# Patient Record
Sex: MALE | Race: BLACK OR AFRICAN AMERICAN | NOT HISPANIC OR LATINO | URBAN - METROPOLITAN AREA
[De-identification: names, ages, dates, MRNs, and addresses within clinical notes are randomized per-mention and may not be internally consistent; named-entity substitution may affect disease eponyms.]

---

## 2018-07-19 ENCOUNTER — EMERGENCY (EMERGENCY)
Facility: HOSPITAL | Age: 48
LOS: 0 days | Discharge: HOME | End: 2018-07-19
Attending: EMERGENCY MEDICINE
Payer: MEDICAID

## 2018-07-19 VITALS
WEIGHT: 189.6 LBS | HEART RATE: 72 BPM | RESPIRATION RATE: 17 BRPM | TEMPERATURE: 97 F | HEIGHT: 69 IN | OXYGEN SATURATION: 100 % | SYSTOLIC BLOOD PRESSURE: 100 MMHG | DIASTOLIC BLOOD PRESSURE: 63 MMHG

## 2018-07-19 DIAGNOSIS — K59.00 CONSTIPATION, UNSPECIFIED: ICD-10-CM

## 2018-07-19 DIAGNOSIS — K56.41 FECAL IMPACTION: ICD-10-CM

## 2018-07-19 DIAGNOSIS — N18.6 END STAGE RENAL DISEASE: ICD-10-CM

## 2018-07-19 DIAGNOSIS — K62.89 OTHER SPECIFIED DISEASES OF ANUS AND RECTUM: ICD-10-CM

## 2018-07-19 DIAGNOSIS — Z99.2 DEPENDENCE ON RENAL DIALYSIS: ICD-10-CM

## 2018-07-19 PROCEDURE — 99284 EMERGENCY DEPT VISIT MOD MDM: CPT | Mod: 25

## 2018-07-19 RX ORDER — SENNOSIDES/DOCUSATE SODIUM 8.6MG-50MG
2 TABLET ORAL
Qty: 20 | Refills: 0 | OUTPATIENT
Start: 2018-07-19 | End: 2018-07-28

## 2018-07-19 RX ORDER — SENNOSIDES/DOCUSATE SODIUM 8.6MG-50MG
2 TABLET ORAL
Qty: 20 | Refills: 0
Start: 2018-07-19 | End: 2018-07-28

## 2018-07-19 NOTE — ED ADULT NURSE NOTE - OBJECTIVE STATEMENT
Pt presents to the ED awake alert and oriented x 3. C/O rectal pain , pt states he feels like he is constipated. his last BM was yesterday. will continue to monitor

## 2018-07-19 NOTE — ED PROVIDER NOTE - MEDICAL DECISION MAKING DETAILS
Patient with fecal impaction, relieved in the ER.  VSS, felt much improved.  Further BM achieved with enema.  Patient will be discharged with laxative meds.  Based on patient's history and physical exam, there are no apparent indications for further emergent labs, imaging, or additional diagnostics at this time.  Discussed results and outcome of today's visit with the patient.  Patient advised to please follow up with another healthcare provider within the next 24 hours and return to the Emergency Department for worsening symptoms or any other concerns.  Patient advised that their doctor may call  to follow up on the specific results of the tests performed today in the emergency department.   Patient appears well on discharge.

## 2018-07-19 NOTE — ED PROVIDER NOTE - PROGRESS NOTE DETAILS
Digital disimpaction done in ER with immediate relief, still has hard stool far up rectum which is hard to reach, ordered enema, no masses in rectum.

## 2018-07-19 NOTE — ED PROVIDER NOTE - OBJECTIVE STATEMENT
Pertinent PMH/PSH/FHx/SHx and Review of Systems contained within:  Patient presents to the ED for rectal pain.  Patient says that he was having constipation, straining to have a BM but was unable to go.  Now having intense rectal pain.  Denies any abdominal pain or vomiting.  Denies any blood in stool.  LBM was yesterday, was a little hard.  Denies inserting any objects per rectum.     Relevant PMHx/SHx/SOCHx/FAMH:  ESRD on TuThSa  Patient denies EtOH/tobacco/illicit substance use.    ROS: No fever/chills, No headache/photophobia/eye pain/changes in vision, No ear pain/sore throat/dysphagia, No chest pain/palpitations, no SOB/cough/wheeze/stridor, No abdominal pain, No N/V/D/melena, no dysuria/frequency/discharge, No neck/back pain, no rash, no changes in neurological status/function.

## 2018-07-19 NOTE — ED ADULT TRIAGE NOTE - CHIEF COMPLAINT QUOTE
rectal pain feels constipated , last BM yesterday rectal pain feels constipated , last BM yesterday, dialysis  pt

## 2018-07-19 NOTE — ED PROVIDER NOTE - PHYSICAL EXAMINATION
Gen: Alert, moderate distress, well appearing  Head: NC, AT, PERRL, EOMI, normal lids/conjunctiva  ENT: normal hearing, patent oropharynx without erythema/exudate, uvula midline  Neck: +supple, no tenderness/meningismus/JVD, +Trachea midline  Pulm: Bilateral BS, normal resp effort, no wheeze/stridor/retractions  CV: RRR, no M/R/G, +dist pulses  Abd: soft, NT/ND, +BS, no palpable masses  Rectal: No hemorrhoids, rectum is distended due to fecal impaction  Mskel: no edema/erythema/cyanosis  Skin: no rash, warm/dry  Neuro: AAOx3, no apparent sensory/motor deficits, coordination intact

## 2019-03-07 ENCOUNTER — EMERGENCY (EMERGENCY)
Facility: HOSPITAL | Age: 49
LOS: 0 days | Discharge: HOME | End: 2019-03-07
Attending: EMERGENCY MEDICINE
Payer: MEDICAID

## 2019-03-07 VITALS
HEART RATE: 90 BPM | SYSTOLIC BLOOD PRESSURE: 146 MMHG | RESPIRATION RATE: 18 BRPM | OXYGEN SATURATION: 95 % | TEMPERATURE: 98 F | DIASTOLIC BLOOD PRESSURE: 814 MMHG

## 2019-03-07 VITALS
RESPIRATION RATE: 20 BRPM | HEIGHT: 70 IN | SYSTOLIC BLOOD PRESSURE: 164 MMHG | OXYGEN SATURATION: 99 % | WEIGHT: 205.03 LBS | HEART RATE: 90 BPM | TEMPERATURE: 98 F | DIASTOLIC BLOOD PRESSURE: 80 MMHG

## 2019-03-07 DIAGNOSIS — Z99.2 DEPENDENCE ON RENAL DIALYSIS: ICD-10-CM

## 2019-03-07 DIAGNOSIS — M79.89 OTHER SPECIFIED SOFT TISSUE DISORDERS: ICD-10-CM

## 2019-03-07 DIAGNOSIS — N18.6 END STAGE RENAL DISEASE: ICD-10-CM

## 2019-03-07 LAB
ALBUMIN SERPL ELPH-MCNC: 2.9 G/DL — LOW (ref 3.3–5)
ALP SERPL-CCNC: 68 U/L — SIGNIFICANT CHANGE UP (ref 40–120)
ALT FLD-CCNC: 23 U/L — SIGNIFICANT CHANGE UP (ref 12–78)
ANION GAP SERPL CALC-SCNC: 12 MMOL/L — SIGNIFICANT CHANGE UP (ref 5–17)
APTT BLD: 31.7 SEC — SIGNIFICANT CHANGE UP (ref 27.5–36.3)
AST SERPL-CCNC: 23 U/L — SIGNIFICANT CHANGE UP (ref 15–37)
BILIRUB SERPL-MCNC: 0.5 MG/DL — SIGNIFICANT CHANGE UP (ref 0.2–1.2)
BUN SERPL-MCNC: 78 MG/DL — HIGH (ref 7–23)
CALCIUM SERPL-MCNC: 6.5 MG/DL — CRITICAL LOW (ref 8.5–10.1)
CHLORIDE SERPL-SCNC: 107 MMOL/L — SIGNIFICANT CHANGE UP (ref 96–108)
CO2 SERPL-SCNC: 23 MMOL/L — SIGNIFICANT CHANGE UP (ref 22–31)
CREAT SERPL-MCNC: 19.8 MG/DL — HIGH (ref 0.5–1.3)
GLUCOSE SERPL-MCNC: 54 MG/DL — LOW (ref 70–99)
HCT VFR BLD CALC: 23.2 % — LOW (ref 39–50)
HGB BLD-MCNC: 7.7 G/DL — LOW (ref 13–17)
INR BLD: 1.14 RATIO — SIGNIFICANT CHANGE UP (ref 0.88–1.16)
MCHC RBC-ENTMCNC: 29.3 PG — SIGNIFICANT CHANGE UP (ref 27–34)
MCHC RBC-ENTMCNC: 33.2 GM/DL — SIGNIFICANT CHANGE UP (ref 32–36)
MCV RBC AUTO: 88.2 FL — SIGNIFICANT CHANGE UP (ref 80–100)
NRBC # BLD: 0 /100 WBCS — SIGNIFICANT CHANGE UP (ref 0–0)
PLATELET # BLD AUTO: 214 K/UL — SIGNIFICANT CHANGE UP (ref 150–400)
POTASSIUM SERPL-MCNC: 4.8 MMOL/L — SIGNIFICANT CHANGE UP (ref 3.5–5.3)
POTASSIUM SERPL-SCNC: 4.8 MMOL/L — SIGNIFICANT CHANGE UP (ref 3.5–5.3)
PROT SERPL-MCNC: 8 GM/DL — SIGNIFICANT CHANGE UP (ref 6–8.3)
PROTHROM AB SERPL-ACNC: 12.8 SEC — SIGNIFICANT CHANGE UP (ref 10–12.9)
RBC # BLD: 2.63 M/UL — LOW (ref 4.2–5.8)
RBC # FLD: 14.2 % — SIGNIFICANT CHANGE UP (ref 10.3–14.5)
SODIUM SERPL-SCNC: 142 MMOL/L — SIGNIFICANT CHANGE UP (ref 135–145)
WBC # BLD: 7.43 K/UL — SIGNIFICANT CHANGE UP (ref 3.8–10.5)
WBC # FLD AUTO: 7.43 K/UL — SIGNIFICANT CHANGE UP (ref 3.8–10.5)

## 2019-03-07 PROCEDURE — 93970 EXTREMITY STUDY: CPT | Mod: 26

## 2019-03-07 PROCEDURE — 71045 X-RAY EXAM CHEST 1 VIEW: CPT | Mod: 26

## 2019-03-07 PROCEDURE — 99284 EMERGENCY DEPT VISIT MOD MDM: CPT

## 2019-03-07 RX ORDER — CALCIUM GLUCONATE 100 MG/ML
1 VIAL (ML) INTRAVENOUS ONCE
Qty: 0 | Refills: 0 | Status: COMPLETED | OUTPATIENT
Start: 2019-03-07 | End: 2019-03-07

## 2019-03-07 RX ORDER — ACETAMINOPHEN 500 MG
975 TABLET ORAL ONCE
Qty: 0 | Refills: 0 | Status: COMPLETED | OUTPATIENT
Start: 2019-03-07 | End: 2019-03-07

## 2019-03-07 RX ADMIN — Medication 1 GRAM(S): at 18:27

## 2019-03-07 RX ADMIN — Medication 200 GRAM(S): at 17:36

## 2019-03-07 RX ADMIN — Medication 975 MILLIGRAM(S): at 18:57

## 2019-03-07 RX ADMIN — Medication 975 MILLIGRAM(S): at 15:15

## 2019-03-07 NOTE — ED PROVIDER NOTE - PHYSICAL EXAMINATION
Vitals: HTN at 164/80, otherwise WNL  Gen: AAOx3, NAD, sitting comfortably in stretcher  Head: ncat, perrla, eomi b/l  Neck: supple, no lymphadenopathy, no midline deviation  Heart: rrr, no m/r/g  Lungs: CTA b/l, no rales/ronchi/wheezes  Abd: soft, nontender, non-distended, no rebound or guarding  Ext: no clubbing/cyanosis, b/l LE 2+ edema, no signs of cellulitis or infection, tender to palpation over b/l calves  Neuro: sensation and muscle strength intact b/l, steady gait

## 2019-03-07 NOTE — ED PROVIDER NOTE - CLINICAL SUMMARY MEDICAL DECISION MAKING FREE TEXT BOX
49 yo M with LE edema, L foot pain atraumatic  -r/o DVT, US b/l LE, basic labs, mag, coags, cxray chest (r/o edema), tylenol prn, monitor, iv line  -f/u results, reeval

## 2019-03-07 NOTE — ED ADULT NURSE NOTE - NSIMPLEMENTINTERV_GEN_ALL_ED
Implemented All Fall Risk Interventions:  [place] to call system. Call bell, personal items and telephone within reach. Instruct patient to call for assistance. Room bathroom lighting operational. Non-slip footwear when patient is off stretcher. Physically safe environment: no spills, clutter or unnecessary equipment. Stretcher in lowest position, wheels locked, appropriate side rails in place. Provide visual cue, wrist band, yellow gown, etc. Monitor gait and stability. Monitor for mental status changes and reorient to person, place, and time. Review medications for side effects contributing to fall risk. Reinforce activity limits and safety measures with patient and family.

## 2019-03-07 NOTE — ED PROVIDER NOTE - CARE PROVIDER_API CALL
Fabrizio Bender)  Internal Medicine; Nephrology  [address] [facility], [address] [place] [id number]  Phone: [phone]  Fax: [phone]  Follow Up Time: 4-6 Days

## 2019-03-07 NOTE — ED ADULT NURSE NOTE - OBJECTIVE STATEMENT
received er bed 24 biba c/o l lower extremity noted with swelling x 2 days serous fluid leaking at site pain upon weight bearing pt with esrd on dialysis last treatment on tues was due today but states difficulty ambulating due to foot pain so called ambulance to come to er instead lungs clear b/l denies shortness of breath or chest pain l arm av fistula + bruit/thrill

## 2019-03-07 NOTE — ED PROVIDER NOTE - OBJECTIVE STATEMENT
47 yo M with b/l LE swelling, worse over the last few days, worse on L.  Pt. reports acute severe L foot pain also that stopped him from going to dialysis today.  Pt. denies acute inciting event/trauma.  He feels well otherwise.   ROS: negative for fever, cough, headache, chest pain, shortness of breath, abd pain, nausea, vomiting, diarrhea, rash, paresthesia, and weakness--all other systems reviewed are negative.   PMH: ESRD, dialysis dependent (T, [facility], [facility] in [place]); Meds: See EMR; SH: Denies smoking/drinking/drug use

## 2019-03-07 NOTE — ED ADULT TRIAGE NOTE - CHIEF COMPLAINT QUOTE
biba . " pt c/o left leg swelling and difficulty walking , "pt missed dialysis today was last dialyzed on Tuesday

## 2019-03-08 ENCOUNTER — EMERGENCY (EMERGENCY)
Facility: HOSPITAL | Age: 49
LOS: 1 days | Discharge: HOME | End: 2019-03-08
Attending: EMERGENCY MEDICINE | Admitting: EMERGENCY MEDICINE
Payer: COMMERCIAL

## 2019-03-08 VITALS
WEIGHT: 201.94 LBS | TEMPERATURE: 99 F | RESPIRATION RATE: 18 BRPM | DIASTOLIC BLOOD PRESSURE: 89 MMHG | OXYGEN SATURATION: 97 % | HEART RATE: 95 BPM | SYSTOLIC BLOOD PRESSURE: 161 MMHG

## 2019-03-08 DIAGNOSIS — I87.2 VENOUS INSUFFICIENCY (CHRONIC) (PERIPHERAL): ICD-10-CM

## 2019-03-08 DIAGNOSIS — I83.023 VARICOSE VEINS OF LEFT LOWER EXTREMITY WITH ULCER OF ANKLE: ICD-10-CM

## 2019-03-08 DIAGNOSIS — M79.89 OTHER SPECIFIED SOFT TISSUE DISORDERS: ICD-10-CM

## 2019-03-08 DIAGNOSIS — Z79.899 OTHER LONG TERM (CURRENT) DRUG THERAPY: ICD-10-CM

## 2019-03-08 LAB
ALBUMIN SERPL ELPH-MCNC: 3.6 G/DL — SIGNIFICANT CHANGE UP (ref 3.3–5)
ALP SERPL-CCNC: 62 U/L — SIGNIFICANT CHANGE UP (ref 40–120)
ALT FLD-CCNC: 15 U/L — SIGNIFICANT CHANGE UP (ref 10–45)
ANION GAP SERPL CALC-SCNC: 18 MMOL/L — HIGH (ref 5–17)
APPEARANCE UR: CLEAR — SIGNIFICANT CHANGE UP
APTT BLD: 30.5 SEC — SIGNIFICANT CHANGE UP (ref 27.5–36.3)
AST SERPL-CCNC: 17 U/L — SIGNIFICANT CHANGE UP (ref 10–40)
BACTERIA # UR AUTO: PRESENT /HPF
BASOPHILS # BLD AUTO: 0.02 K/UL — SIGNIFICANT CHANGE UP (ref 0–0.2)
BASOPHILS NFR BLD AUTO: 0.3 % — SIGNIFICANT CHANGE UP (ref 0–2)
BILIRUB SERPL-MCNC: 0.3 MG/DL — SIGNIFICANT CHANGE UP (ref 0.2–1.2)
BILIRUB UR-MCNC: NEGATIVE — SIGNIFICANT CHANGE UP
BUN SERPL-MCNC: 75 MG/DL — HIGH (ref 7–23)
CALCIUM SERPL-MCNC: 7.4 MG/DL — LOW (ref 8.4–10.5)
CHLORIDE SERPL-SCNC: 102 MMOL/L — SIGNIFICANT CHANGE UP (ref 96–108)
CO2 SERPL-SCNC: 20 MMOL/L — LOW (ref 22–31)
COLOR SPEC: YELLOW — SIGNIFICANT CHANGE UP
CREAT SERPL-MCNC: 20.09 MG/DL — HIGH (ref 0.5–1.3)
DIFF PNL FLD: ABNORMAL
EOSINOPHIL # BLD AUTO: 1.5 K/UL — HIGH (ref 0–0.5)
EOSINOPHIL NFR BLD AUTO: 20.1 % — HIGH (ref 0–6)
EPI CELLS # UR: SIGNIFICANT CHANGE UP /HPF (ref 0–5)
GLUCOSE SERPL-MCNC: 99 MG/DL — SIGNIFICANT CHANGE UP (ref 70–99)
GLUCOSE UR QL: NEGATIVE — SIGNIFICANT CHANGE UP
HCT VFR BLD CALC: 24 % — LOW (ref 39–50)
HGB BLD-MCNC: 8 G/DL — LOW (ref 13–17)
IMM GRANULOCYTES NFR BLD AUTO: 0.7 % — SIGNIFICANT CHANGE UP (ref 0–1.5)
INR BLD: 1.15 — SIGNIFICANT CHANGE UP (ref 0.88–1.16)
KETONES UR-MCNC: NEGATIVE — SIGNIFICANT CHANGE UP
LEUKOCYTE ESTERASE UR-ACNC: NEGATIVE — SIGNIFICANT CHANGE UP
LYMPHOCYTES # BLD AUTO: 0.53 K/UL — LOW (ref 1–3.3)
LYMPHOCYTES # BLD AUTO: 7.1 % — LOW (ref 13–44)
MCHC RBC-ENTMCNC: 30.4 PG — SIGNIFICANT CHANGE UP (ref 27–34)
MCHC RBC-ENTMCNC: 33.3 GM/DL — SIGNIFICANT CHANGE UP (ref 32–36)
MCV RBC AUTO: 91.3 FL — SIGNIFICANT CHANGE UP (ref 80–100)
MONOCYTES # BLD AUTO: 0.61 K/UL — SIGNIFICANT CHANGE UP (ref 0–0.9)
MONOCYTES NFR BLD AUTO: 8.2 % — SIGNIFICANT CHANGE UP (ref 2–14)
NEUTROPHILS # BLD AUTO: 4.76 K/UL — SIGNIFICANT CHANGE UP (ref 1.8–7.4)
NEUTROPHILS NFR BLD AUTO: 63.6 % — SIGNIFICANT CHANGE UP (ref 43–77)
NITRITE UR-MCNC: NEGATIVE — SIGNIFICANT CHANGE UP
NRBC # BLD: 0 /100 WBCS — SIGNIFICANT CHANGE UP (ref 0–0)
PH UR: 6 — SIGNIFICANT CHANGE UP (ref 5–8)
PLATELET # BLD AUTO: 239 K/UL — SIGNIFICANT CHANGE UP (ref 150–400)
POTASSIUM SERPL-MCNC: 4.5 MMOL/L — SIGNIFICANT CHANGE UP (ref 3.5–5.3)
POTASSIUM SERPL-SCNC: 4.5 MMOL/L — SIGNIFICANT CHANGE UP (ref 3.5–5.3)
PROT SERPL-MCNC: 8.5 G/DL — HIGH (ref 6–8.3)
PROT UR-MCNC: 100 MG/DL
PROTHROM AB SERPL-ACNC: 13.1 SEC — HIGH (ref 10–12.9)
RBC # BLD: 2.63 M/UL — LOW (ref 4.2–5.8)
RBC # FLD: 13.7 % — SIGNIFICANT CHANGE UP (ref 10.3–14.5)
RBC CASTS # UR COMP ASSIST: > 10 /HPF
SODIUM SERPL-SCNC: 140 MMOL/L — SIGNIFICANT CHANGE UP (ref 135–145)
SP GR SPEC: 1.01 — SIGNIFICANT CHANGE UP (ref 1–1.03)
UROBILINOGEN FLD QL: 0.2 E.U./DL — SIGNIFICANT CHANGE UP
WBC # BLD: 7.47 K/UL — SIGNIFICANT CHANGE UP (ref 3.8–10.5)
WBC # FLD AUTO: 7.47 K/UL — SIGNIFICANT CHANGE UP (ref 3.8–10.5)
WBC UR QL: < 5 /HPF — SIGNIFICANT CHANGE UP

## 2019-03-08 PROCEDURE — 36415 COLL VENOUS BLD VENIPUNCTURE: CPT

## 2019-03-08 PROCEDURE — 99284 EMERGENCY DEPT VISIT MOD MDM: CPT

## 2019-03-08 PROCEDURE — 87086 URINE CULTURE/COLONY COUNT: CPT

## 2019-03-08 PROCEDURE — 73630 X-RAY EXAM OF FOOT: CPT | Mod: 26,LT

## 2019-03-08 PROCEDURE — 85610 PROTHROMBIN TIME: CPT

## 2019-03-08 PROCEDURE — 73630 X-RAY EXAM OF FOOT: CPT

## 2019-03-08 PROCEDURE — 81001 URINALYSIS AUTO W/SCOPE: CPT

## 2019-03-08 PROCEDURE — 85730 THROMBOPLASTIN TIME PARTIAL: CPT

## 2019-03-08 PROCEDURE — 80053 COMPREHEN METABOLIC PANEL: CPT

## 2019-03-08 PROCEDURE — 85025 COMPLETE CBC W/AUTO DIFF WBC: CPT

## 2019-03-08 PROCEDURE — 99283 EMERGENCY DEPT VISIT LOW MDM: CPT

## 2019-03-08 RX ORDER — CEPHALEXIN 500 MG
500 CAPSULE ORAL ONCE
Qty: 0 | Refills: 0 | Status: COMPLETED | OUTPATIENT
Start: 2019-03-08 | End: 2019-03-08

## 2019-03-08 RX ORDER — ACETAMINOPHEN 500 MG
650 TABLET ORAL ONCE
Qty: 0 | Refills: 0 | Status: COMPLETED | OUTPATIENT
Start: 2019-03-08 | End: 2019-03-08

## 2019-03-08 RX ADMIN — Medication 500 MILLIGRAM(S): at 23:39

## 2019-03-08 RX ADMIN — Medication 650 MILLIGRAM(S): at 21:59

## 2019-03-08 NOTE — ED ADULT NURSE NOTE - NSIMPLEMENTINTERV_GEN_ALL_ED
Implemented All Universal Safety Interventions:  [place] to call system. Call bell, personal items and telephone within reach. Instruct patient to call for assistance. Room bathroom lighting operational. Non-slip footwear when patient is off stretcher. Physically safe environment: no spills, clutter or unnecessary equipment. Stretcher in lowest position, wheels locked, appropriate side rails in place.

## 2019-03-08 NOTE — ED ADULT TRIAGE NOTE - CHIEF COMPLAINT QUOTE
Patient complaining of left foot swelling, with weeping edema, for three days.  Patient states he was recently discharge from [facility] for the same complaint.  Patient denies any CP, SOB, dizziness, N/V/D, fevers, chills or any other complaints at this time.

## 2019-03-08 NOTE — ED ADULT NURSE NOTE - CHIEF COMPLAINT QUOTE
Patient complaining of left foot swelling, with weeping edema, for three days.  Patient states he was recently discharge from [facility] for the same complaint.  Patient denies any CP, SOB, dizziness, N/V/D, fevers, chills or any other complaints at this time.

## 2019-03-08 NOTE — ED ADULT NURSE NOTE - OBJECTIVE STATEMENT
47 y/o male c/o left lower extremity swelling. AOx3, went to [facility] ED yesterday for same symtoms, discharged. Hx kidney failure, dialysis every tues thurs and sat. Last dialyzed tues, did not go yesterday because went to hospital for eval. +pain when walking, itchy rashes throughout body. Dialysis cath on arrival left arm. Plan of care explained, peripheral IV placed, labs sent.

## 2019-03-09 VITALS
OXYGEN SATURATION: 99 % | HEART RATE: 91 BPM | SYSTOLIC BLOOD PRESSURE: 160 MMHG | RESPIRATION RATE: 16 BRPM | DIASTOLIC BLOOD PRESSURE: 65 MMHG

## 2019-03-09 PROBLEM — N18.6 END STAGE RENAL DISEASE: Chronic | Status: ACTIVE | Noted: 2019-03-07

## 2019-03-09 RX ORDER — CEPHALEXIN 500 MG
1 CAPSULE ORAL
Qty: 28 | Refills: 0
Start: 2019-03-09 | End: 2019-03-15

## 2019-03-09 NOTE — ED PROVIDER NOTE - MUSCULOSKELETAL, MLM
2+ edema both legs.  superficial venous stasis ulcer on left medial maleolus with scant serosanguinous fluid on bandage.  bottom of left foot with apparent puncture wound with scab. mild tender to touch

## 2019-03-09 NOTE — ED PROVIDER NOTE - NSFOLLOWUPINSTRUCTIONS_ED_ALL_ED_FT
Please see your primary care provider in 24-48 hours.  Also see referral to podiatrist (foot doctor) for the wound on your foot.  Take antibiotic as prescribed and try to elevate your legs as much as possible when laying down to help decrease swelling.  Return to the ER if symptoms worsen or other concerns.    Leg Edema    WHAT YOU NEED TO KNOW:    Leg edema is swelling caused by fluid buildup. Your legs may swell if you sit or stand for long periods of time, are pregnant, or are injured. Swelling may also occur if you have heart failure or circulation problems. This means that your heart does not pump blood through your body as it should.    DISCHARGE INSTRUCTIONS:    Self-care:     Elevate your legs: Raise your legs above the level of your heart as often as you can. This will help decrease swelling and pain. Prop your legs on pillows or blankets to keep them elevated comfortably.      Wear pressure stockings: These tight stockings put pressure on your legs to promote blood flow and prevent blood clots. Wear the stockings during the day. Do not wear them while you sleep.      Apply heat: Heat helps decrease pain and swelling. Apply heat on the area for 20 to 30 minutes every 2 hours for as many days as directed.       Stay active: Do not stand or sit for long periods of time. Ask your healthcare provider about the best exercise plan for you.      Eat healthy foods: Healthy foods include fruits, vegetables, whole-grain breads, low-fat dairy products, beans, lean meats, and fish. Ask if you need to be on a special diet. Limit salt. Salt will make your body hold even more fluid.    Follow up with your healthcare provider as directed: Write down your questions so you remember to ask them during your visits.     Contact your healthcare provider if:     You have a fever or feel more tired than usual.      The veins in your legs look larger than usual. They may look full or bulging.      Your legs itch or feel heavy.      You have red or white areas or sores on your legs. The skin may also appear dimpled or have indentations.      You are gaining weight.      You have trouble moving your ankles.      The swelling does not go away, or other parts of your body swell.      You have questions or concerns about your condition or care.    Return to the emergency department if:     You cannot walk.      You feel faint or confused.       Your skin turns blue or gray.      Your leg feels warm, tender, and painful. It may be swollen and red.      You have chest pain or trouble breathing that is worse when you lie down.      You suddenly feel lightheaded and have trouble breathing.      You have new and sudden chest pain. You may have more pain when you take deep breaths or cough. You may also cough up blood.

## 2019-03-09 NOTE — ED PROVIDER NOTE - NSFOLLOWUPCLINICS_GEN_ALL_ED_FT
[facility] - Podiatry Clinic  Podiatry  [address] [place] [address]  Phone: [phone]  Fax:   Follow Up Time:

## 2019-03-09 NOTE — ED PROVIDER NOTE - OBJECTIVE STATEMENT
here with swelling of both legs gradually worsening over past several days/ weeks.  Notes a wound on medial aspect of left ankle that has been draining some fluid.  Denies fever/chills.  Has some pain with walking.  Seen at another ER two days ago and had duplex done neg for dvt and labs that were normal.

## 2019-03-09 NOTE — ED PROVIDER NOTE - CLINICAL SUMMARY MEDICAL DECISION MAKING FREE TEXT BOX
recent visit to osh reviewed with neg duplex and cxr.  labs repeated here today and unchanged with chronic renal insufficiency.  xray of foot done to r/o foreign body/ bony involvement and no acute pathology identified.  suspect venous stasis with early ulcer on leg.  will treat with keflex, leg elevation, refer to podiatry for plantar foot wound

## 2019-03-10 LAB
CULTURE RESULTS: SIGNIFICANT CHANGE UP
SPECIMEN SOURCE: SIGNIFICANT CHANGE UP

## 2019-03-20 ENCOUNTER — EMERGENCY (EMERGENCY)
Facility: HOSPITAL | Age: 49
LOS: 1 days | Discharge: HOME | End: 2019-03-20
Admitting: EMERGENCY MEDICINE
Payer: COMMERCIAL

## 2019-03-20 DIAGNOSIS — I83.029 VARICOSE VEINS OF LEFT LOWER EXTREMITY WITH ULCER OF UNSPECIFIED SITE: ICD-10-CM

## 2019-03-20 DIAGNOSIS — Z79.899 OTHER LONG TERM (CURRENT) DRUG THERAPY: ICD-10-CM

## 2019-03-20 DIAGNOSIS — N18.6 END STAGE RENAL DISEASE: ICD-10-CM

## 2019-03-20 DIAGNOSIS — Z79.2 LONG TERM (CURRENT) USE OF ANTIBIOTICS: ICD-10-CM

## 2019-03-20 PROCEDURE — 99282 EMERGENCY DEPT VISIT SF MDM: CPT | Mod: 25

## 2019-03-20 PROCEDURE — 99282 EMERGENCY DEPT VISIT SF MDM: CPT

## 2019-03-21 VITALS
DIASTOLIC BLOOD PRESSURE: 88 MMHG | TEMPERATURE: 98 F | HEART RATE: 88 BPM | SYSTOLIC BLOOD PRESSURE: 184 MMHG | HEIGHT: 69 IN | OXYGEN SATURATION: 98 % | WEIGHT: 201.94 LBS | RESPIRATION RATE: 18 BRPM

## 2019-03-21 NOTE — ED PROVIDER NOTE - CARE PLAN
Principal Discharge DX:	Venous stasis ulcer  Secondary Diagnosis:	ESRD (end stage renal disease) on dialysis  Secondary Diagnosis:	Peripheral edema

## 2019-03-21 NOTE — ED PROVIDER NOTE - CLINICAL SUMMARY MEDICAL DECISION MAKING FREE TEXT BOX
counseled on vascular care and follow up along with elevation and discussion care and planning with nephrologist / PMD ( no overt infective pathology noted to chronic appearing ulcerations

## 2019-03-21 NOTE — ED ADULT NURSE NOTE - OBJECTIVE STATEMENT
47 y/o male with hx of esrd arrived to [facility] ER requesting wound check. pt verbalized that he has a draining ulcer to posterior part of left leg. Upon assessment, ulcer noted to left posterior leg, abdomen soft, lung fields WNL, breathing is equal and unlabored, pulses palpable. Pt denies fever, chills, LOC, SOB, weakness, fatigue, recent travel, recent injury, and palpitations, chest pain, headache, dizziness, blurred vision, slurred speech, nausea, vomiting, diarrhea. Pt verbalized that he was recently on keflex for similar compliant. Care in progress.

## 2019-03-21 NOTE — ED PROVIDER NOTE - OBJECTIVE STATEMENT
Patient returns to ED with concerns about vascular venous stasis ulcers with intermittent weeping -> history limited as to length of duration of these ulcerations though no overt changes espoused. Reports CKD on HD as well as seen several facilities and received neg dopplers to LE

## 2019-03-21 NOTE — ED PROVIDER NOTE - PHYSICAL EXAMINATION
non infected appearing chronic LLE venous statis ulcerations in setting of bilateral edema of chronic appearance with bilateral trophic skin changes

## 2019-03-22 ENCOUNTER — EMERGENCY (EMERGENCY)
Facility: HOSPITAL | Age: 49
LOS: 1 days | Discharge: HOME | End: 2019-03-22
Admitting: EMERGENCY MEDICINE
Payer: COMMERCIAL

## 2019-03-22 VITALS
OXYGEN SATURATION: 99 % | DIASTOLIC BLOOD PRESSURE: 82 MMHG | HEIGHT: 69 IN | HEART RATE: 76 BPM | RESPIRATION RATE: 18 BRPM | SYSTOLIC BLOOD PRESSURE: 147 MMHG | WEIGHT: 199.96 LBS | TEMPERATURE: 98 F

## 2019-03-22 DIAGNOSIS — Z79.899 OTHER LONG TERM (CURRENT) DRUG THERAPY: ICD-10-CM

## 2019-03-22 DIAGNOSIS — M79.672 PAIN IN LEFT FOOT: ICD-10-CM

## 2019-03-22 PROCEDURE — 99282 EMERGENCY DEPT VISIT SF MDM: CPT

## 2019-03-22 PROCEDURE — 99282 EMERGENCY DEPT VISIT SF MDM: CPT | Mod: 25

## 2019-03-22 RX ORDER — ACETAMINOPHEN 500 MG
650 TABLET ORAL ONCE
Qty: 0 | Refills: 0 | Status: COMPLETED | OUTPATIENT
Start: 2019-03-22 | End: 2019-03-22

## 2019-03-22 RX ADMIN — Medication 650 MILLIGRAM(S): at 22:49

## 2019-03-22 RX ADMIN — Medication 650 MILLIGRAM(S): at 22:50

## 2019-03-22 NOTE — ED ADULT NURSE NOTE - NSIMPLEMENTINTERV_GEN_ALL_ED
Implemented All Universal Safety Interventions:  [place] to call system. Call bell, personal items and telephone within reach. Instruct patient to call for assistance. Room bathroom lighting operational. Non-slip footwear when patient is off stretcher. Physically safe environment: no spills, clutter or unnecessary equipment. Stretcher in lowest position, wheels locked, appropriate side rails in place.

## 2019-03-22 NOTE — ED PROVIDER NOTE - OBJECTIVE STATEMENT
47 y/o m hx ESRD on HD presents for pain in left foot.  Pt with frequent ED visits for pain, ulcers to feet and ankles, was seen 1 day ago for similar complaint, reports hasn't followed up with podiatry yet.  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  Pt reports taking care of his ulcers which have been dry.  Denies fever, chills, all other ROS negative.

## 2019-03-22 NOTE — ED ADULT NURSE REASSESSMENT NOTE - NS ED NURSE REASSESS COMMENT FT1
Left lower leg /foot wound /ulcers re-dressed, no new symptom complaint.  Discharged to home in stable condition.

## 2019-03-22 NOTE — ED PROVIDER NOTE - NSFOLLOWUPINSTRUCTIONS_ED_ALL_ED_FT
Venous Ulcer  ImageA venous ulcer is a shallow sore on your lower leg. It is caused by poor circulation in your veins. Venous ulcer is the most common type of lower leg ulcer. You may have venous ulcers on one leg or on both legs. This condition most often develops around your ankles. This type of ulcer may last for a long time (chronic ulcer) or it may return often (recurrent ulcer).    Follow these instructions at home:  Wound care     Follow instructions from your doctor about:    How to take care of your wound.  When and how you should change your bandage (dressing).  When you should remove your bandage. If your bandage is dry and gets stuck to your leg when you try to remove it, moisten or wet the bandage with saline solution or water. This helps you to remove it without harming your skin or wound.    Check your wound every day for signs of infection. Have a caregiver do this for you if you are not able to do it yourself. Watch for:    More redness, swelling, or pain.  More fluid or blood.  Pus, warmth, or a bad smell.    Medicines     Take over-the-counter and prescription medicines only as told by your doctor.  If you were prescribed an antibiotic medicine, take it or apply it as told by your doctor. Do not stop taking or using the antibiotic even if your condition improves.  Activity     Do not stand or sit in one position for a long period of time. Rest with your legs raised during the day. If possible, keep your legs above your heart for 30 minutes, 3–4 times a day, or as told by your doctor.  Do not sit with your legs crossed.  Walk often to increase the blood flow in your legs. Ask your doctor what level of activity is safe for you.  If you are taking a long ride in a car or plane, take a break to walk around at least once every two hours, or as told by your doctor. Ask your doctor if you should take aspirin before long trips.  General instructions     Wear elastic stockings, compression stockings, or support hose as told by your doctor. This is very important.  Raise the foot of your bed as told by your doctor.  Do not smoke.  ImageKeep all follow-up visits as told by your doctor. This is important.  Contact a doctor if:  You have a fever.  Your ulcer is getting larger or is not healing.  Your pain gets worse.  You have more redness or swelling around your ulcer.  You have more fluid, blood, or pus coming from your ulcer after it has been cleaned by you or your doctor.  You have warmth or a bad smell coming from your ulcer.

## 2019-03-22 NOTE — ED PROVIDER NOTE - NSFOLLOWUPCLINICS_GEN_ALL_ED_FT
[facility] - Podiatry Clinic  Podiatry  [address] [place] [address]  Phone: [phone]  Fax:   Follow Up Time:

## 2019-03-22 NOTE — ED PROVIDER NOTE - CLINICAL SUMMARY MEDICAL DECISION MAKING FREE TEXT BOX
47 y/o m hx ESRD on HD presents c/o pain to left foot; pt with chronic healing ulcers, chronic edema, exam unremarkable, not concerned for acute process, no concern for infection, pt ambulatory.  Dressings changed to foot, tylenol given, will refer to podiatry.

## 2019-03-22 NOTE — ED PROVIDER NOTE - MUSCULOSKELETAL, MLM
left foot; chronic healing ulcers to medial malleolus, mild swelling to foot, no erythema, minimal tenderness

## 2019-03-22 NOTE — ED ADULT NURSE NOTE - OBJECTIVE STATEMENT
Patient states has 3 ulcers in left lower leg and foot, states symptoms X 2 weeks, was seen for same yesterday and ACE bandage applied and discharged with medications.  Complains of continued pain in area .

## 2019-03-24 ENCOUNTER — EMERGENCY (EMERGENCY)
Facility: HOSPITAL | Age: 49
LOS: 1 days | Discharge: HOME | End: 2019-03-24
Admitting: EMERGENCY MEDICINE
Payer: COMMERCIAL

## 2019-03-24 VITALS
HEIGHT: 70 IN | RESPIRATION RATE: 16 BRPM | TEMPERATURE: 98 F | DIASTOLIC BLOOD PRESSURE: 79 MMHG | SYSTOLIC BLOOD PRESSURE: 150 MMHG | HEART RATE: 85 BPM | WEIGHT: 171.96 LBS | OXYGEN SATURATION: 97 %

## 2019-03-24 DIAGNOSIS — Z48.00 ENCOUNTER FOR CHANGE OR REMOVAL OF NONSURGICAL WOUND DRESSING: ICD-10-CM

## 2019-03-24 DIAGNOSIS — M79.672 PAIN IN LEFT FOOT: ICD-10-CM

## 2019-03-24 DIAGNOSIS — Z79.899 OTHER LONG TERM (CURRENT) DRUG THERAPY: ICD-10-CM

## 2019-03-24 DIAGNOSIS — I83.023 VARICOSE VEINS OF LEFT LOWER EXTREMITY WITH ULCER OF ANKLE: ICD-10-CM

## 2019-03-24 DIAGNOSIS — I87.8 OTHER SPECIFIED DISORDERS OF VEINS: ICD-10-CM

## 2019-03-24 DIAGNOSIS — Z79.2 LONG TERM (CURRENT) USE OF ANTIBIOTICS: ICD-10-CM

## 2019-03-24 DIAGNOSIS — Z99.2 DEPENDENCE ON RENAL DIALYSIS: ICD-10-CM

## 2019-03-24 DIAGNOSIS — L97.329 NON-PRESSURE CHRONIC ULCER OF LEFT ANKLE WITH UNSPECIFIED SEVERITY: ICD-10-CM

## 2019-03-24 PROCEDURE — 99282 EMERGENCY DEPT VISIT SF MDM: CPT | Mod: 25

## 2019-03-25 PROCEDURE — 99282 EMERGENCY DEPT VISIT SF MDM: CPT

## 2019-03-25 RX ORDER — ACETAMINOPHEN 500 MG
650 TABLET ORAL ONCE
Qty: 0 | Refills: 0 | Status: COMPLETED | OUTPATIENT
Start: 2019-03-25 | End: 2019-03-25

## 2019-03-25 RX ADMIN — Medication 650 MILLIGRAM(S): at 03:44

## 2019-03-25 NOTE — ED PROVIDER NOTE - CARE PROVIDER_API CALL
Duy Almendarze)  Vascular Surgery  [address], [address], [place] [address]  Phone: [phone]  Fax: [phone]  Follow Up Time:     Matilde Alvarado)  Surgery; Vascular Surgery  [address], [address] [place] [id number]  Phone: [phone]  Fax: [phone]  Follow Up Time:

## 2019-03-25 NOTE — ED PROVIDER NOTE - CLINICAL SUMMARY MEDICAL DECISION MAKING FREE TEXT BOX
undomicile m presents requesting dressing change for ulcers - well healing w/o signs of inf, cleaned and dressings changed, vascular f/u provided, pt given food as requested, agrees w/dc plan

## 2019-03-25 NOTE — ED PROVIDER NOTE - CARE PROVIDERS DIRECT ADDRESSES
[email or web address]

## 2019-03-25 NOTE — ED ADULT NURSE NOTE - CHPI ED NUR SYMPTOMS NEG
no stiffness/no fever/no tingling/no bruising/no difficulty bearing weight/no abrasion/no deformity/no back pain/no weakness/no numbness

## 2019-03-25 NOTE — ED PROVIDER NOTE - OBJECTIVE STATEMENT
The pt is a 49 y/o undomicile M, who presents to ED requesting dressing change to L leg - hx of ulcers, has not f/u w/vascular clinic. Pt also wanting tyl for leg soreness. Denies fevers, chills, injury, falls, pus, bleeding

## 2019-03-25 NOTE — ED PROVIDER NOTE - MUSCULOSKELETAL, MLM
L LE: + old/dirty dressing in place to ankle and mid shin -- removed, found to have a healing ulcer below lateral malleolus, and a small abrasion to posterior calf, no erythema, no pus, no bleeding, no warmth, no tend, pedal pulse 2+, no calf tend, + venous stasis changes b/l

## 2019-03-25 NOTE — ED ADULT NURSE NOTE - OBJECTIVE STATEMENT
Presents to ED for left foot pain which has been aching him all day. Denies any CP, SOB, fevers, chills, n/v/d, numbness, tingling.

## 2019-03-26 ENCOUNTER — APPOINTMENT (OUTPATIENT)
Dept: VASCULAR SURGERY | Facility: CLINIC | Age: 49
End: 2019-03-26
Payer: COMMERCIAL

## 2019-03-26 VITALS — DIASTOLIC BLOOD PRESSURE: 72 MMHG | OXYGEN SATURATION: 93 % | HEART RATE: 97 BPM | SYSTOLIC BLOOD PRESSURE: 148 MMHG

## 2019-03-26 DIAGNOSIS — L98.8 OTHER SPECIFIED DISORDERS OF THE SKIN AND SUBCUTANEOUS TISSUE: ICD-10-CM

## 2019-03-26 PROBLEM — Z00.00 ENCOUNTER FOR PREVENTIVE HEALTH EXAMINATION: Status: ACTIVE | Noted: 2019-03-26

## 2019-03-26 PROCEDURE — 29580 STRAPPING UNNA BOOT: CPT | Mod: LT

## 2019-03-26 PROCEDURE — 99203 OFFICE O/P NEW LOW 30 MIN: CPT | Mod: 25

## 2019-03-28 NOTE — PHYSICAL EXAM
[Respiratory Effort] : normal respiratory effort [Normal Heart Sounds] : normal heart sounds [2+] : left 2+ [Ankle Swelling (On Exam)] : present [Ankle Swelling Bilaterally] : severe [de-identified] : overweight, appears older than stated age [de-identified] : bilateral legs are very dry, he has left lateral foot ulcer, clean, superficial, about 3x3cm.

## 2019-03-28 NOTE — ASSESSMENT
[Arterial/Venous Disease] : arterial/venous disease [FreeTextEntry1] : 49 yo male with new left foot ulcer for the past week and bilateral LE edema. Recommend UNNA boot on the left leg and compression on the right leg with ACE bandage or compression stockings. Will see him back in one week for UNNA boot change.

## 2019-03-28 NOTE — HISTORY OF PRESENT ILLNESS
[FreeTextEntry1] : 48 year old male with hx of ESRD presenting to the office for bilateral lower extremity edema and left foot ulcer. He has noticed it for the past week, he went to the ED yesterday and they cleaned it and wrapped with ACE bandage and advised him to FU with vascular surgery. It is very painful, worse when he is walking all day, he works for [employer].

## 2019-04-02 ENCOUNTER — APPOINTMENT (OUTPATIENT)
Dept: VASCULAR SURGERY | Facility: CLINIC | Age: 49
End: 2019-04-02
Payer: COMMERCIAL

## 2019-04-02 VITALS — SYSTOLIC BLOOD PRESSURE: 158 MMHG | DIASTOLIC BLOOD PRESSURE: 85 MMHG | OXYGEN SATURATION: 96 % | HEART RATE: 92 BPM

## 2019-04-02 PROCEDURE — 99213 OFFICE O/P EST LOW 20 MIN: CPT | Mod: 25

## 2019-04-02 PROCEDURE — 29580 STRAPPING UNNA BOOT: CPT

## 2019-04-04 NOTE — PHYSICAL EXAM
[Respiratory Effort] : normal respiratory effort [Normal Heart Sounds] : normal heart sounds [2+] : left 2+ [Ankle Swelling (On Exam)] : present [Ankle Swelling Bilaterally] : severe [Calm] : calm [de-identified] : overweight, appears older than stated age [de-identified] : bilateral legs are very dry, he has left lateral foot ulcer, clean, superficial, about 1x1cm.

## 2019-04-04 NOTE — HISTORY OF PRESENT ILLNESS
[FreeTextEntry1] : 48 year old male with hx of ESRD presenting to the office for bilateral lower extremity edema and left foot ulcer. He has been wearing UNNA boot for the past week, tolerating well but very itchy. Ulcer still painful, but better after he stops walking on it.

## 2019-04-05 ENCOUNTER — EMERGENCY (EMERGENCY)
Facility: HOSPITAL | Age: 49
LOS: 1 days | Discharge: HOME | End: 2019-04-05
Attending: EMERGENCY MEDICINE | Admitting: EMERGENCY MEDICINE
Payer: COMMERCIAL

## 2019-04-05 VITALS
HEIGHT: 69 IN | RESPIRATION RATE: 18 BRPM | OXYGEN SATURATION: 100 % | TEMPERATURE: 98 F | SYSTOLIC BLOOD PRESSURE: 180 MMHG | HEART RATE: 86 BPM | DIASTOLIC BLOOD PRESSURE: 90 MMHG | WEIGHT: 198.42 LBS

## 2019-04-05 VITALS
HEART RATE: 84 BPM | TEMPERATURE: 98 F | RESPIRATION RATE: 18 BRPM | DIASTOLIC BLOOD PRESSURE: 80 MMHG | OXYGEN SATURATION: 98 % | SYSTOLIC BLOOD PRESSURE: 145 MMHG

## 2019-04-05 DIAGNOSIS — M79.672 PAIN IN LEFT FOOT: ICD-10-CM

## 2019-04-05 DIAGNOSIS — Z79.2 LONG TERM (CURRENT) USE OF ANTIBIOTICS: ICD-10-CM

## 2019-04-05 DIAGNOSIS — L97.329 NON-PRESSURE CHRONIC ULCER OF LEFT ANKLE WITH UNSPECIFIED SEVERITY: ICD-10-CM

## 2019-04-05 DIAGNOSIS — Z79.899 OTHER LONG TERM (CURRENT) DRUG THERAPY: ICD-10-CM

## 2019-04-05 PROCEDURE — 99282 EMERGENCY DEPT VISIT SF MDM: CPT | Mod: 25

## 2019-04-05 PROCEDURE — 99282 EMERGENCY DEPT VISIT SF MDM: CPT

## 2019-04-05 RX ORDER — ACETAMINOPHEN 500 MG
650 TABLET ORAL ONCE
Qty: 0 | Refills: 0 | Status: COMPLETED | OUTPATIENT
Start: 2019-04-05 | End: 2019-04-05

## 2019-04-05 RX ADMIN — Medication 650 MILLIGRAM(S): at 23:41

## 2019-04-05 NOTE — ED ADULT NURSE NOTE - OBJECTIVE STATEMENT
left foot pain for over 3 weeks been here Tuesday and seen vascular doctor with same problem left foot pain for over 3 weeks been here Tuesday and seen vascular doctor with same problem, a dialysis pt Tuesday, Thursday and Saturday.

## 2019-04-05 NOTE — ED PROVIDER NOTE - SKIN, MLM
chronic vascular changes noted to b/l LEs, dressing noted to left leg- mildly dirty, dressing taken down and  (+) chronic ulcer noted to left lateral malleolus with no discharge or cellulitis or signs of infection, dressings reapplied.

## 2019-04-05 NOTE — ED PROVIDER NOTE - OBJECTIVE STATEMENT
49 y/o M with PMH of ESRD on HD (last dialysis ?Thursday), ?HTN, ?compliance with medications presents for pain to left foot. Pt with frequent ED visits for pain, ulcers to feet and ankles. Pt reports taking care of his ulcers which have been dry and seeing vascular surgery last Tuesday (3 days ago) at which time he had his dressings changed. Denies fevers, chills, injury, falls, purulent dc or bleeding. Has not taken anything for pain. No other complaints. 47 y/o M with PMH of ESRD on HD (last dialysis ?Thursday), ?HTN, ?compliance with medications presents for pain to left foot. Pt with frequent ED visits for pain, ulcers to feet and ankles. Pt reports taking care of his ulcers which have been dry and seeing vascular surgery last Tuesday (3 days ago) at which time he states that had his dressings changed and is seeing vascular again in 4 days. Denies fevers, chills, injury, falls, purulent dc or bleeding. Has not taken anything for pain. No other complaints. 49 y/o M with PMH of ESRD on HD (last dialysis ?Thursday), ?HTN, ?compliance with medications presents for pain to left foot. Pt with frequent ED visits for pain, ulcers to feet and ankles. Pt reports taking care of his ulcers which have been dry and seeing vascular surgery last Tuesday (3 days ago) at which time he states that had his dressings changed and is seeing vascular again in 4 days. Denies fevers, chills, injury, falls, purulent dc or bleeding. Has not taken anything for pain. Noted to be hypertensive but pt denies CP, SOB, HA, focal neuro sxs. ?compliance with meds. No other complaints.

## 2019-04-05 NOTE — ED PROVIDER NOTE - NSFOLLOWUPINSTRUCTIONS_ED_ALL_ED_FT
Please follow up with vascular surgery as scheduled in 4 days.     Venous Ulcer    A venous ulcer is a shallow sore on your lower leg that is caused by poor circulation in your veins. This condition used to be called stasis ulcer.    Veins have valves that help return blood to the heart. If these valves do not work properly, it can cause blood to flow backward and to back up into the veins near the skin. When that happens, blood can pool in your lower legs. The blood can then leak out of your veins, which can irritate your skin. This may cause a break in your skin that becomes a venous ulcer.    ImageVenous ulcer is the most common type of lower leg ulcer. You may have venous ulcers on one leg or on both legs. The area where this condition most commonly develops is around the ankles. A venous ulcer may last for a long time (chronic ulcer) or it may return repeatedly (recurrent ulcer).    What are the causes?  Any condition that causes poor circulation to your legs can lead to a venous ulcer.    What increases the risk?  This condition is more likely to develop in:    People who are 65 years of age or older.  People who are overweight.  People who are not active.  People who have had a leg ulcer in the past.  People who have clots in their lower leg veins (deep vein thrombosis).  People who have inflammation of their leg veins (phlebitis).  Women who have given birth.  People who smoke.    What are the signs or symptoms?  The most common symptom of this condition is an open sore near your ankle. Other symptoms may include:    Swelling.  Thickening of the skin.  Fluid leaking from the ulcer.  Bleeding.  Itching.  Pain and swelling that gets worse when you stand up and feels better when you raise your leg.  Blotchy skin.  Darkening of the skin.    How is this diagnosed?  Your health care provider may suspect a venous ulcer based on your medical history and your risk factors. Your health care provider will check the skin on your legs. Other tests may be done to learn more about the ulcer and to determine the best way to treat it. Tests that may be done include:    Measuring the blood pressure in your arms and legs.  Using sound waves (ultrasound) to measure the blood flow in your leg veins.    How is this treated?  You may need to try several different types of treatment to get your venous ulcer to heal. Healing may take a long time. Treatment may include:    Keeping your leg raised (elevated).  Wearing a type of bandage or stocking to compress the veins of your leg (compression therapy). Venous wounds are not likely to heal or to stay healed without compression.  Taking medicines to improve blood flow.  Taking antibiotic medicines to treat infection.  Cleaning your ulcer and removing any dead tissue from the wound (debridement).  Placing various types of medicated bandage (dressings) or medicated wraps on your ulcer. This helps the ulcer to heal and helps to prevent infection.    Surgery is sometimes needed to close the wound using a piece of skin taken from another area of your body (graft). You may need surgery if other treatments are not working or if your ulcer is very deep.    Follow these instructions at home:  Wound care     Follow instructions from your health care provider about:    How to take care of your wound.  When and how you should change your bandage (dressing).  When you should remove your dressing. If your dressing is dry and sticks to your leg when you try to remove it, moisten or wet the dressing with saline solution or water so that the dressing can be removed without harming your skin or wound tissue.    Check your wound every day for signs of infection. Have a caregiver do this for you if you are not able to do it yourself. Check for:    More redness, swelling, or pain.  More fluid or blood.  Pus, warmth, or a bad smell.    Medicines     Take over-the-counter and prescription medicines only as told by your health care provider.  If you were prescribed an antibiotic medicine, take it or apply it as told by your health care provider. Do not stop taking or using the antibiotic even if your condition improves.  Activity     Do not stand or sit in one position for a long period of time. Rest with your legs raised during the day. If possible, keep your legs above your heart for 30 minutes, 3–4 times a day, or as told by your health care provider.  Do not sit with your legs crossed.  Walk often to increase the blood flow in your legs. Ask your health care provider what level of activity is safe for you.  If you are taking a long ride in a car or plane, take a break to walk around at least once every two hours, or as often as your health care provider recommends. Ask your health care provider if you should take aspirin before long trips.  General instructions     Wear elastic stockings, compression stockings, or support hose as told by your health care provider. This is very important.  Raise the foot of your bed as told by your health care provider.  Do not smoke.  Keep all follow-up visits as told by your health care provider. This is important.  Contact a health care provider if:  You have a fever.  Your ulcer is getting larger or is not healing.  Your pain gets worse.  You have more redness or swelling around your ulcer.  You have more fluid, blood, or pus coming from your ulcer after it has been cleaned by you or your health care provider.  You have warmth or a bad smell coming from your ulcer.  This information is not intended to replace advice given to you by your health care provider. Make sure you discuss any questions you have with your health care provider.

## 2019-04-05 NOTE — ED PROVIDER NOTE - CLINICAL SUMMARY MEDICAL DECISION MAKING FREE TEXT BOX
47 y/o M with PMH of ESRD on HD (last dialysis ?Thursday), ?HTN, ?compliance with medications presents for pain to left foot. Pt with frequent ED visits for pain, ulcers to feet and ankles. Pt reports taking care of his ulcers which have been dry and seeing vascular surgery last Tuesday (3 days ago) at which time he states that had his dressings changed and is seeing vascular again in 4 days. Denies fevers, chills, injury, falls, purulent dc or bleeding. Has not taken anything for pain. No other complaints. Pt with chronic vascular changes noted to b/l LEs, dressing noted to left leg- mildly dirty, dressing taken down and  (+) chronic ulcer noted to left lateral malleolus with no discharge or cellulitis or signs of infection. Dressings changed. Pt given Tylenol for pain. To f/up as scheduled with Vascular next week. Also has a bottle of Keflex on him ?prescribed by Vascular- advised to complete that. Advised OTC Tylenol prn pain.

## 2019-04-05 NOTE — ED PROVIDER NOTE - MUSCULOSKELETAL, MLM
FROM of all joints. chronic vascular changes noted to b/l LEs, dressing noted to left leg- mildly dirty, dressing taken down and  (+) chronic ulcer noted to left lateral malleolus with no discharge or cellulitis or signs of infection, dressings reapplied.

## 2019-04-06 RX ADMIN — Medication 650 MILLIGRAM(S): at 00:41

## 2019-04-09 ENCOUNTER — APPOINTMENT (OUTPATIENT)
Dept: VASCULAR SURGERY | Facility: CLINIC | Age: 49
End: 2019-04-09
Payer: COMMERCIAL

## 2019-04-09 PROCEDURE — 99213 OFFICE O/P EST LOW 20 MIN: CPT

## 2019-04-10 ENCOUNTER — EMERGENCY (EMERGENCY)
Facility: HOSPITAL | Age: 49
LOS: 1 days | Discharge: HOME | End: 2019-04-10
Attending: EMERGENCY MEDICINE | Admitting: EMERGENCY MEDICINE
Payer: COMMERCIAL

## 2019-04-10 VITALS
RESPIRATION RATE: 18 BRPM | DIASTOLIC BLOOD PRESSURE: 89 MMHG | TEMPERATURE: 98 F | HEART RATE: 84 BPM | WEIGHT: 195.11 LBS | SYSTOLIC BLOOD PRESSURE: 141 MMHG | OXYGEN SATURATION: 97 %

## 2019-04-10 DIAGNOSIS — Z79.2 LONG TERM (CURRENT) USE OF ANTIBIOTICS: ICD-10-CM

## 2019-04-10 DIAGNOSIS — Z79.899 OTHER LONG TERM (CURRENT) DRUG THERAPY: ICD-10-CM

## 2019-04-10 DIAGNOSIS — L97.521 NON-PRESSURE CHRONIC ULCER OF OTHER PART OF LEFT FOOT LIMITED TO BREAKDOWN OF SKIN: ICD-10-CM

## 2019-04-10 PROCEDURE — 99282 EMERGENCY DEPT VISIT SF MDM: CPT | Mod: 25

## 2019-04-10 NOTE — ASSESSMENT
[Arterial/Venous Disease] : arterial/venous disease [FreeTextEntry1] : 47 yo male with new left foot ulcer for the past 3 weeks. UNNA boots x 2 weeks and wound is healed. Recommend he wear compression therapy with either ACE bandage or compression stockings. ACE bandage applied today. FU as needed.

## 2019-04-10 NOTE — ED ADULT NURSE NOTE - NSIMPLEMENTINTERV_GEN_ALL_ED
Implemented All Universal Safety Interventions:  [place] to call system. Call bell, personal items and telephone within reach. Instruct patient to call for assistance. Room bathroom lighting operational. Non-slip footwear when patient is off stretcher. Physically safe environment: no spills, clutter or unnecessary equipment. Stretcher in lowest position, wheels locked, appropriate side rails in place.

## 2019-04-10 NOTE — ED ADULT NURSE NOTE - OBJECTIVE STATEMENT
Received a 48 year old male with a chief complaint of left foot pain. Patient was seen in ED - reportedly on Tuesday for the same complaint. Patient ambulatory Arrival. PMH reported ESRD on HD (TTHS) with reported compliancy with therapy.

## 2019-04-10 NOTE — ED ADULT NURSE NOTE - NS ED NOTE ABUSE SUSPICION NEGLECT YN
I have reviewed discharge instructions with the patient. The patient verbalized understanding. Patient left ED via Discharge Method: ambulatory to Home    Opportunity for questions and clarification provided. Patient given 1 scripts. To continue your aftercare when you leave the hospital, you may receive an automated call from our care team to check in on how you are doing. This is a free service and part of our promise to provide the best care and service to meet your aftercare needs.  If you have questions, or wish to unsubscribe from this service please call [phone]. Thank you for Choosing our [facility] Emergency Department.
No

## 2019-04-10 NOTE — HISTORY OF PRESENT ILLNESS
[FreeTextEntry1] : 48 year old male with hx of ESRD presenting to the office for bilateral lower extremity edema and left foot ulcer. He has been wearing UNNA boot for the past 2 weeks, tolerating well but very itchy. Ulcer still painful, but better after he stops walking on it.

## 2019-04-10 NOTE — PHYSICAL EXAM
[Respiratory Effort] : normal respiratory effort [Normal Heart Sounds] : normal heart sounds [2+] : left 2+ [Ankle Swelling (On Exam)] : present [Ankle Swelling Bilaterally] : severe [Calm] : calm [de-identified] : bilateral legs are very dry, he has left lateral foot ulcer now completely healed [de-identified] : overweight, appears older than stated age

## 2019-04-11 PROCEDURE — 99282 EMERGENCY DEPT VISIT SF MDM: CPT

## 2019-04-11 RX ORDER — BACITRACIN ZINC 500 UNIT/G
1 OINTMENT IN PACKET (EA) TOPICAL ONCE
Qty: 0 | Refills: 0 | Status: COMPLETED | OUTPATIENT
Start: 2019-04-11 | End: 2019-04-11

## 2019-04-11 RX ORDER — ACETAMINOPHEN 500 MG
650 TABLET ORAL ONCE
Qty: 0 | Refills: 0 | Status: COMPLETED | OUTPATIENT
Start: 2019-04-11 | End: 2019-04-11

## 2019-04-11 RX ADMIN — Medication 650 MILLIGRAM(S): at 00:38

## 2019-04-11 RX ADMIN — Medication 1 APPLICATION(S): at 00:38

## 2019-04-11 NOTE — ED PROVIDER NOTE - OBJECTIVE STATEMENT
48 m co foot ulcer- px w hx of esrd on hd- co chronic L foot ulcer pain  px has worn 2 cristhian boots- ulcer has healed significantly, now w thick dry flaking skin  + persistent pain at the site  no f/c   walking without difficulty  took a tylenol w partial relief

## 2019-04-11 NOTE — ED PROVIDER NOTE - CLINICAL SUMMARY MEDICAL DECISION MAKING FREE TEXT BOX
chronic ulcer- markedly improved- local wound care only- no si/sx requiring emergent HD, no sob, lungs cta, vss  d/c to fu with his normal HD center

## 2019-04-12 ENCOUNTER — EMERGENCY (EMERGENCY)
Facility: HOSPITAL | Age: 49
LOS: 1 days | Discharge: HOME | End: 2019-04-12
Attending: EMERGENCY MEDICINE | Admitting: EMERGENCY MEDICINE
Payer: COMMERCIAL

## 2019-04-12 VITALS
DIASTOLIC BLOOD PRESSURE: 91 MMHG | SYSTOLIC BLOOD PRESSURE: 150 MMHG | RESPIRATION RATE: 18 BRPM | OXYGEN SATURATION: 97 % | WEIGHT: 192.9 LBS | HEIGHT: 69 IN | TEMPERATURE: 98 F | HEART RATE: 84 BPM

## 2019-04-12 VITALS
SYSTOLIC BLOOD PRESSURE: 146 MMHG | RESPIRATION RATE: 16 BRPM | HEART RATE: 86 BPM | DIASTOLIC BLOOD PRESSURE: 68 MMHG | TEMPERATURE: 99 F | OXYGEN SATURATION: 94 %

## 2019-04-12 DIAGNOSIS — H53.149 VISUAL DISCOMFORT, UNSPECIFIED: ICD-10-CM

## 2019-04-12 DIAGNOSIS — Z79.2 LONG TERM (CURRENT) USE OF ANTIBIOTICS: ICD-10-CM

## 2019-04-12 DIAGNOSIS — R06.02 SHORTNESS OF BREATH: ICD-10-CM

## 2019-04-12 DIAGNOSIS — Z79.899 OTHER LONG TERM (CURRENT) DRUG THERAPY: ICD-10-CM

## 2019-04-12 DIAGNOSIS — R51 HEADACHE: ICD-10-CM

## 2019-04-12 DIAGNOSIS — R42 DIZZINESS AND GIDDINESS: ICD-10-CM

## 2019-04-12 DIAGNOSIS — I77.0 ARTERIOVENOUS FISTULA, ACQUIRED: Chronic | ICD-10-CM

## 2019-04-12 LAB
ALBUMIN SERPL ELPH-MCNC: 4.2 G/DL — SIGNIFICANT CHANGE UP (ref 3.3–5)
ALP SERPL-CCNC: 63 U/L — SIGNIFICANT CHANGE UP (ref 40–120)
ALT FLD-CCNC: 23 U/L — SIGNIFICANT CHANGE UP (ref 10–45)
ANION GAP SERPL CALC-SCNC: 17 MMOL/L — SIGNIFICANT CHANGE UP (ref 5–17)
APTT BLD: 27.4 SEC — LOW (ref 27.5–36.3)
AST SERPL-CCNC: 34 U/L — SIGNIFICANT CHANGE UP (ref 10–40)
BASOPHILS # BLD AUTO: 0.13 K/UL — SIGNIFICANT CHANGE UP (ref 0–0.2)
BASOPHILS NFR BLD AUTO: 1.8 % — SIGNIFICANT CHANGE UP (ref 0–2)
BILIRUB SERPL-MCNC: 0.4 MG/DL — SIGNIFICANT CHANGE UP (ref 0.2–1.2)
BUN SERPL-MCNC: 15 MG/DL — SIGNIFICANT CHANGE UP (ref 7–23)
CALCIUM SERPL-MCNC: 9.4 MG/DL — SIGNIFICANT CHANGE UP (ref 8.4–10.5)
CHLORIDE SERPL-SCNC: 96 MMOL/L — SIGNIFICANT CHANGE UP (ref 96–108)
CO2 SERPL-SCNC: 28 MMOL/L — SIGNIFICANT CHANGE UP (ref 22–31)
CREAT SERPL-MCNC: 8.19 MG/DL — HIGH (ref 0.5–1.3)
EOSINOPHIL # BLD AUTO: 2.23 K/UL — HIGH (ref 0–0.5)
EOSINOPHIL NFR BLD AUTO: 29.8 % — HIGH (ref 0–6)
GLUCOSE SERPL-MCNC: 92 MG/DL — SIGNIFICANT CHANGE UP (ref 70–99)
HCT VFR BLD CALC: 27.8 % — LOW (ref 39–50)
HGB BLD-MCNC: 9.2 G/DL — LOW (ref 13–17)
INR BLD: 1.17 — HIGH (ref 0.88–1.16)
LYMPHOCYTES # BLD AUTO: 0.26 K/UL — LOW (ref 1–3.3)
LYMPHOCYTES # BLD AUTO: 3.5 % — LOW (ref 13–44)
MAGNESIUM SERPL-MCNC: 1.9 MG/DL — SIGNIFICANT CHANGE UP (ref 1.6–2.6)
MCHC RBC-ENTMCNC: 31.1 PG — SIGNIFICANT CHANGE UP (ref 27–34)
MCHC RBC-ENTMCNC: 33.1 GM/DL — SIGNIFICANT CHANGE UP (ref 32–36)
MCV RBC AUTO: 93.9 FL — SIGNIFICANT CHANGE UP (ref 80–100)
MONOCYTES # BLD AUTO: 0.26 K/UL — SIGNIFICANT CHANGE UP (ref 0–0.9)
MONOCYTES NFR BLD AUTO: 3.5 % — SIGNIFICANT CHANGE UP (ref 2–14)
NEUTROPHILS # BLD AUTO: 4.53 K/UL — SIGNIFICANT CHANGE UP (ref 1.8–7.4)
NEUTROPHILS NFR BLD AUTO: 60.5 % — SIGNIFICANT CHANGE UP (ref 43–77)
NT-PROBNP SERPL-SCNC: 8411 PG/ML — HIGH (ref 0–300)
PHOSPHATE SERPL-MCNC: 3.8 MG/DL — SIGNIFICANT CHANGE UP (ref 2.5–4.5)
PLATELET # BLD AUTO: 223 K/UL — SIGNIFICANT CHANGE UP (ref 150–400)
POTASSIUM SERPL-MCNC: 3.4 MMOL/L — LOW (ref 3.5–5.3)
POTASSIUM SERPL-SCNC: 3.4 MMOL/L — LOW (ref 3.5–5.3)
PROT SERPL-MCNC: 8.9 G/DL — HIGH (ref 6–8.3)
PROTHROM AB SERPL-ACNC: 13.3 SEC — HIGH (ref 10–12.9)
RBC # BLD: 2.96 M/UL — LOW (ref 4.2–5.8)
RBC # FLD: 14.6 % — HIGH (ref 10.3–14.5)
SODIUM SERPL-SCNC: 141 MMOL/L — SIGNIFICANT CHANGE UP (ref 135–145)
TROPONIN T SERPL-MCNC: 0.1 NG/ML — CRITICAL HIGH (ref 0–0.01)
TROPONIN T SERPL-MCNC: 0.11 NG/ML — CRITICAL HIGH (ref 0–0.01)
WBC # BLD: 7.49 K/UL — SIGNIFICANT CHANGE UP (ref 3.8–10.5)
WBC # FLD AUTO: 7.49 K/UL — SIGNIFICANT CHANGE UP (ref 3.8–10.5)

## 2019-04-12 PROCEDURE — 36415 COLL VENOUS BLD VENIPUNCTURE: CPT

## 2019-04-12 PROCEDURE — 71045 X-RAY EXAM CHEST 1 VIEW: CPT | Mod: 26

## 2019-04-12 PROCEDURE — 70450 CT HEAD/BRAIN W/O DYE: CPT | Mod: 26

## 2019-04-12 PROCEDURE — 93010 ELECTROCARDIOGRAM REPORT: CPT

## 2019-04-12 PROCEDURE — 71045 X-RAY EXAM CHEST 1 VIEW: CPT

## 2019-04-12 PROCEDURE — 85730 THROMBOPLASTIN TIME PARTIAL: CPT

## 2019-04-12 PROCEDURE — 96360 HYDRATION IV INFUSION INIT: CPT

## 2019-04-12 PROCEDURE — 93005 ELECTROCARDIOGRAM TRACING: CPT

## 2019-04-12 PROCEDURE — 84484 ASSAY OF TROPONIN QUANT: CPT

## 2019-04-12 PROCEDURE — 83880 ASSAY OF NATRIURETIC PEPTIDE: CPT

## 2019-04-12 PROCEDURE — 85610 PROTHROMBIN TIME: CPT

## 2019-04-12 PROCEDURE — 99285 EMERGENCY DEPT VISIT HI MDM: CPT | Mod: 25

## 2019-04-12 PROCEDURE — 96361 HYDRATE IV INFUSION ADD-ON: CPT

## 2019-04-12 PROCEDURE — 99284 EMERGENCY DEPT VISIT MOD MDM: CPT | Mod: 25

## 2019-04-12 PROCEDURE — 83735 ASSAY OF MAGNESIUM: CPT

## 2019-04-12 PROCEDURE — 80053 COMPREHEN METABOLIC PANEL: CPT

## 2019-04-12 PROCEDURE — 70450 CT HEAD/BRAIN W/O DYE: CPT

## 2019-04-12 PROCEDURE — 84100 ASSAY OF PHOSPHORUS: CPT

## 2019-04-12 PROCEDURE — 85025 COMPLETE CBC W/AUTO DIFF WBC: CPT

## 2019-04-12 RX ORDER — SODIUM CHLORIDE 9 MG/ML
250 INJECTION INTRAMUSCULAR; INTRAVENOUS; SUBCUTANEOUS ONCE
Qty: 0 | Refills: 0 | Status: COMPLETED | OUTPATIENT
Start: 2019-04-12 | End: 2019-04-12

## 2019-04-12 RX ORDER — ACETAMINOPHEN 500 MG
650 TABLET ORAL ONCE
Qty: 0 | Refills: 0 | Status: COMPLETED | OUTPATIENT
Start: 2019-04-12 | End: 2019-04-12

## 2019-04-12 RX ADMIN — SODIUM CHLORIDE 250 MILLILITER(S): 9 INJECTION INTRAMUSCULAR; INTRAVENOUS; SUBCUTANEOUS at 08:26

## 2019-04-12 RX ADMIN — SODIUM CHLORIDE 250 MILLILITER(S): 9 INJECTION INTRAMUSCULAR; INTRAVENOUS; SUBCUTANEOUS at 06:52

## 2019-04-12 RX ADMIN — Medication 650 MILLIGRAM(S): at 06:52

## 2019-04-12 RX ADMIN — Medication 650 MILLIGRAM(S): at 08:27

## 2019-04-12 NOTE — ED PROVIDER NOTE - NSFOLLOWUPINSTRUCTIONS_ED_ALL_ED_FT
Follow up with your physician in 2-3 days.          Shortness of Breath, Adult  ImageShortness of breath means you have trouble breathing. Your lungs are organs for breathing.    Follow these instructions at home:  Pay attention to any changes in your symptoms. Take these actions to help with your condition:    Do not smoke. Smoking can cause shortness of breath. If you need help to quit smoking, ask your doctor.  Avoid things that can make it harder to breathe, such as:    Mold.  Dust.  Air pollution.  Chemical smells.  Things that can cause allergy symptoms (allergens), if you have allergies.    Keep your living space clean and free of mold and dust.  Rest as needed. Slowly return to your usual activities.  Take over-the-counter and prescription medicines, including oxygen and inhaled medicines, only as told by your doctor.  Keep all follow-up visits as told by your doctor. This is important.    Contact a doctor if:  Your condition does not get better as soon as expected.  You have a hard time doing your normal activities, even after you rest.  You have new symptoms.  Get help right away if:  You have trouble breathing when you are resting.  You feel light-headed or you faint.  You have a cough that is not helped by medicines.  You cough up blood.  You have pain with breathing.  You have pain in your chest, arms, shoulders, or belly (abdomen).  You have a fever.  You cannot walk up stairs.  You cannot exercise the way you normally do.  This information is not intended to replace advice given to you by your health care provider. Make sure you discuss any questions you have with your health care provider.    Document Released: 06/05/2009 Document Revised: 01/04/2018 Document Reviewed: 01/04/2018  [employer] Interactive Patient Education © 2019 [employer]

## 2019-04-12 NOTE — ED ADULT NURSE REASSESSMENT NOTE - NS ED NURSE REASSESS COMMENT FT1
Pt sleeping at this time, rouses to sound of his name. Pt reports no chest pain, no shortness of breath at this time. Will continue to monitor. S/p right terri-colectomy for colon CA 8/2.  Tolerating clears. NKFA, no food preferences.

## 2019-04-12 NOTE — ED PROVIDER NOTE - CLINICAL SUMMARY MEDICAL DECISION MAKING FREE TEXT BOX
feeling unwell after dialysis, SOB, however no resp distress, no airway compromise, no hypoxia, no tachypnea, no tachycardia. no chest pain. doubt ACS, doubt PE.  HA, lightheadedness, no focal neuro deficits, doubt CVA  -check labs, ekg  -will give small bolus, pt may be overdialyzed  -CT head, cxr

## 2019-04-12 NOTE — ED PROVIDER NOTE - OBJECTIVE STATEMENT
48M hx ESRD (TRS), c/o feeling unwell since dialysis yesterday. pt states feeling SOB, no cough, no chest pain. no fevers. no n/v/d. states also having headache and feeling like he might pass out.  some photophobia. no numbness/weakness. no slurred speech.

## 2019-04-12 NOTE — ED PROVIDER NOTE - PROGRESS NOTE DETAILS
Pt given fluid and slept in ED. upon awakening pt reports sob improved and feeling better. will repeat troponin repeat torponin 0.10 and sx's improved. suspect over dialyzed. f/u with pmd D/w pt ct findings including encephalomalacia and ischemic disease. copy given to pt to bring to pmd

## 2019-04-26 ENCOUNTER — EMERGENCY (EMERGENCY)
Facility: HOSPITAL | Age: 49
LOS: 1 days | Discharge: HOME | End: 2019-04-26
Attending: EMERGENCY MEDICINE | Admitting: EMERGENCY MEDICINE
Payer: COMMERCIAL

## 2019-04-26 VITALS
SYSTOLIC BLOOD PRESSURE: 138 MMHG | HEART RATE: 98 BPM | TEMPERATURE: 99 F | HEIGHT: 69 IN | WEIGHT: 178.57 LBS | OXYGEN SATURATION: 98 % | DIASTOLIC BLOOD PRESSURE: 70 MMHG | RESPIRATION RATE: 17 BRPM

## 2019-04-26 VITALS
OXYGEN SATURATION: 96 % | HEART RATE: 98 BPM | TEMPERATURE: 99 F | DIASTOLIC BLOOD PRESSURE: 72 MMHG | SYSTOLIC BLOOD PRESSURE: 104 MMHG | RESPIRATION RATE: 16 BRPM

## 2019-04-26 DIAGNOSIS — I77.0 ARTERIOVENOUS FISTULA, ACQUIRED: Chronic | ICD-10-CM

## 2019-04-26 PROCEDURE — 99283 EMERGENCY DEPT VISIT LOW MDM: CPT

## 2019-04-26 RX ORDER — METOCLOPRAMIDE HCL 10 MG
10 TABLET ORAL ONCE
Qty: 0 | Refills: 0 | Status: COMPLETED | OUTPATIENT
Start: 2019-04-26 | End: 2019-04-26

## 2019-04-26 RX ORDER — ACETAMINOPHEN 500 MG
1000 TABLET ORAL ONCE
Qty: 0 | Refills: 0 | Status: COMPLETED | OUTPATIENT
Start: 2019-04-26 | End: 2019-04-26

## 2019-04-26 RX ORDER — SUMATRIPTAN SUCCINATE 4 MG/.5ML
50 INJECTION, SOLUTION SUBCUTANEOUS ONCE
Qty: 0 | Refills: 0 | Status: COMPLETED | OUTPATIENT
Start: 2019-04-26 | End: 2019-04-26

## 2019-04-26 RX ADMIN — Medication 10 MILLIGRAM(S): at 18:11

## 2019-04-26 RX ADMIN — SUMATRIPTAN SUCCINATE 50 MILLIGRAM(S): 4 INJECTION, SOLUTION SUBCUTANEOUS at 19:32

## 2019-04-26 RX ADMIN — Medication 1000 MILLIGRAM(S): at 18:11

## 2019-04-26 RX ADMIN — SUMATRIPTAN SUCCINATE 50 MILLIGRAM(S): 4 INJECTION, SOLUTION SUBCUTANEOUS at 19:30

## 2019-04-26 RX ADMIN — Medication 1000 MILLIGRAM(S): at 19:11

## 2019-04-26 NOTE — ED PROVIDER NOTE - OBJECTIVE STATEMENT
49 y/o undomiciled M w/hx ESRD on dialysis T/Th/Sat, received full round of dialysis yesterday, has been having R sided retroborbital HA on/off for past several days, more prominent after dialysis but also started again this afternoon. Sx are similar to compliants of prior ED visits. Last visit with c/o HA 2wks ago w/negative workup (CT, labs). States that today's HA is no different, but was unimproved with OTC meds. + mild R sided photophobai. No phonophobia or tinnitus. No neck pain/stiffness. No fever/chills. Denies any other constitutional sx. No CP, SOB or palpitations. Received full round of dialysis yesterday w/out problem. Never been evaluated by neuro for this history of HA.

## 2019-04-26 NOTE — ED PROVIDER NOTE - NSFOLLOWUPINSTRUCTIONS_ED_ALL_ED_FT
Log Out.    [employer] Private® CareNotes®     :  [facility]             MIGRAINE HEADACHE - AfterCare(R) Instructions(ER/ED)     Migraine Headache    WHAT YOU NEED TO KNOW:    A migraine is a severe headache. The pain can be so severe that it interferes with your daily activities. A migraine can last a few hours up to several days. The exact cause of migraines is not known.     DISCHARGE INSTRUCTIONS:    Return to the emergency department if:     You have a headache that seems different or much worse than your usual migraine headache.      You have a severe headache with a fever or a stiff neck.       You have new problems with speech, vision, balance, or movement.      You feel like you are going to faint, you become confused, or you have a seizure.     Contact your healthcare provider or neurologist if:     Your migraines interfere with your daily activities.       Your medicines or treatments stop working.      You have questions or concerns about your condition or care.    Medicines: You may need any of the following. Take medicine as soon as you feel a migraine begin.    Prescription pain medicine may be given. Do not wait until the pain is severe before you take your medicine.       Migraine medicines are used to help prevent a migraine or stop it once it starts.       Antinausea medicine may be given to calm your stomach and to help prevent vomiting. This medicine can also help relieve pain.      Take your medicine as directed. Contact your healthcare provider if you think your medicine is not helping or if you have side effects. Tell him or her if you are allergic to any medicine. Keep a list of the medicines, vitamins, and herbs you take. Include the amounts, and when and why you take them. Bring the list or the pill bottles to follow-up visits. Carry your medicine list with you in case of an emergency.    Manage your symptoms:     Rest in a dark, quiet room. This will help decrease your pain. Sleep may also help relieve the pain.      Apply ice to decrease pain. Use an ice pack, or put crushed ice in a plastic bag. Cover the ice pack with a towel and place it on your head. Apply ice for 15 to 20 minutes every hour.      Apply heat to decrease pain and muscle spasms. Use a small towel dampened with warm water or a heating pad, or sit in a warm bath. Apply heat on the area for 20 to 30 minutes every 2 hours. You may alternate heat and ice.      Keep a migraine record. Write down when your migraines start and stop. Include your symptoms and what you were doing when a migraine began. Record what you ate or drank for 24 hours before the migraine started. Keep track of what you did to treat your migraine and if it worked. Bring the migraine record with you to visits with your healthcare provider.    Follow up with your healthcare provider or neurologist as directed: Bring your migraine record with you. Write down your questions so you remember to ask them during your visits.    Prevent another migraine:     Do not smoke. Nicotine and other chemicals in cigarettes and cigars can trigger a migraine or make it worse. Ask your healthcare provider for information if you currently smoke and need help to quit. E-cigarettes or smokeless tobacco still contain nicotine. Talk to your healthcare provider before you use these products.       Do not drink alcohol. Alcohol can trigger a migraine. It can also keep medicines used to treat your migraines from working.      Get regular exercise. Exercise may help prevent migraines. Talk to your healthcare provider about the best exercise plan for you. Try to get at least 30 minutes of exercise on most days.      Manage stress. Stress may trigger a migraine. Learn new ways to relax, such as deep breathing.      Create a sleep schedule. Go to bed and get up at the same times each day. Do not watch television before bed.      Eat regular meals. Include healthy foods such as include fruit, vegetables, whole-grain breads, low-fat dairy products, beans, lean meat, and fish. Do not have food or drinks that trigger your migraines.         © Copyright [employer] 2019 All illustrations and images included in CareNotes are the copyrighted property of [employer] or [employer].      back to top                      © Copyright [employer] 2019

## 2019-04-26 NOTE — ED ADULT NURSE NOTE - OBJECTIVE STATEMENT
c/o chronic headache and dizziness that he experienced after hemodialysis. However, pt states that usually the symptoms goes away after sleeping. This time, the pain persisted after he woke up this morning. Denies n/v, vision changes, unilateral weakness, fever, chills or any other complaints.

## 2019-04-26 NOTE — ED ADULT NURSE NOTE - NSIMPLEMENTINTERV_GEN_ALL_ED
Implemented All Universal Safety Interventions:  [place] to call system. Call bell, personal items and telephone within reach. Instruct patient to call for assistance. Room bathroom lighting operational. Non-slip footwear when patient is off stretcher. Physically safe environment: no spills, clutter or unnecessary equipment. Stretcher in lowest position, wheels locked, appropriate side rails in place.

## 2019-04-26 NOTE — ED PROVIDER NOTE - CLINICAL SUMMARY MEDICAL DECISION MAKING FREE TEXT BOX
HA consistent with prior HA's, HDS and comfortable, found sleeping, HA aborted in ED, had recent workup in ED for same sx which was negative, would benefit from neurologic f/u. Given return precautions.

## 2019-04-26 NOTE — ED PROVIDER NOTE - PHYSICAL EXAMINATION
VITAL SIGNS: I have reviewed nursing notes and confirm.  CONSTITUTIONAL: Well-developed; well-nourished male found sleeping in chair, easily arousable, A&Ox3; in no acute distress.  SKIN: Agree with RN documentation regarding decubitus evaluation. Remainder of skin exam is warm and dry, no acute rash.  HEAD: Normocephalic; atraumatic.  EYES: PERRL, EOM intact; conjunctiva and sclera clear.  ENT: No nasal discharge; airway clear.  NECK: Supple; non tender.  CARD: S1, S2 normal; no murmurs, gallops, or rubs. Regular rate and rhythm.  RESP: No wheezes, rales or rhonchi.  ABD: Normal bowel sounds; soft; non-distended; non-tender  EXT: Normal ROM. No clubbing, cyanosis or edema.  LYMPH: No acute cervical adenopathy.  NEURO: Alert, oriented. Grossly unremarkable. CN 2-12 intact b/l 5/5 strength all ext, no sensory deficits, speech intact  PSYCH: Cooperative, appropriate.

## 2019-04-26 NOTE — ED ADULT TRIAGE NOTE - OTHER COMPLAINTS
pt c.o ha since yesterday after completing dialysis. admits to photosensitivity. pt sleeping in triage.

## 2019-04-30 DIAGNOSIS — G43.909 MIGRAINE, UNSPECIFIED, NOT INTRACTABLE, WITHOUT STATUS MIGRAINOSUS: ICD-10-CM

## 2019-04-30 DIAGNOSIS — Z79.2 LONG TERM (CURRENT) USE OF ANTIBIOTICS: ICD-10-CM

## 2019-04-30 DIAGNOSIS — R51 HEADACHE: ICD-10-CM

## 2019-04-30 DIAGNOSIS — Z99.2 DEPENDENCE ON RENAL DIALYSIS: ICD-10-CM

## 2019-04-30 DIAGNOSIS — Z79.899 OTHER LONG TERM (CURRENT) DRUG THERAPY: ICD-10-CM

## 2019-05-05 ENCOUNTER — EMERGENCY (EMERGENCY)
Facility: HOSPITAL | Age: 49
LOS: 1 days | Discharge: HOME | End: 2019-05-05
Attending: EMERGENCY MEDICINE | Admitting: EMERGENCY MEDICINE
Payer: COMMERCIAL

## 2019-05-05 VITALS
OXYGEN SATURATION: 100 % | HEART RATE: 92 BPM | DIASTOLIC BLOOD PRESSURE: 71 MMHG | SYSTOLIC BLOOD PRESSURE: 165 MMHG | TEMPERATURE: 98 F | RESPIRATION RATE: 18 BRPM

## 2019-05-05 DIAGNOSIS — I77.0 ARTERIOVENOUS FISTULA, ACQUIRED: Chronic | ICD-10-CM

## 2019-05-05 DIAGNOSIS — R21 RASH AND OTHER NONSPECIFIC SKIN ERUPTION: ICD-10-CM

## 2019-05-05 DIAGNOSIS — L29.9 PRURITUS, UNSPECIFIED: ICD-10-CM

## 2019-05-05 DIAGNOSIS — Z79.2 LONG TERM (CURRENT) USE OF ANTIBIOTICS: ICD-10-CM

## 2019-05-05 DIAGNOSIS — Z79.899 OTHER LONG TERM (CURRENT) DRUG THERAPY: ICD-10-CM

## 2019-05-05 PROCEDURE — 99283 EMERGENCY DEPT VISIT LOW MDM: CPT

## 2019-05-05 RX ORDER — DIPHENHYDRAMINE HCL 50 MG
50 CAPSULE ORAL ONCE
Qty: 0 | Refills: 0 | Status: DISCONTINUED | OUTPATIENT
Start: 2019-05-05 | End: 2019-05-09

## 2019-05-05 RX ORDER — PERMETHRIN CREAM 5% W/W 50 MG/G
1 CREAM TOPICAL
Qty: 60 | Refills: 0
Start: 2019-05-05 | End: 2019-05-05

## 2019-05-05 NOTE — ED PROVIDER NOTE - CLINICAL SUMMARY MEDICAL DECISION MAKING FREE TEXT BOX
suspect scabies, less likely uremia as had dialysis yesterday, consider bedbugs,   empiric permethrin, discussed home inspection, protective ointment given  discussed emergent return instructions

## 2019-05-05 NOTE — ED ADULT TRIAGE NOTE - CHIEF COMPLAINT QUOTE
patient complains of itching. he states "I think its from dialysis" patient had dialysis yesterday. bugs found on patient clothes. patient lives at shelter

## 2019-05-05 NOTE — ED ADULT NURSE NOTE - NSIMPLEMENTINTERV_GEN_ALL_ED
Implemented All Universal Safety Interventions:  [place] to call system. Call bell, personal items and telephone within reach. Instruct patient to call for assistance. Room bathroom lighting operational. Non-slip footwear when patient is off stretcher. Physically safe environment: no spills, clutter or unnecessary equipment. Stretcher in lowest position, wheels locked, appropriate side rails in place.

## 2019-05-05 NOTE — ED PROVIDER NOTE - OBJECTIVE STATEMENT
itching  47 yo with diffuse itching for weeks, pt with "bugs" on arrival and patient [facility]'ed. patient or myself did not see these, unclear what type.  patient reports last itching  49 yo with diffuse itching for weeks, pt with "bugs" on arrival and patient [facility]'ed. patient or myself did not see these, unclear what type.  patient reports last dialysis yesterday.

## 2019-05-05 NOTE — ED PROVIDER NOTE - NSFOLLOWUPINSTRUCTIONS_ED_ALL_ED_FT
You were given a Permethrin prescription for suspected scabies, apply head to toe not including face one time overnight.     Scabies, Adult  Scabies is a skin condition that happens when very small insects get under the skin (infestation). This causes a rash and severe itchiness. Scabies can spread from person to person (is contagious). If you get scabies, it is common for others in your household to get scabies too.    With proper treatment, symptoms usually go away in 2–4 weeks. Scabies usually does not cause lasting problems.    What are the causes?  This condition is caused by mites (Sarcoptes scabiei, or human itch mites) that can only be seen with a microscope. The mites get into the top layer of skin and lay eggs. Scabies can spread from person to person through:  Close contact with a person who has scabies.  Contact with infested items, such as towels, bedding, or clothing.  What increases the risk?  This condition is more likely to develop in:  People who live in nursing homes and other extended-care facilities.  People who have sexual contact with a partner who has scabies.  Young children who attend  facilities.  People who care for others who are at increased risk for scabies.  What are the signs or symptoms?  Symptoms of this condition may include:  Severe itchiness. This is often worse at night.  A rash that includes tiny red bumps or blisters. The rash commonly occurs on the wrist, elbow, armpit, fingers, waist, groin, or buttocks. Bumps may form a line (burrow) in some areas.  Skin irritation. This can include scaly patches or sores.  How is this diagnosed?  This condition is diagnosed with a physical exam. Your health care provider will look closely at your skin. In some cases, your health care provider may take a sample of your affected skin (skin scraping) and have it examined under a microscope.    How is this treated?  This condition may be treated with:  Medicated cream or lotion that kills the mites. This is spread on the entire body and left on for several hours. Usually, one treatment with medicated cream or lotion is enough to kill all of the mites. In severe cases, the treatment may be repeated.  Medicated cream that relieves itching.  Medicines that help to relieve itching.  Medicines that kill the mites. This treatment is rarely used.  Follow these instructions at home:  Image   Medicines     Take or apply over-the-counter and prescription medicines as told by your health care provider.  Apply medicated cream or lotion as told by your health care provider.  Do not wash off the medicated cream or lotion until the necessary amount of time has passed.  Skin Care     Avoid scratching your affected skin.  Keep your fingernails closely trimmed to reduce injury from scratching.  Take cool baths or apply cool washcloths to help reduce itching.  General instructions     Clean all items that you recently had contact with, including bedding, clothing, and furniture. Do this on the same day that your treatment starts.  Use hot water when you wash items.  Place unwashable items into closed, airtight plastic bags for at least 3 days. The mites cannot live for more than 3 days away from human skin.  Vacuum furniture and mattresses that you use.  Make sure that other people who may have been infested are examined by a health care provider. These include members of your household and anyone who may have had contact with infested items.  Keep all follow-up visits as told by your health care provider. This is important.  Contact a health care provider if:  You have itching that does not go away after 4 weeks of treatment.  You continue to develop new bumps or burrows.  You have redness, swelling, or pain in your rash area after treatment.  You have fluid, blood, or pus coming from your rash.  This information is not intended to replace advice given to you by your health care provider. Make sure you discuss any questions you have with your health care provider.

## 2019-05-05 NOTE — ED ADULT NURSE NOTE - OBJECTIVE STATEMENT
Pt to ER w/ report or itching.  Insects noted on clothes during triage.  Pt domiciled in shelter.  Hx ESRD w/ successful hemodialysis yesterday.  Pt denies cp/sob/f/c/n/v.  Breathing unlabored, skin warm and dry. Pt to decon room for decontamination.  Will continue to monitor.

## 2019-05-08 ENCOUNTER — EMERGENCY (EMERGENCY)
Facility: HOSPITAL | Age: 49
LOS: 1 days | Discharge: HOME | End: 2019-05-08
Attending: EMERGENCY MEDICINE | Admitting: EMERGENCY MEDICINE
Payer: COMMERCIAL

## 2019-05-08 VITALS
TEMPERATURE: 98 F | RESPIRATION RATE: 20 BRPM | OXYGEN SATURATION: 97 % | HEART RATE: 78 BPM | WEIGHT: 179.9 LBS | SYSTOLIC BLOOD PRESSURE: 151 MMHG | DIASTOLIC BLOOD PRESSURE: 78 MMHG | HEIGHT: 64 IN

## 2019-05-08 VITALS
OXYGEN SATURATION: 100 % | DIASTOLIC BLOOD PRESSURE: 83 MMHG | RESPIRATION RATE: 20 BRPM | TEMPERATURE: 98 F | SYSTOLIC BLOOD PRESSURE: 147 MMHG | HEART RATE: 79 BPM

## 2019-05-08 DIAGNOSIS — I77.0 ARTERIOVENOUS FISTULA, ACQUIRED: Chronic | ICD-10-CM

## 2019-05-08 PROCEDURE — 82962 GLUCOSE BLOOD TEST: CPT

## 2019-05-08 PROCEDURE — 99283 EMERGENCY DEPT VISIT LOW MDM: CPT

## 2019-05-08 PROCEDURE — 99284 EMERGENCY DEPT VISIT MOD MDM: CPT | Mod: 25

## 2019-05-08 PROCEDURE — 93005 ELECTROCARDIOGRAM TRACING: CPT

## 2019-05-08 PROCEDURE — 93010 ELECTROCARDIOGRAM REPORT: CPT

## 2019-05-08 NOTE — ED ADULT NURSE NOTE - CHPI ED NUR SYMPTOMS NEG
no change in level of consciousness/no confusion/no vomiting/no loss of consciousness/no blurred vision/no fever/no weakness/no dizziness/no nausea/no numbness

## 2019-05-08 NOTE — ED PROVIDER NOTE - OBJECTIVE STATEMENT
47 yo undomiciled male h/o ESRD on dialysis T/Th/Sat s/p recent ed visits for bugs s/p decon 2 d ago, ha (neg ct head), feeling unwell felt to be 2/2 over-dialyzation and improved w ivf c/o 47 yo undomiciled male h/o ESRD on dialysis T/Th/Sat s/p recent ed visits for bugs s/p decon 2 d ago, ha (neg ct head), feeling unwell felt to be 2/2 over-dialyzation and improved w ivf c/o gen malaise, lh, chills, "not feeling well" since finishing dialysis on Tues.  Pt seen at [facility] earlier tonight and discharged - dc papers reviewed and showed wbc 11.3, hgb low but c/w prev, chem c/w pt's known esrd, pH 7.28 - papers and labs scanned in to pt's chart.  Pt also given elimite for scabies and reports he used the cream prescribed by [facility] ed 2 d ago for his scabies - reports itching is slightly better.  No ha, change in speech/gait, numbness or weakness in ext, cp, palpitations, sob, abd pain, n/v/d, uri sx, cough, dysuria (reports he still makes urine).  Pt notes blurred vision earlier, none now.  Pt also notes resolved muscle cramping after hd.

## 2019-05-08 NOTE — ED ADULT NURSE REASSESSMENT NOTE - NS ED NURSE REASSESS COMMENT FT1
Patient sleeping in chair. Patient easy to awake. Patient given food to consume. Patient able to tolerate oral consumption.

## 2019-05-08 NOTE — ED PROVIDER NOTE - CLINICAL SUMMARY MEDICAL DECISION MAKING FREE TEXT BOX
Pt c/o dizziness/lh sensation, gen malaise.  Afebrile, no uri sx to suggest viral uri.  No cp or other cardiac complaints.  EKG w/o stemi.  FS 67.  Labs from earlier ed visit at [facility] reviewed - results as noted, no sig concern w current results.  I suspect pt w poss mild overdiuresis and dehydration and hypoglycemia.  Pt given po hydration and food/juice.  Will reassess.

## 2019-05-08 NOTE — ED ADULT NURSE NOTE - OBJECTIVE STATEMENT
Received a 48 year old male with a chief complaint of dizziness. Patient with a PMHx of ESRD and HTN. Patient reports that he was seen in the ED of another facility prior to arrival to  ED. Patient awake, alert, and oriented. Verbally responsive and coherent. Patient denies chest pain or shortness of breath. Patient brought lab reports from prior facility. Patient given caloric juice water - and encouraged to consume.

## 2019-05-08 NOTE — ED PROVIDER NOTE - NSFOLLOWUPINSTRUCTIONS_ED_ALL_ED_FT
Weakness    Be sure to eat and drink regularly.  Follow up with your pmd and kidney doctor.  Return for increased weakness, fever, chest or abdominal pain, difficulty breathing, any other concerns.

## 2019-05-08 NOTE — ED PROVIDER NOTE - NSFOLLOWUPCLINICS_GEN_ALL_ED_FT
[facility]  Family Medicine  [facility]. [address]  [place], [place] [facility]  Phone: [phone]  Fax:   Follow Up Time:

## 2019-05-12 DIAGNOSIS — R53.81 OTHER MALAISE: ICD-10-CM

## 2019-05-12 DIAGNOSIS — R53.1 WEAKNESS: ICD-10-CM

## 2019-05-12 DIAGNOSIS — R42 DIZZINESS AND GIDDINESS: ICD-10-CM

## 2019-05-12 DIAGNOSIS — N18.6 END STAGE RENAL DISEASE: ICD-10-CM

## 2019-05-12 DIAGNOSIS — R68.83 CHILLS (WITHOUT FEVER): ICD-10-CM

## 2019-05-12 DIAGNOSIS — Z99.2 DEPENDENCE ON RENAL DIALYSIS: ICD-10-CM

## 2019-05-12 DIAGNOSIS — Z79.899 OTHER LONG TERM (CURRENT) DRUG THERAPY: ICD-10-CM

## 2019-05-12 DIAGNOSIS — I12.0 HYPERTENSIVE CHRONIC KIDNEY DISEASE WITH STAGE 5 CHRONIC KIDNEY DISEASE OR END STAGE RENAL DISEASE: ICD-10-CM

## 2019-05-13 ENCOUNTER — EMERGENCY (EMERGENCY)
Facility: HOSPITAL | Age: 49
LOS: 1 days | Discharge: HOME | End: 2019-05-13
Attending: EMERGENCY MEDICINE | Admitting: EMERGENCY MEDICINE
Payer: COMMERCIAL

## 2019-05-13 VITALS
OXYGEN SATURATION: 99 % | SYSTOLIC BLOOD PRESSURE: 141 MMHG | HEART RATE: 86 BPM | DIASTOLIC BLOOD PRESSURE: 85 MMHG | RESPIRATION RATE: 18 BRPM | TEMPERATURE: 98 F | WEIGHT: 162.04 LBS | HEIGHT: 67 IN

## 2019-05-13 DIAGNOSIS — I77.0 ARTERIOVENOUS FISTULA, ACQUIRED: Chronic | ICD-10-CM

## 2019-05-13 DIAGNOSIS — J06.9 ACUTE UPPER RESPIRATORY INFECTION, UNSPECIFIED: ICD-10-CM

## 2019-05-13 DIAGNOSIS — R05 COUGH: ICD-10-CM

## 2019-05-13 DIAGNOSIS — Z79.899 OTHER LONG TERM (CURRENT) DRUG THERAPY: ICD-10-CM

## 2019-05-13 PROCEDURE — 71046 X-RAY EXAM CHEST 2 VIEWS: CPT

## 2019-05-13 PROCEDURE — 71046 X-RAY EXAM CHEST 2 VIEWS: CPT | Mod: 26

## 2019-05-13 PROCEDURE — 99283 EMERGENCY DEPT VISIT LOW MDM: CPT

## 2019-05-14 VITALS
SYSTOLIC BLOOD PRESSURE: 135 MMHG | OXYGEN SATURATION: 97 % | HEART RATE: 85 BPM | DIASTOLIC BLOOD PRESSURE: 77 MMHG | RESPIRATION RATE: 16 BRPM | TEMPERATURE: 98 F

## 2019-05-14 NOTE — ED PROVIDER NOTE - OBJECTIVE STATEMENT
47 yo ESRD on dialysis T/Th/Sat s/p w complaints of chest congestion, cough, rhinorrhea for three days, no f/c, shortness of breath, chest pain, leg swelling, abd pain, states compliance w dialysis. Pt has not tried anything for symptoms, no other aggravating or relieving factors.

## 2019-05-14 NOTE — ED PROVIDER NOTE - NSFOLLOWUPINSTRUCTIONS_ED_ALL_ED_FT
Cold Symptoms    WHAT YOU NEED TO KNOW:    A cold is an infection caused by a virus. The infection causes your upper respiratory system to become inflamed. Common symptoms of a cold include sneezing, dry throat, a stuffy nose, headache, watery eyes, and a cough. Your cough may be dry, or you may cough up mucus. You may also have muscle aches, joint pain, and tiredness. Rarely, you may have a fever. Most colds go away without treatment.    DISCHARGE INSTRUCTIONS:    Return to the emergency department if:     You have increased tiredness and weakness.      You are unable to eat.       Your heart is beating much faster than usual for you.       You see white spots in the back of your throat and your neck is swollen and sore to the touch.       You see pinpoint or larger reddish-purple dots on your skin.     Contact your healthcare provider if:     You have a fever higher than 102°F (38.9°C).      You have new or worsening shortness of breath.       You have thick nasal drainage for more than 2 days.       Your symptoms do not improve or get worse within 5 days.       You have questions or concerns about your condition or care.    Medicines: The following medicines may be suggested by your healthcare provider to decrease your cold symptoms. These medicines are available without a doctor's order. Ask which medicines to take and when to take them. Follow directions.    NSAIDs or acetaminophen help to bring down a fever or decrease pain.      Decongestants help decrease nasal stuffiness.       Antihistamines help decrease sneezing and a runny nose.       Cough suppressants help decrease how much you cough.      Expectorants help loosen mucus so you can cough it up.      Take your medicine as directed. Contact your healthcare provider if you think your medicine is not helping or if you have side effects. Tell him of her if you are allergic to any medicine. Keep a list of the medicines, vitamins, and herbs you take. Include the amounts, and when and why you take them. Bring the list or the pill bottles to follow-up visits. Carry your medicine list with you in case of an emergency.    Symptom relief: The following may help relieve cold symptoms, such as a dry throat and congestion:     Gargle with mouthwash or warm salt water as directed.       Suck on throat lozenges or hard candy.       Use a cold or warm vaporizer or humidifier to ease your breathing.       Rest for at least 2 days and then as needed to decrease tiredness and weakness.       Use petroleum based jelly around your nostrils to decrease irritation from blowing your nose.     Drink liquids: Liquids will help thin and loosen thick mucus so you can cough it up. Liquids will also keep you hydrated. Ask your healthcare provider which liquids are best for you and how much to drink each day.    Prevent the spread of germs: You can spread your cold germs to others for at least 3 days after your symptoms start. Wash your hands often. Do not share items, such as eating utensils. Cover your nose and mouth when you cough or sneeze using the crook of your elbow instead of your hands. Throw used tissues in the garbage.    Do not smoke: Smoking may worsen your symptoms and increase the length of time you feel sick. Talk with your healthcare provider if you need help to stop smoking.    Follow up with your healthcare provider as directed: Write down your questions so you remember to ask them during your visits.        © Copyright [employer] 2019 All illustrations and images included in CareNotes are the copyrighted property of [employer] Inc[employer] or [employer].      back to top                      © Copyright [employer] 2019

## 2019-05-14 NOTE — ED PROVIDER NOTE - NSFOLLOWUPCLINICS_GEN_ALL_ED_FT
[facility]  Family Medicine  [facility] [place]. [address]  [place], [place] [facility]  Phone: [phone]  Fax:   Follow Up Time:

## 2019-05-14 NOTE — ED PROVIDER NOTE - CLINICAL SUMMARY MEDICAL DECISION MAKING FREE TEXT BOX
ESRD on dialysis T/Th/Sat s/p w complaints of chest congestion, cough, rhinorrhea for three days, no f/c, Well appearing, nad, nc/at, lung cta, heart reg, abd soft, nt, ext no gross deformity, no gross neuro deficits, CXR normal, continue supportive care, mucinex, tessalon perles on dc.

## 2019-05-27 ENCOUNTER — EMERGENCY (EMERGENCY)
Facility: HOSPITAL | Age: 49
LOS: 1 days | Discharge: HOME | End: 2019-05-27
Attending: EMERGENCY MEDICINE | Admitting: EMERGENCY MEDICINE
Payer: COMMERCIAL

## 2019-05-27 VITALS
DIASTOLIC BLOOD PRESSURE: 89 MMHG | OXYGEN SATURATION: 97 % | RESPIRATION RATE: 18 BRPM | HEART RATE: 60 BPM | TEMPERATURE: 98 F | SYSTOLIC BLOOD PRESSURE: 155 MMHG

## 2019-05-27 VITALS
SYSTOLIC BLOOD PRESSURE: 150 MMHG | OXYGEN SATURATION: 99 % | HEIGHT: 69 IN | WEIGHT: 214.29 LBS | HEART RATE: 72 BPM | TEMPERATURE: 98 F | DIASTOLIC BLOOD PRESSURE: 90 MMHG | RESPIRATION RATE: 18 BRPM

## 2019-05-27 DIAGNOSIS — I77.0 ARTERIOVENOUS FISTULA, ACQUIRED: Chronic | ICD-10-CM

## 2019-05-27 DIAGNOSIS — Z79.899 OTHER LONG TERM (CURRENT) DRUG THERAPY: ICD-10-CM

## 2019-05-27 DIAGNOSIS — M79.89 OTHER SPECIFIED SOFT TISSUE DISORDERS: ICD-10-CM

## 2019-05-27 DIAGNOSIS — R60.0 LOCALIZED EDEMA: ICD-10-CM

## 2019-05-27 PROCEDURE — 99284 EMERGENCY DEPT VISIT MOD MDM: CPT

## 2019-05-27 PROCEDURE — 93971 EXTREMITY STUDY: CPT

## 2019-05-27 PROCEDURE — 99284 EMERGENCY DEPT VISIT MOD MDM: CPT | Mod: 25

## 2019-05-27 PROCEDURE — 93971 EXTREMITY STUDY: CPT | Mod: 26,RT

## 2019-05-27 NOTE — ED PROVIDER NOTE - OBJECTIVE STATEMENT
pt c/o right lower leg swelling.  says the right leg swelling increased, he only noticed after a  told him his leg looked swollen.  he had no pain in the leg, no SOB/BLACK/orthopnea, hemoptysis, chest pain, fever or chills.  he says he is homeless and has nowhere to rest and elevate his legs.  hx ESRD on HD Tu/Th/Sat - last HD yesterday hasn't missed any doses.

## 2019-05-27 NOTE — ED PROVIDER NOTE - NSFOLLOWUPINSTRUCTIONS_ED_ALL_ED_FT
Rest and elevate your legs.  Follow up with your doctor in 1-2 days.   Continue your dialysis as scheduled.  _________________________________________________  LEG EDEMA - AfterCare(R) Instructions(ER/ED)     Leg Edema    WHAT YOU NEED TO KNOW:    Leg edema is swelling caused by fluid buildup. Your legs may swell if you sit or stand for long periods of time, are pregnant, or are injured. Swelling may also occur if you have heart failure or circulation problems. This means that your heart does not pump blood through your body as it should.    DISCHARGE INSTRUCTIONS:    Self-care:     Elevate your legs: Raise your legs above the level of your heart as often as you can. This will help decrease swelling and pain. Prop your legs on pillows or blankets to keep them elevated comfortably.      Wear pressure stockings: These tight stockings put pressure on your legs to promote blood flow and prevent blood clots. Wear the stockings during the day. Do not wear them while you sleep.      Apply heat: Heat helps decrease pain and swelling. Apply heat on the area for 20 to 30 minutes every 2 hours for as many days as directed.       Stay active: Do not stand or sit for long periods of time. Ask your healthcare provider about the best exercise plan for you.      Eat healthy foods: Healthy foods include fruits, vegetables, whole-grain breads, low-fat dairy products, beans, lean meats, and fish. Ask if you need to be on a special diet. Limit salt. Salt will make your body hold even more fluid.    Follow up with your healthcare provider as directed: Write down your questions so you remember to ask them during your visits.     Contact your healthcare provider if:     You have a fever or feel more tired than usual.      The veins in your legs look larger than usual. They may look full or bulging.      Your legs itch or feel heavy.      You have red or white areas or sores on your legs. The skin may also appear dimpled or have indentations.      You are gaining weight.      You have trouble moving your ankles.      The swelling does not go away, or other parts of your body swell.      You have questions or concerns about your condition or care.    Return to the emergency department if:     You cannot walk.      You feel faint or confused.       Your skin turns blue or gray.      Your leg feels warm, tender, and painful. It may be swollen and red.      You have chest pain or trouble breathing that is worse when you lie down.      You suddenly feel lightheaded and have trouble breathing.      You have new and sudden chest pain. You may have more pain when you take deep breaths or cough. You may also cough up blood.

## 2019-05-27 NOTE — ED ADULT NURSE NOTE - CHPI ED NUR SYMPTOMS NEG
no tingling/no weakness/no fever/no nausea/no decreased eating/drinking/no vomiting/no dizziness/no chills

## 2019-05-27 NOTE — ED ADULT NURSE NOTE - OBJECTIVE STATEMENT
Presents to ED for RLE edema.  Patient reports he is a dialysis patient and has been experiencing B/L LE edema.  Patient presents today stating his right leg appears "more swollen than before."  Denies any recent travel, SOB, palpitations, CP, abd pain, fever or chills.

## 2019-05-27 NOTE — ED ADULT NURSE NOTE - NSIMPLEMENTINTERV_GEN_ALL_ED
Implemented All Universal Safety Interventions:  [place] to call system. Call bell, personal items and telephone within reach. Instruct patient to call for assistance. Room bathroom lighting operational. Non-slip footwear when patient is off stretcher. Physically safe environment: no spills, clutter or unnecessary equipment. Stretcher in lowest position, wheels locked, appropriate side rails in place.

## 2019-05-27 NOTE — ED PROVIDER NOTE - CLINICAL SUMMARY MEDICAL DECISION MAKING FREE TEXT BOX
patient with hx of chronic leg edema, reporting increased right leg edema only after a [employer] worker noticed it - has no leg pain, no chest discomfort or any respiratory symptoms.  no cellulitis of legs.  feet well perfused.  US neg for DVT. homelessness likely contributing because has nowhere to rest and elevate legs.  possible motive for secondary gain.

## 2019-05-27 NOTE — ED PROVIDER NOTE - ATTENDING CONTRIBUTION TO CARE
Attending Statement: I have personally performed a face to face diagnostic evaluation on this patient. I have reviewed the ACP note and agree with the history, exam and plan of care, except as noted.     Attending Contribution to Care:  48M with a hx of chronic leg edema, reporting increased right leg edema only after a subway worker noticed it - has no leg pain, no chest discomfort or any respiratory symptoms.  no cellulitis of legs.  feet well perfused.  US neg for DVT. homelessness likely contributing because has nowhere to rest and elevate legs. stable for dc.

## 2019-05-27 NOTE — ED PROVIDER NOTE - PHYSICAL EXAMINATION
CONSTITUTIONAL: WD,WN. NAD.    SKIN: Normal color and turgor. No rash.    HEAD: NC/AT.  EYES: Conjunctiva clear. EOMI. PERRL.    ENT: Airway patent, OP without erythema, tonsillar swelling or exudate; uvula midline without swelling. Nasal mucosa clear, no rhinorrhea.   RESPIRATORY:  Breathing non-labored. No retractions or accessory muscle use.  Lungs CTA bilat.  CARDIOVASCULAR:  RRR, S1S2. No M/R/G.      GI:  Abdomen soft, nontender.    MSK: Neck supple with painless ROM.  Bilateral LE edema up to knees, right > left.  No calf tenderness. No joint swelling or ROM limitation.  NEURO: Alert and oriented; CN II-XII grossly intact. Speech clear. 5/5 strength in all extremities.  Normal balance and gait.

## 2019-07-09 NOTE — ED PROVIDER NOTE - PRO INTERPRETER NEED 2
Spoke with pt still with some left hip pain but improving.  No fevers, no chills, no suprapubic tenderness.  Informed of radiology read and to follow up as planned return to ER for any new or worsening symptoms English

## 2019-08-05 ENCOUNTER — INPATIENT (INPATIENT)
Facility: HOSPITAL | Age: 49
LOS: 1 days | Discharge: HOME | DRG: 252 | End: 2019-08-07
Attending: SURGERY | Admitting: SURGERY
Payer: COMMERCIAL

## 2019-08-05 VITALS
RESPIRATION RATE: 16 BRPM | OXYGEN SATURATION: 100 % | SYSTOLIC BLOOD PRESSURE: 172 MMHG | DIASTOLIC BLOOD PRESSURE: 90 MMHG | TEMPERATURE: 98 F | HEIGHT: 69 IN | HEART RATE: 70 BPM

## 2019-08-05 DIAGNOSIS — I77.0 ARTERIOVENOUS FISTULA, ACQUIRED: Chronic | ICD-10-CM

## 2019-08-05 LAB
ALBUMIN SERPL ELPH-MCNC: 4 G/DL — SIGNIFICANT CHANGE UP (ref 3.3–5)
ALP SERPL-CCNC: 84 U/L — SIGNIFICANT CHANGE UP (ref 40–120)
ALT FLD-CCNC: 16 U/L — SIGNIFICANT CHANGE UP (ref 10–45)
ANION GAP SERPL CALC-SCNC: 16 MMOL/L — SIGNIFICANT CHANGE UP (ref 5–17)
APTT BLD: 32.4 SEC — SIGNIFICANT CHANGE UP (ref 27.5–36.3)
AST SERPL-CCNC: 15 U/L — SIGNIFICANT CHANGE UP (ref 10–40)
BASOPHILS # BLD AUTO: 0.01 K/UL — SIGNIFICANT CHANGE UP (ref 0–0.2)
BASOPHILS NFR BLD AUTO: 0.2 % — SIGNIFICANT CHANGE UP (ref 0–2)
BILIRUB SERPL-MCNC: 0.5 MG/DL — SIGNIFICANT CHANGE UP (ref 0.2–1.2)
BLD GP AB SCN SERPL QL: NEGATIVE — SIGNIFICANT CHANGE UP
BUN SERPL-MCNC: 99 MG/DL — HIGH (ref 7–23)
CALCIUM SERPL-MCNC: 9.1 MG/DL — SIGNIFICANT CHANGE UP (ref 8.4–10.5)
CHLORIDE SERPL-SCNC: 105 MMOL/L — SIGNIFICANT CHANGE UP (ref 96–108)
CO2 SERPL-SCNC: 17 MMOL/L — LOW (ref 22–31)
CREAT SERPL-MCNC: 11.47 MG/DL — HIGH (ref 0.5–1.3)
EOSINOPHIL # BLD AUTO: 0.05 K/UL — SIGNIFICANT CHANGE UP (ref 0–0.5)
EOSINOPHIL NFR BLD AUTO: 0.9 % — SIGNIFICANT CHANGE UP (ref 0–6)
GLUCOSE BLDC GLUCOMTR-MCNC: 100 MG/DL — HIGH (ref 70–99)
GLUCOSE SERPL-MCNC: 58 MG/DL — LOW (ref 70–99)
HBV SURFACE AG SER-ACNC: SIGNIFICANT CHANGE UP
HCT VFR BLD CALC: 36.3 % — LOW (ref 39–50)
HCV AB S/CO SERPL IA: 0.1 S/CO — SIGNIFICANT CHANGE UP
HCV AB SERPL-IMP: SIGNIFICANT CHANGE UP
HGB BLD-MCNC: 11.6 G/DL — LOW (ref 13–17)
IMM GRANULOCYTES NFR BLD AUTO: 0.3 % — SIGNIFICANT CHANGE UP (ref 0–1.5)
INR BLD: 1.15 — SIGNIFICANT CHANGE UP (ref 0.88–1.16)
LYMPHOCYTES # BLD AUTO: 0.62 K/UL — LOW (ref 1–3.3)
LYMPHOCYTES # BLD AUTO: 10.6 % — LOW (ref 13–44)
MCHC RBC-ENTMCNC: 28.2 PG — SIGNIFICANT CHANGE UP (ref 27–34)
MCHC RBC-ENTMCNC: 32 GM/DL — SIGNIFICANT CHANGE UP (ref 32–36)
MCV RBC AUTO: 88.3 FL — SIGNIFICANT CHANGE UP (ref 80–100)
MONOCYTES # BLD AUTO: 0.67 K/UL — SIGNIFICANT CHANGE UP (ref 0–0.9)
MONOCYTES NFR BLD AUTO: 11.4 % — SIGNIFICANT CHANGE UP (ref 2–14)
NEUTROPHILS # BLD AUTO: 4.49 K/UL — SIGNIFICANT CHANGE UP (ref 1.8–7.4)
NEUTROPHILS NFR BLD AUTO: 76.6 % — SIGNIFICANT CHANGE UP (ref 43–77)
NRBC # BLD: 0 /100 WBCS — SIGNIFICANT CHANGE UP (ref 0–0)
PLATELET # BLD AUTO: 144 K/UL — LOW (ref 150–400)
POTASSIUM SERPL-MCNC: 6.4 MMOL/L — CRITICAL HIGH (ref 3.5–5.3)
POTASSIUM SERPL-SCNC: 6.4 MMOL/L — CRITICAL HIGH (ref 3.5–5.3)
PROT SERPL-MCNC: 8.7 G/DL — HIGH (ref 6–8.3)
PROTHROM AB SERPL-ACNC: 13 SEC — HIGH (ref 10–12.9)
RBC # BLD: 4.11 M/UL — LOW (ref 4.2–5.8)
RBC # FLD: 14.6 % — HIGH (ref 10.3–14.5)
RH IG SCN BLD-IMP: POSITIVE — SIGNIFICANT CHANGE UP
SODIUM SERPL-SCNC: 138 MMOL/L — SIGNIFICANT CHANGE UP (ref 135–145)
WBC # BLD: 5.86 K/UL — SIGNIFICANT CHANGE UP (ref 3.8–10.5)
WBC # FLD AUTO: 5.86 K/UL — SIGNIFICANT CHANGE UP (ref 3.8–10.5)

## 2019-08-05 PROCEDURE — 93010 ELECTROCARDIOGRAM REPORT: CPT

## 2019-08-05 PROCEDURE — 71045 X-RAY EXAM CHEST 1 VIEW: CPT | Mod: 26

## 2019-08-05 PROCEDURE — 99291 CRITICAL CARE FIRST HOUR: CPT

## 2019-08-05 PROCEDURE — 76937 US GUIDE VASCULAR ACCESS: CPT | Mod: 26

## 2019-08-05 PROCEDURE — 36556 INSERT NON-TUNNEL CV CATH: CPT | Mod: RT

## 2019-08-05 RX ORDER — CALCIUM GLUCONATE 100 MG/ML
1 VIAL (ML) INTRAVENOUS ONCE
Refills: 0 | Status: COMPLETED | OUTPATIENT
Start: 2019-08-05 | End: 2019-08-05

## 2019-08-05 RX ORDER — INSULIN HUMAN 100 [IU]/ML
5 INJECTION, SOLUTION SUBCUTANEOUS ONCE
Refills: 0 | Status: COMPLETED | OUTPATIENT
Start: 2019-08-05 | End: 2019-08-05

## 2019-08-05 RX ORDER — HEPARIN SODIUM 5000 [USP'U]/ML
5000 INJECTION INTRAVENOUS; SUBCUTANEOUS EVERY 8 HOURS
Refills: 0 | Status: DISCONTINUED | OUTPATIENT
Start: 2019-08-05 | End: 2019-08-06

## 2019-08-05 RX ORDER — DOCUSATE SODIUM 100 MG
100 CAPSULE ORAL THREE TIMES A DAY
Refills: 0 | Status: DISCONTINUED | OUTPATIENT
Start: 2019-08-05 | End: 2019-08-06

## 2019-08-05 RX ORDER — DEXTROSE 50 % IN WATER 50 %
50 SYRINGE (ML) INTRAVENOUS ONCE
Refills: 0 | Status: COMPLETED | OUTPATIENT
Start: 2019-08-05 | End: 2019-08-05

## 2019-08-05 RX ORDER — INSULIN HUMAN 100 [IU]/ML
5 INJECTION, SOLUTION SUBCUTANEOUS ONCE
Refills: 0 | Status: DISCONTINUED | OUTPATIENT
Start: 2019-08-05 | End: 2019-08-05

## 2019-08-05 RX ORDER — SODIUM POLYSTYRENE SULFONATE 4.1 MEQ/G
30 POWDER, FOR SUSPENSION ORAL ONCE
Refills: 0 | Status: COMPLETED | OUTPATIENT
Start: 2019-08-05 | End: 2019-08-05

## 2019-08-05 RX ORDER — DOXERCALCIFEROL 2.5 UG/1
4 CAPSULE ORAL ONCE
Refills: 0 | Status: COMPLETED | OUTPATIENT
Start: 2019-08-05 | End: 2019-08-05

## 2019-08-05 RX ORDER — ACETAMINOPHEN 500 MG
650 TABLET ORAL EVERY 6 HOURS
Refills: 0 | Status: DISCONTINUED | OUTPATIENT
Start: 2019-08-05 | End: 2019-08-06

## 2019-08-05 RX ORDER — ALBUTEROL 90 UG/1
2.5 AEROSOL, METERED ORAL ONCE
Refills: 0 | Status: COMPLETED | OUTPATIENT
Start: 2019-08-05 | End: 2019-08-05

## 2019-08-05 RX ORDER — ACETAMINOPHEN 500 MG
650 TABLET ORAL ONCE
Refills: 0 | Status: COMPLETED | OUTPATIENT
Start: 2019-08-05 | End: 2019-08-05

## 2019-08-05 RX ADMIN — Medication 50 MILLILITER(S): at 18:18

## 2019-08-05 RX ADMIN — ALBUTEROL 2.5 MILLIGRAM(S): 90 AEROSOL, METERED ORAL at 18:07

## 2019-08-05 RX ADMIN — Medication 650 MILLIGRAM(S): at 17:30

## 2019-08-05 RX ADMIN — SODIUM POLYSTYRENE SULFONATE 30 GRAM(S): 4.1 POWDER, FOR SUSPENSION ORAL at 18:07

## 2019-08-05 RX ADMIN — Medication 50 MILLILITER(S): at 18:11

## 2019-08-05 RX ADMIN — DOXERCALCIFEROL 4 MICROGRAM(S): 2.5 CAPSULE ORAL at 22:06

## 2019-08-05 RX ADMIN — Medication 200 GRAM(S): at 18:15

## 2019-08-05 NOTE — ED ADULT NURSE NOTE - OBJECTIVE STATEMENT
c/c went to hemodialysis on Saturday, HD was not performed due to inability to access the fistula, seen by MD mojica with clot in the fistula. and sent to ER. L arm is gradually swollen x 3 days, pt reports some throbbing pain at the fistula site, otherwise deneis any complains. HD x 3 years, produces urine
Pt. c/o on/off "tight" midsternal CP w/ SOB and weakness since 6 AM. Pain worsens when walking. Pt. has Hx of asthma and states SOB was improved after using inhaler. Denies fever, chills. EKG performed in ED, pt. placed on tele monitor. R FA 18g IV placed and 1L NS given by EMS.

## 2019-08-05 NOTE — ED PROVIDER NOTE - OBJECTIVE STATEMENT
49 yo M with pmh of ESRD on HD (T,Th,S, last dialyzed Thurs because was unable to access fistula on Sat) here for clot in fistula. Pt saw Dr. Cardona today who did an ultrasound and visualized a clot in his fistula. Pt reports swelling to L arm that started today. Associated with some throbbing pain. Denies fever, chills, cp, sob.

## 2019-08-05 NOTE — ED ADULT NURSE NOTE - CHPI ED NUR SYMPTOMS NEG
no decreased eating/drinking/no fever/no chills/no dizziness/no vomiting/no weakness/no tingling/no nausea

## 2019-08-05 NOTE — ED PROVIDER NOTE - CRITICAL CARE INDICATION, MLM
patient was critically ill... Patient was critically ill with a high probability of imminent or life threatening deterioration.  hyperkalemia requiring iv medications for treatment

## 2019-08-05 NOTE — H&P ADULT - HISTORY OF PRESENT ILLNESS
Mr. Soraes is a 49 yo M with LUE AVF creation (3 years ago) 2/2 history of ESRD (since 3 years ago) on HD Tu/Thurs/Sa -- last successful HD session was Thursday, August 1st. Patient reports attempted hemodialysis on Saturday, which was aborted due to lack of blood flow after cannulation. Patient was seen in clinic by Dr. Adrian today; in office ultrasound was reportedly positive for clot in the LUE AVF and patient was told to come to ED.     In the ED, patient is afebrile and hemodynamically stable. WBC is 5.8, hgb 11.6, , K 6.4, Cr 11.4.    PMH: ESRD (since 3 years ago) on HD Tu/Thurs/Sa  PSx: LUE AVF creation (3 years ago)   Allergies: NKDA  Meds: calcium acetate, magnesium supplement, multivitamin  FH: patient denies FH of cancers or IBD  SH: denies tobacco, ETOH, or drug use

## 2019-08-05 NOTE — H&P ADULT - ATTENDING COMMENTS
Agree with above    Has thrombosed AVF in LUE. Discussed options, including endo approach, or possible open thrombectomy.    Patient agreeable to proceed.    Will perform once been dialyzed, and K is in normal range.

## 2019-08-05 NOTE — PROCEDURE NOTE - NSPROCDETAILS_GEN_ALL_CORE
blood seen on insertion/dressing applied/flushes easily/secured in place
guidewire recovered/lumen(s) aspirated and flushed/sterile dressing applied/sterile technique, catheter placed/ultrasound guidance

## 2019-08-05 NOTE — CONSULT NOTE ADULT - ASSESSMENT
Patient is a 48 y o Black male with pmh of ESRD on HD TTS via  LUE AVF x 3 years, HTN, who came to ED secondary to non functioning AVF.   Nephrology consult is placed in regards to ESRD on HD.   Patient does not know why he ended being on HD.   HE does not know dry weight.   Upon checking labs, pt was found to have K @ 6.4 without EKG changes.     ESRD on HD  TTS via LUE AVF- which now non functioning  last HD completed fully on 8/1  plan to complete hD today over 3 hrs with 2 K bath via temporary HD access  access to be revised by vascular  renal diet  next HD on 8/6      hyperkalemia  likely 2nd to poor dialysis   s/p insulin, glucose, kayexalate, calcium gluconate  plan for HD     acidosis  likely 2nd to missed HD sessions  plan to HD pt today      HTN  on HD with UF  will inquire about his meds     renal bone disease  on hectorol with HD  will check with outpt HD for pth, vit D level  please check calcium and phos

## 2019-08-05 NOTE — H&P ADULT - NSHPPHYSICALEXAM_GEN_ALL_CORE
Gen: NAD, resting comfortably in bed  C/V: NSR  Pulm: Nonlabored breathing, no respiratory distress  Abd: soft, NT/ND.   Extrem: LUE AVF without palpable thrill.

## 2019-08-05 NOTE — ED PROVIDER NOTE - PHYSICAL EXAMINATION
CONSTITUTIONAL: Well-appearing; well-nourished; in no apparent distress.   HEAD: Normocephalic; atraumatic.   EYES: PERRL; EOM intact; conjunctiva and sclera clear  ENT: normal nose; no rhinorrhea; normal pharynx with no erythema or lesions.   NECK: Supple; non-tender; no LAD  CARDIOVASCULAR: Normal S1, S2; no murmurs, rubs, or gallops. Regular rate and rhythm.   RESPIRATORY: Breathing easily; breath sounds clear and equal bilaterally; no wheezes, rhonchi, or rales.  GI: Soft; non-distended; non-tender; no palpable organomegaly.   MSK: FROM at all extremities, normal tone   EXT: No cyanosis or edema; N/V intact  SKIN: Normal for age and race; warm; dry; good turgor; no apparent lesions or rash.   NEURO: A & O x 3; face symmetric; grossly unremarkable.   PSYCHOLOGICAL: The patient’s mood and manner are appropriate. CONSTITUTIONAL: Well-appearing; well-nourished; in no apparent distress.   HEAD: Normocephalic; atraumatic.   EYES: PERRL; EOM intact; conjunctiva and sclera clear  ENT: normal nose; no rhinorrhea; normal pharynx with no erythema or lesions.   NECK: Supple; non-tender; no LAD  CARDIOVASCULAR: Normal S1, S2; no murmurs, rubs, or gallops. Regular rate and rhythm.   RESPIRATORY: Breathing easily; breath sounds clear and equal bilaterally; no wheezes, rhonchi, or rales.  GI: Soft; non-distended; non-tender; no palpable organomegaly.   MSK: FROM at all extremities, normal tone   EXT: LUE +swelling to upper arm with warmth, no erythema, no thrill felt  SKIN: Normal for age and race; warm; dry; good turgor; no apparent lesions or rash.   NEURO: A & O x 3; face symmetric; grossly unremarkable.   PSYCHOLOGICAL: The patient’s mood and manner are appropriate.

## 2019-08-05 NOTE — ED ADULT NURSE NOTE - OTHER COMPLAINTS
pt reports swelling and clot to L fistula. unable to receive dialysis on sat.
pt reports swelling and clot to L fistula. unable to receive dialysis on sat.

## 2019-08-05 NOTE — PROGRESS NOTE ADULT - SUBJECTIVE AND OBJECTIVE BOX
Pre-op Diagnosis: Occluded AV fistula  Procedure: Fistulogram  Surgeon: Dr. Aldridge    Consent in chart                          11.6   5.86  )-----------( 144      ( 05 Aug 2019 17:05 )             36.3     08-05    140  |  106  |  100<H>  ----------------------------<  118<H>  5.4<H>   |  18<L>  |  11.44<H>    Ca    9.1      05 Aug 2019 18:48    TPro  8.7<H>  /  Alb  4.0  /  TBili  0.5  /  DBili  x   /  AST  15  /  ALT  16  /  AlkPhos  84  08-05    PT/INR - ( 05 Aug 2019 17:05 )   PT: 13.0 sec;   INR: 1.15     PTT - ( 05 Aug 2019 17:05 )  PTT:32.4 sec      Type & Screen: A+ AB neg  CXR: Clear lungs  EKG: NSR      A/P: 48yMale planned for above procedure  1. NPO past midnight, except medications  2. Home medication  3. [ x] Blood on hold, Units: 2u PRBC  4. F/u 2am labs

## 2019-08-05 NOTE — ED ADULT NURSE NOTE - NSIMPLEMENTINTERV_GEN_ALL_ED
Implemented All Universal Safety Interventions:  [place] to call system. Call bell, personal items and telephone within reach. Instruct patient to call for assistance. Room bathroom lighting operational. Non-slip footwear when patient is off stretcher. Physically safe environment: no spills, clutter or unnecessary equipment. Stretcher in lowest position, wheels locked, appropriate side rails in place.
Implemented All Universal Safety Interventions:  [place] to call system. Call bell, personal items and telephone within reach. Instruct patient to call for assistance. Room bathroom lighting operational. Non-slip footwear when patient is off stretcher. Physically safe environment: no spills, clutter or unnecessary equipment. Stretcher in lowest position, wheels locked, appropriate side rails in place.

## 2019-08-05 NOTE — ED PROVIDER NOTE - ATTENDING CONTRIBUTION TO CARE
here with non functioning fistula.  unable to obtain dialysis.  no symptoms, not volume overloaded but labs with hyperkalemia requiring treatment.  iv insulin/ glucose/ calcium given.  vascular consulted.  arrangements made for alternate access and dialysis.  admit

## 2019-08-05 NOTE — ED ADULT NURSE NOTE - CHIEF COMPLAINT
The patient is a 48y Male complaining of
The patient is a 48y Male complaining of medical evaluation.

## 2019-08-05 NOTE — CONSULT NOTE ADULT - SUBJECTIVE AND OBJECTIVE BOX
Renal consult  Patient is a 48 y o Black male with pmh of ESRD on HD TTS via  LUE AVF x 3 years, HTN, who came to ED secondary to non functioning AVF.   Patient does not know why he ended being on HD.   HE does not know dry weight.   Upon checking labs, pt was found to have K @ 6.4 without EKG changes.   HE has received insulin with glucose, calcium gluconate, kayexalate.   Patient states last session was fully completed on 8/1.   HE denies any chest Pain, SOB.   HE reports LUE swelling.   Per dialysis center, pt was instructed to go to [facility] to have access revised, however, he didn't.         PMH: ESRD (since 3 years ago) on HD Tu/Thurs/Sa  PSx: LUE AVF creation (3 years ago)   Allergies: NKDA  Meds: calcium acetate, magnesium supplement, multivitamin  FH: patient denies FH of cancers or IBD  SH: denies tobacco, ETOH, or drug use (05 Aug 2019 17:57)      PAST MEDICAL & SURGICAL HISTORY:  ESRD (end stage renal disease) on dialysis  AV fistula      Allergies    No Known Allergies    Intolerances        FAMILY HISTORY: denies any FH of ESRD       SOCIAL HISTORY:    MEDICATIONS  (STANDING):  insulin regular  human recombinant. 5 Unit(s) IV Push once    MEDICATIONS  (PRN):      REVIEW OF SYSTEMS:    CONSTITUTIONAL: No fever or chills, No fatigue or tiredness.  EYES: No blurred or double vision.  ENT: No recent URI or sore throat  RESPIRATORY: No shortness of breath, cough, hemoptysis  CARDIOVASCULAR: No Chest pain or shortness of breath  GASTROINTESTINAL: NO abdominal or flank pain, No nausea or vomiting, No diarrhea  GENITOURINARY: No dysuria or urinary burning, No difficulty passing urine, No hematuria  NEUROLOGICAL: No headaches or blurred vision  SKIN: No skin rashes   MUSCULOSKELETAL: LUE swelling         PHYSICAL EXAM: /90, HR 70, RR16, osat 100 % ra  GENERAL: NAD,   HEAD:  Atraumatic, Normocephalic,   EYES: Bilateral conjunctiva and sclera normal,  Oral cavity: Oral mucosa dry and pink  NECK: Neck supple, No JVD  CHEST/LUNG: Clear to auscultation bilaterally; No wheeze, no rales, no crepitations  HEART: Regular rate and rhythm. KADEEM II/VI at LPSB, No gallop, no rub   ABDOMEN: Soft, Nontender, BS+nt, No flank tenderness.   EXTREMITIES: LUE arm swelling, AVF no bruit, no thrill   Neurology: AAOx3, no focal neurological deficit  SKIN: chronic skin changes       CAPILLARY BLOOD GLUCOSE      POCT Blood Glucose.: 53 mg/dL (05 Aug 2019 18:10)      I&O's Summary        LABS:                                                   08-05-19 @ 17:05    138  |  105  |  99<H>  ----------------------------<  58<L>  6.4<HH>   |  17<L>  |  11.47<H>    Ca    9.1      05 Aug 2019 17:05    TPro  8.7<H>  /  Alb  4.0  /  TBili  0.5  /  DBili  x   /  AST  15  /  ALT  16  /  AlkPhos  84  08-05                          11.6   5.86  )-----------( 144      ( 05 Aug 2019 17:05 )             36.3     CBC Full  -  ( 05 Aug 2019 17:05 )  WBC Count : 5.86 K/uL  Hemoglobin : 11.6 g/dL  Hematocrit : 36.3 %  Platelet Count - Automated : 144 K/uL  Mean Cell Volume : 88.3 fl        PT/INR - ( 05 Aug 2019 17:05 )   PT: 13.0 sec;   INR: 1.15          PTT - ( 05 Aug 2019 17:05 )  PTT:32.4 sec          RADIOLOGY & ADDITIONAL TESTS:    EKG/Telemetry: Reviewed Renal consult  Patient is a 48 y o Black male with pmh of ESRD on HD TTS via  LUE AVF x 3 years, HTN, who came to ED secondary to non functioning AVF.   Patient does not know why he ended being on HD.   HE does not know dry weight.   Upon checking labs, pt was found to have K @ 6.4 without EKG changes.   HE has received insulin with glucose, calcium gluconate, kayexalate.   Patient states last session was fully completed on 8/1.   HE denies any chest Pain, SOB.   HE reports LUE swelling.   Per dialysis center, pt was instructed to go to [facility] to have access revised, however, he didn't.         PMH: ESRD (since 3 years ago) on HD Tu/Thurs/Sa  PSx: LUE AVF creation (3 years ago)   Allergies: NKDA  Meds: calcium acetate, magnesium supplement, multivitamin  FH: patient denies FH of cancers or IBD  SH: denies tobacco, ETOH, or drug use (05 Aug 2019 17:57)      PAST MEDICAL & SURGICAL HISTORY:  ESRD (end stage renal disease) on dialysis  AV fistula      Allergies    No Known Allergies    Intolerances        FAMILY HISTORY: denies any FH of ESRD       SOCIAL HISTORY:    MEDICATIONS  (STANDING):  insulin regular  human recombinant. 5 Unit(s) IV Push once    MEDICATIONS  (PRN):      REVIEW OF SYSTEMS:    CONSTITUTIONAL: No fever or chills, No fatigue or tiredness.  EYES: No blurred or double vision.  ENT: No recent URI or sore throat  RESPIRATORY: No shortness of breath, cough, hemoptysis  CARDIOVASCULAR: No Chest pain or shortness of breath  GASTROINTESTINAL: NO abdominal or flank pain, No nausea or vomiting, No diarrhea  GENITOURINARY: No dysuria or urinary burning, No difficulty passing urine, No hematuria  NEUROLOGICAL: No headaches or blurred vision  SKIN: No skin rashes   MUSCULOSKELETAL: LUE swelling         PHYSICAL EXAM: /90, HR 70, RR16, osat 100 % ra  GENERAL: NAD,   HEAD:  Atraumatic, Normocephalic,   EYES: Bilateral conjunctiva and sclera normal,  Oral cavity: Oral mucosa dry and pink  NECK: Neck supple, No JVD  CHEST/LUNG: Clear to auscultation bilaterally; No wheeze, no rales, no crepitations  HEART: Regular rate and rhythm. KADEEM II/VI at LPSB, No gallop, no rub   ABDOMEN: Soft, Nontender, BS+nt, No flank tenderness.   EXTREMITIES: LUE arm swelling, AVF no bruit, no thrill, BLT LE +1 pitting edema   Neurology: AAOx3, no focal neurological deficit  SKIN: chronic skin changes, associated with excoriation and hyperpigmentation        CAPILLARY BLOOD GLUCOSE      POCT Blood Glucose.: 53 mg/dL (05 Aug 2019 18:10)      I&O's Summary        LABS:                                                   08-05-19 @ 17:05    138  |  105  |  99<H>  ----------------------------<  58<L>  6.4<HH>   |  17<L>  |  11.47<H>    Ca    9.1      05 Aug 2019 17:05    TPro  8.7<H>  /  Alb  4.0  /  TBili  0.5  /  DBili  x   /  AST  15  /  ALT  16  /  AlkPhos  84  08-05                          11.6   5.86  )-----------( 144      ( 05 Aug 2019 17:05 )             36.3     CBC Full  -  ( 05 Aug 2019 17:05 )  WBC Count : 5.86 K/uL  Hemoglobin : 11.6 g/dL  Hematocrit : 36.3 %  Platelet Count - Automated : 144 K/uL  Mean Cell Volume : 88.3 fl        PT/INR - ( 05 Aug 2019 17:05 )   PT: 13.0 sec;   INR: 1.15          PTT - ( 05 Aug 2019 17:05 )  PTT:32.4 sec          RADIOLOGY & ADDITIONAL TESTS:    EKG/Telemetry: Reviewed

## 2019-08-05 NOTE — H&P ADULT - ASSESSMENT
Mr. Soares is a 47 yo M presenting with unsuccessful hemodialysis secondary to LUE AVF clot.     - Admit to Vascular surgery - regional level of care  - Preoperative workup  - Added on to OR for fistulogram, possible thrombectomy  - Plan discussed with chief resident. Mr. Soares is a 47 yo M presenting with unsuccessful hemodialysis secondary to LUE AVF clot.     - Admit to Vascular surgery - regional level of care  - Preoperative workup  - Added on to OR for fistulogram, possible thrombectomy  - RIJ temporary HD catheter placed. HD per nephrology fellow today.  - Plan discussed with chief resident.

## 2019-08-05 NOTE — ED PROVIDER NOTE - CLINICAL SUMMARY MEDICAL DECISION MAKING FREE TEXT BOX
47 yo M with pmh of ESRD on HD (T,Th,S, last dialyzed Thurs because was unable to access fistula on Sat) here for clot in fistula. Pt saw Dr. Cardona today who did an ultrasound and visualized a clot in his fistula. Pt reports swelling to L arm that started today. Associated with some throbbing pain. Denies fever, chills, cp, sob. 47 yo M with pmh of ESRD on HD (T,Th,S, last dialyzed Thurs because was unable to access fistula on Sat) here for clot in fistula. Pt saw Dr. Cardona today who did an ultrasound and visualized a clot in his fistula. Pt reports swelling to L arm that started today. Associated with some throbbing pain. Denies fever, chills, cp, sob. LUE +swelling to upper arm with warmth, no erythema, no thrill felt. Labs, vascular consult

## 2019-08-06 ENCOUNTER — RESULT REVIEW (OUTPATIENT)
Age: 49
End: 2019-08-06

## 2019-08-06 LAB
ANION GAP SERPL CALC-SCNC: 16 MMOL/L — SIGNIFICANT CHANGE UP (ref 5–17)
ANION GAP SERPL CALC-SCNC: 16 MMOL/L — SIGNIFICANT CHANGE UP (ref 5–17)
BASE EXCESS BLDA CALC-SCNC: -4 MMOL/L — LOW (ref -2–3)
BUN SERPL-MCNC: 62 MG/DL — HIGH (ref 7–23)
BUN SERPL-MCNC: 68 MG/DL — HIGH (ref 7–23)
CA-I BLDA-SCNC: 1.11 MMOL/L — LOW (ref 1.12–1.3)
CALCIUM SERPL-MCNC: 8.3 MG/DL — LOW (ref 8.4–10.5)
CALCIUM SERPL-MCNC: 8.9 MG/DL — SIGNIFICANT CHANGE UP (ref 8.4–10.5)
CHLORIDE SERPL-SCNC: 101 MMOL/L — SIGNIFICANT CHANGE UP (ref 96–108)
CHLORIDE SERPL-SCNC: 104 MMOL/L — SIGNIFICANT CHANGE UP (ref 96–108)
CO2 SERPL-SCNC: 19 MMOL/L — LOW (ref 22–31)
CO2 SERPL-SCNC: 22 MMOL/L — SIGNIFICANT CHANGE UP (ref 22–31)
COHGB MFR BLDA: 0.9 % — SIGNIFICANT CHANGE UP
CREAT SERPL-MCNC: 7.84 MG/DL — HIGH (ref 0.5–1.3)
CREAT SERPL-MCNC: 9.31 MG/DL — HIGH (ref 0.5–1.3)
GLUCOSE BLDC GLUCOMTR-MCNC: 93 MG/DL — SIGNIFICANT CHANGE UP (ref 70–99)
GLUCOSE SERPL-MCNC: 113 MG/DL — HIGH (ref 70–99)
GLUCOSE SERPL-MCNC: 90 MG/DL — SIGNIFICANT CHANGE UP (ref 70–99)
HAV IGM SER-ACNC: SIGNIFICANT CHANGE UP
HBV CORE IGM SER-ACNC: SIGNIFICANT CHANGE UP
HBV SURFACE AB SER-ACNC: SIGNIFICANT CHANGE UP
HBV SURFACE AG SER-ACNC: SIGNIFICANT CHANGE UP
HCO3 BLDA-SCNC: 23 MMOL/L — SIGNIFICANT CHANGE UP (ref 21–28)
HCT VFR BLD CALC: 33.9 % — LOW (ref 39–50)
HCT VFR BLD CALC: 35.5 % — LOW (ref 39–50)
HCT VFR BLD CALC: 35.8 % — LOW (ref 39–50)
HCV AB S/CO SERPL IA: 0.11 S/CO — SIGNIFICANT CHANGE UP
HCV AB SERPL-IMP: SIGNIFICANT CHANGE UP
HGB BLD-MCNC: 11.4 G/DL — LOW (ref 13–17)
HGB BLD-MCNC: 11.5 G/DL — LOW (ref 13–17)
HGB BLD-MCNC: 11.7 G/DL — LOW (ref 13–17)
HGB BLDA-MCNC: 11.9 G/DL — LOW (ref 13–17)
HIV 1+2 AB+HIV1 P24 AG SERPL QL IA: SIGNIFICANT CHANGE UP
MAGNESIUM SERPL-MCNC: 1.9 MG/DL — SIGNIFICANT CHANGE UP (ref 1.6–2.6)
MAGNESIUM SERPL-MCNC: 1.9 MG/DL — SIGNIFICANT CHANGE UP (ref 1.6–2.6)
MCHC RBC-ENTMCNC: 28.1 PG — SIGNIFICANT CHANGE UP (ref 27–34)
MCHC RBC-ENTMCNC: 28.4 PG — SIGNIFICANT CHANGE UP (ref 27–34)
MCHC RBC-ENTMCNC: 29 PG — SIGNIFICANT CHANGE UP (ref 27–34)
MCHC RBC-ENTMCNC: 32.1 GM/DL — SIGNIFICANT CHANGE UP (ref 32–36)
MCHC RBC-ENTMCNC: 32.7 GM/DL — SIGNIFICANT CHANGE UP (ref 32–36)
MCHC RBC-ENTMCNC: 33.9 GM/DL — SIGNIFICANT CHANGE UP (ref 32–36)
MCV RBC AUTO: 85.4 FL — SIGNIFICANT CHANGE UP (ref 80–100)
MCV RBC AUTO: 86.9 FL — SIGNIFICANT CHANGE UP (ref 80–100)
MCV RBC AUTO: 87.4 FL — SIGNIFICANT CHANGE UP (ref 80–100)
METHGB MFR BLDA: 0.3 % — SIGNIFICANT CHANGE UP
NRBC # BLD: 0 /100 WBCS — SIGNIFICANT CHANGE UP (ref 0–0)
O2 CT VFR BLDA CALC: SIGNIFICANT CHANGE UP (ref 15–23)
OXYHGB MFR BLDA: 98 % — SIGNIFICANT CHANGE UP (ref 94–100)
PCO2 BLDA: 51 MMHG — HIGH (ref 35–48)
PH BLDA: 7.27 — LOW (ref 7.35–7.45)
PHOSPHATE SERPL-MCNC: 3.1 MG/DL — SIGNIFICANT CHANGE UP (ref 2.5–4.5)
PHOSPHATE SERPL-MCNC: 4.6 MG/DL — HIGH (ref 2.5–4.5)
PLATELET # BLD AUTO: 139 K/UL — LOW (ref 150–400)
PLATELET # BLD AUTO: 163 K/UL — SIGNIFICANT CHANGE UP (ref 150–400)
PLATELET # BLD AUTO: 175 K/UL — SIGNIFICANT CHANGE UP (ref 150–400)
PO2 BLDA: 182 MMHG — HIGH (ref 83–108)
POTASSIUM BLDA-SCNC: 3.7 MMOL/L — SIGNIFICANT CHANGE UP (ref 3.5–4.9)
POTASSIUM SERPL-MCNC: 3.8 MMOL/L — SIGNIFICANT CHANGE UP (ref 3.5–5.3)
POTASSIUM SERPL-MCNC: 3.9 MMOL/L — SIGNIFICANT CHANGE UP (ref 3.5–5.3)
POTASSIUM SERPL-SCNC: 3.8 MMOL/L — SIGNIFICANT CHANGE UP (ref 3.5–5.3)
POTASSIUM SERPL-SCNC: 3.9 MMOL/L — SIGNIFICANT CHANGE UP (ref 3.5–5.3)
RBC # BLD: 3.97 M/UL — LOW (ref 4.2–5.8)
RBC # BLD: 4.06 M/UL — LOW (ref 4.2–5.8)
RBC # BLD: 4.12 M/UL — LOW (ref 4.2–5.8)
RBC # FLD: 14.5 % — SIGNIFICANT CHANGE UP (ref 10.3–14.5)
SAO2 % BLDA: 99 % — SIGNIFICANT CHANGE UP (ref 95–100)
SODIUM BLDA-SCNC: 138 MMOL/L — SIGNIFICANT CHANGE UP (ref 138–146)
SODIUM SERPL-SCNC: 139 MMOL/L — SIGNIFICANT CHANGE UP (ref 135–145)
SODIUM SERPL-SCNC: 139 MMOL/L — SIGNIFICANT CHANGE UP (ref 135–145)
WBC # BLD: 10.02 K/UL — SIGNIFICANT CHANGE UP (ref 3.8–10.5)
WBC # BLD: 7.21 K/UL — SIGNIFICANT CHANGE UP (ref 3.8–10.5)
WBC # BLD: 8.7 K/UL — SIGNIFICANT CHANGE UP (ref 3.8–10.5)
WBC # FLD AUTO: 10.02 K/UL — SIGNIFICANT CHANGE UP (ref 3.8–10.5)
WBC # FLD AUTO: 7.21 K/UL — SIGNIFICANT CHANGE UP (ref 3.8–10.5)
WBC # FLD AUTO: 8.7 K/UL — SIGNIFICANT CHANGE UP (ref 3.8–10.5)

## 2019-08-06 PROCEDURE — 36833 AV FISTULA REVISION: CPT | Mod: GC

## 2019-08-06 PROCEDURE — 99232 SBSQ HOSP IP/OBS MODERATE 35: CPT | Mod: GC

## 2019-08-06 PROCEDURE — 99222 1ST HOSP IP/OBS MODERATE 55: CPT

## 2019-08-06 RX ORDER — HYDROMORPHONE HYDROCHLORIDE 2 MG/ML
0.5 INJECTION INTRAMUSCULAR; INTRAVENOUS; SUBCUTANEOUS EVERY 4 HOURS
Refills: 0 | Status: DISCONTINUED | OUTPATIENT
Start: 2019-08-06 | End: 2019-08-07

## 2019-08-06 RX ORDER — HYDROMORPHONE HYDROCHLORIDE 2 MG/ML
0.5 INJECTION INTRAMUSCULAR; INTRAVENOUS; SUBCUTANEOUS ONCE
Refills: 0 | Status: DISCONTINUED | OUTPATIENT
Start: 2019-08-06 | End: 2019-08-06

## 2019-08-06 RX ORDER — DOXERCALCIFEROL 2.5 UG/1
2 CAPSULE ORAL ONCE
Refills: 0 | Status: COMPLETED | OUTPATIENT
Start: 2019-08-06 | End: 2019-08-06

## 2019-08-06 RX ORDER — HEPARIN SODIUM 5000 [USP'U]/ML
5000 INJECTION INTRAVENOUS; SUBCUTANEOUS EVERY 8 HOURS
Refills: 0 | Status: DISCONTINUED | OUTPATIENT
Start: 2019-08-06 | End: 2019-08-07

## 2019-08-06 RX ORDER — ONDANSETRON 8 MG/1
4 TABLET, FILM COATED ORAL EVERY 6 HOURS
Refills: 0 | Status: DISCONTINUED | OUTPATIENT
Start: 2019-08-06 | End: 2019-08-07

## 2019-08-06 RX ORDER — HYDROMORPHONE HYDROCHLORIDE 2 MG/ML
0.25 INJECTION INTRAMUSCULAR; INTRAVENOUS; SUBCUTANEOUS EVERY 4 HOURS
Refills: 0 | Status: DISCONTINUED | OUTPATIENT
Start: 2019-08-06 | End: 2019-08-07

## 2019-08-06 RX ORDER — DOXERCALCIFEROL 2.5 UG/1
2 CAPSULE ORAL ONCE
Refills: 0 | Status: DISCONTINUED | OUTPATIENT
Start: 2019-08-06 | End: 2019-08-06

## 2019-08-06 RX ADMIN — DOXERCALCIFEROL 2 MICROGRAM(S): 2.5 CAPSULE ORAL at 19:53

## 2019-08-06 RX ADMIN — Medication 650 MILLIGRAM(S): at 01:13

## 2019-08-06 RX ADMIN — HEPARIN SODIUM 5000 UNIT(S): 5000 INJECTION INTRAVENOUS; SUBCUTANEOUS at 05:49

## 2019-08-06 RX ADMIN — Medication 650 MILLIGRAM(S): at 00:13

## 2019-08-06 RX ADMIN — HEPARIN SODIUM 5000 UNIT(S): 5000 INJECTION INTRAVENOUS; SUBCUTANEOUS at 21:40

## 2019-08-06 RX ADMIN — HYDROMORPHONE HYDROCHLORIDE 0.5 MILLIGRAM(S): 2 INJECTION INTRAMUSCULAR; INTRAVENOUS; SUBCUTANEOUS at 18:30

## 2019-08-06 RX ADMIN — HYDROMORPHONE HYDROCHLORIDE 0.5 MILLIGRAM(S): 2 INJECTION INTRAMUSCULAR; INTRAVENOUS; SUBCUTANEOUS at 18:10

## 2019-08-06 RX ADMIN — HYDROMORPHONE HYDROCHLORIDE 0.5 MILLIGRAM(S): 2 INJECTION INTRAMUSCULAR; INTRAVENOUS; SUBCUTANEOUS at 16:30

## 2019-08-06 NOTE — PROGRESS NOTE ADULT - ASSESSMENT
47 yo AA male with pmh of ESRD on HD TTS via LUE AVF x 3 years, HTN, who came to ED secondary to non functioning AVF.   Nephrology consult is placed in regards to ESRD on HD.   Patient does not know why he ended being on HD.   He does not know dry weight.       # ESRD on HD TTS via LUE AVF (which now non functioning)  - last HD completed 8/5 for hyperkalemia of 6.4  - plan for HD today as per schedule but pt is refusing  - volume status and electrolytes noted, acceptable  - standing weight is 91 kg today  - anticipate HD on 8/7  - access to be revised by vascular  - renal diet    # HTN  - UF with HD   - please reconcile home meds    # Renal bone disease  - on hectorol with HD  - calcium/ phos noted 47 yo AA male with pmh of ESRD on HD TTS via LUE AVF x 3 years, HTN, who came to ED secondary to non functioning AVF.   Nephrology consult is placed in regards to ESRD on HD.   Patient does not know why he ended being on HD.   He does not know dry weight.       # ESRD on HD TTS via LUE AVF (which now non functioning)  - last HD completed 8/5 for hyperkalemia of 6.4  - plan for HD today as per schedule but pt is refusing  - volume status and electrolytes noted, acceptable  - standing weight is 91 kg today  - anticipate HD on 8/7  - access to be revised by vascular  - renal diet    # HTN  - UF with HD   - please reconcile home meds    # Anemia  - Hb 11.4 g/dl  - send iron studies    # Renal bone disease  - on hectorol with HD  - calcium/ phos noted

## 2019-08-06 NOTE — PROGRESS NOTE ADULT - SUBJECTIVE AND OBJECTIVE BOX
Patient was seen and evaluated on dialysis.   HR: 88 (08-06-19 @ 19:00)  BP: 133/82 (08-06-19 @ 19:00)    08-06    139  |  104  |  68<H>  ----------------------------<  113<H>  3.9   |  19<L>  |  9.31<H>    Ca    8.3<L>      06 Aug 2019 15:00  Phos  4.6     08-06  Mg     1.9     08-06    TPro  8.7<H>  /  Alb  4.0  /  TBili  0.5  /  DBili  x   /  AST  15  /  ALT  16  /  AlkPhos  84  08-05    Continue dialysis:   Dialyzer:   180    QB: 400  K bath: 4  Goal UF:    2   L   over         180      min  Patient is tolerating the procedure well.

## 2019-08-06 NOTE — BRIEF OPERATIVE NOTE - OPERATION/FINDINGS
AV fistula cut-down. Mechanical thrombectomy performed. Fistulogram showed residual clot remaining in inflow tract. Further thrombect    AVF cut-down, embolectomy, revision of AVF anastomosis, fistulogram. Cutdown performed over AV fistula and copious clot evacuated distally and proximally. Mechanical thrombectomy performed. Fistulogram showed residual clot remaining in inflow. Several attempts made with balloon angioplasty and further thrombectomy performed. Fistulogram repeated, showing persistent area of inflow stenosis -- stent placed across area of stenosis. AV fistula anastomosis revised. Skin closed with 2-0 nylon sutures. Cutdown performed over AV fistula and copious clot evacuated distally and proximally. Mechanical thrombectomy performed. Fistulogram showed residual clot remaining in inflow. Several attempts made with balloon angioplasty and further thrombectomy performed. Fistulogram repeated, showing persistent area of inflow stenosis -- stent placed across area of stenosis. AV fistula anastomosis revised. Skin closed with 2-0 nylon sutures.    During the procedure we administered 4mg of tPA to thrombolyse clot seen within the fistula.

## 2019-08-06 NOTE — PROGRESS NOTE ADULT - SUBJECTIVE AND OBJECTIVE BOX
Vascular Surgery Post-Op Note, PCN:     Pre-Op Dx: AV FISTULA OCCLUSION;HYPERKALEMIA  AV fistula occlusion, initial encounter    Procedure: LUE AV fistula thrombectomy, repair  Surgeon: Ziyad    Subjective: Pt is doing well post-op, somnolent but responsive. He denies SOB, chest pain. Pain is controlled. No complaints at this time.    Vital Signs Last 24 Hrs  T(C): 36.4 (06 Aug 2019 16:43), Max: 37.7 (06 Aug 2019 00:30)  T(F): 97.6 (06 Aug 2019 16:43), Max: 99.9 (06 Aug 2019 00:30)  HR: 78 (06 Aug 2019 16:58) (63 - 99)  BP: 140/76 (06 Aug 2019 16:58) (129/79 - 178/102)  BP(mean): 98 (06 Aug 2019 15:39) (96 - 105)  RR: 16 (06 Aug 2019 16:58) (0 - 30)  SpO2: 96% (06 Aug 2019 16:58) (89% - 99%)    Physical Exam:  General: NAD, resting comfortably in bed. Somnolent but responds appropriately to questions.  Pulmonary: Nonlabored breathing, no respiratory distress  Extremities: WWP. Palpable thrill over site of fistula LUE. Radial pulse palpable.  Neuro: Oriented x3. Moves all extremities.        LABS:                        11.7   10.02 )-----------( 139      ( 06 Aug 2019 15:00 )             35.8     08-06    139  |  104  |  68<H>  ----------------------------<  113<H>  3.9   |  19<L>  |  9.31<H>    Ca    8.3<L>      06 Aug 2019 15:00  Phos  4.6     08-06  Mg     1.9     08-06    TPro  8.7<H>  /  Alb  4.0  /  TBili  0.5  /  DBili  x   /  AST  15  /  ALT  16  /  AlkPhos  84  08-05    PT/INR - ( 05 Aug 2019 17:05 )   PT: 13.0 sec;   INR: 1.15          PTT - ( 05 Aug 2019 17:05 )  PTT:32.4 sec  CAPILLARY BLOOD GLUCOSE      POCT Blood Glucose.: 93 mg/dL (06 Aug 2019 12:59)  POCT Blood Glucose.: 100 mg/dL (05 Aug 2019 19:56)  POCT Blood Glucose.: 53 mg/dL (05 Aug 2019 18:10)    LIVER FUNCTIONS - ( 05 Aug 2019 17:05 )  Alb: 4.0 g/dL / Pro: 8.7 g/dL / ALK PHOS: 84 U/L / ALT: 16 U/L / AST: 15 U/L / GGT: x               Radiology and Additional Studies:    Assessment:48y Male s/p above procedure    Plan: 48M with PMH ESRD on dialysis POD 0 s/p AV fistula thrombectomy and repair following occlusion of fistula.    Pain/nausea control PRN  Home meds  Incentive spirometer/OOB/Ambulate  Vitals per protocol  IVF, Diet: Renal precautions  ToV? [place]?  AM labs

## 2019-08-06 NOTE — CONSULT NOTE ADULT - SUBJECTIVE AND OBJECTIVE BOX
INTERNAL MEDICINE SERVICE INITIAL CONSULT NOTE    HPI:  Mr. Soares is a 49 yo M with LUE AVF creation (3 years ago) 2/2 history of ESRD (since 3 years ago) on HD Tu/Thurs/Sa -- last successful HD session was Thursday, August 1st. Patient reports attempted hemodialysis on Saturday, which was aborted due to lack of blood flow after cannulation. Patient was seen in clinic by Dr. Adrian today; in office ultrasound was reportedly positive for clot in the LUE AVF and patient was told to come to ED.     In the ED, patient is afebrile and hemodynamically stable. WBC is 5.8, hgb 11.6, , K 6.4, Cr 11.4.    PMH: ESRD (since 3 years ago) on HD Tu/Thurs/Sa  PSx: LUE AVF creation (3 years ago)   Allergies: NKDA  Meds: calcium acetate, magnesium supplement, multivitamin  FH: patient denies FH of cancers or IBD  SH: denies tobacco, ETOH, or drug use (05 Aug 2019 17:57)      ADDITIONAL MEDICINE HPI:    REVIEW OF SYSTEMS:   Otherwise negative except as specified in HPI    PAST MEDICAL HISTORY:     PAST SURGICAL HISTORY:    FAMILY HISTORY:    SOCIAL HISTORY:  Tobacco use:  EtOH use:  Illicit drug use:    MEDICATIONS:  MEDICATIONS  (STANDING):    MEDICATIONS  (PRN):      ALLERGIES:  Allergies    No Known Allergies    Intolerances        VITAL SIGNS:  Vital Signs Last 24 Hrs  T(C): 37.7 (06 Aug 2019 09:35), Max: 37.7 (06 Aug 2019 00:30)  T(F): 99.8 (06 Aug 2019 09:35), Max: 99.9 (06 Aug 2019 00:30)  HR: 92 (06 Aug 2019 09:00) (63 - 99)  BP: 167/99 (06 Aug 2019 09:00) (151/83 - 178/102)  BP(mean): --  RR: 17 (06 Aug 2019 09:00) (16 - 20)  SpO2: 95% (06 Aug 2019 09:00) (95% - 100%)    08-05-19 @ 07:01  -  08-06-19 @ 07:00  --------------------------------------------------------  IN:  Total IN: 0 mL    OUT:    Other: 2000 mL    Voided: 300 mL  Total OUT: 2300 mL    Total NET: -2300 mL      08-06-19 @ 07:01  -  08-06-19 @ 13:16  --------------------------------------------------------  IN:  Total IN: 0 mL    OUT:  Total OUT: 0 mL    Total NET: 0 mL          PHYSICAL EXAM:  Constitutional: WDWN resting comfortably in bed; NAD  Head: NC/AT  Eyes: PERRL, EOMI, anicteric sclera  ENT: no nasal discharge; uvula midline, no oropharyngeal erythema or exudates; MMM  Neck: supple; no JVD or thyromegaly  Respiratory: CTA B/L; no W/R/R, no retractions  Cardiac: +S1/S2; RRR; no M/R/G; PMI non-displaced  Gastrointestinal: abdomen soft, NT/ND; no rebound or guarding; +BSx4  Genitourinary: normal external genitalia  Back: spine midline, no bony tenderness or step-offs; no CVAT B/L  Extremities: WWP, no clubbing or cyanosis; no peripheral edema  Musculoskeletal: NROM x4; no joint swelling, tenderness or erythema  Vascular: 2+ radial, femoral, DP/PT pulses B/L  Dermatologic: skin warm, dry and intact; no rashes, wounds, or scars  Lymphatic: no submandibular or cervical LAD  Neurologic: AAOx3; CNII-XII grossly intact; no focal deficits  Psychiatric: affect and characteristics of appearance, verbalizations, behaviors are appropriate    LABS:                        11.5   7.21  )-----------( 163      ( 06 Aug 2019 02:47 )             33.9     08-06    139  |  101  |  62<H>  ----------------------------<  90  3.8   |  22  |  7.84<H>    Ca    8.9      06 Aug 2019 02:47  Phos  3.1     08-06  Mg     1.9     08-06    TPro  8.7<H>  /  Alb  4.0  /  TBili  0.5  /  DBili  x   /  AST  15  /  ALT  16  /  AlkPhos  84  08-05    PT/INR - ( 05 Aug 2019 17:05 )   PT: 13.0 sec;   INR: 1.15          PTT - ( 05 Aug 2019 17:05 )  PTT:32.4 sec        CAPILLARY BLOOD GLUCOSE      POCT Blood Glucose.: 93 mg/dL (06 Aug 2019 12:59)  POCT Blood Glucose.: 100 mg/dL (05 Aug 2019 19:56)  POCT Blood Glucose.: 53 mg/dL (05 Aug 2019 18:10)          RADIOLOGY & ADDITIONAL TESTS: Reviewed. INTERNAL MEDICINE SERVICE INITIAL CONSULT NOTE    HPI:  Mr. Soares is a 47 yo M with LUE AVF creation (3 years ago) 2/2 history of ESRD (since 3 years ago) on HD Tu/Thurs/Sa -- last successful HD session was Thursday, August 1st. Patient reports attempted hemodialysis on Saturday, which was aborted due to lack of blood flow after cannulation. Patient was seen in clinic by Dr. Adrian today; in office ultrasound was reportedly positive for clot in the LUE AVF and patient was told to come to ED.     In the ED, patient is afebrile and hemodynamically stable. WBC is 5.8, hgb 11.6, , K 6.4, Cr 11.4.    PMH: ESRD (since 3 years ago) on HD Tu/Thurs/Sa  PSx: LUE AVF creation (3 years ago)   Allergies: NKDA  Meds: calcium acetate, magnesium supplement, multivitamin  FH: patient denies FH of cancers or IBD  SH: denies tobacco, ETOH, or drug use (05 Aug 2019 17:57)      ADDITIONAL MEDICINE HPI:    REVIEW OF SYSTEMS:   Otherwise negative except as specified in HPI    PAST MEDICAL HISTORY:   ESRD    PAST SURGICAL HISTORY:  AVF  FAMILY HISTORY:  denies family hx of kidney disease, DM, HTN    SOCIAL HISTORY:  Tobacco use: no  EtOH use:  Illicit drug use:    MEDICATIONS:  MEDICATIONS  (STANDING):    MEDICATIONS  (PRN):      ALLERGIES:  Allergies    No Known Allergies    Intolerances        VITAL SIGNS:  Vital Signs Last 24 Hrs  T(C): 37.7 (06 Aug 2019 09:35), Max: 37.7 (06 Aug 2019 00:30)  T(F): 99.8 (06 Aug 2019 09:35), Max: 99.9 (06 Aug 2019 00:30)  HR: 92 (06 Aug 2019 09:00) (63 - 99)  BP: 167/99 (06 Aug 2019 09:00) (151/83 - 178/102)  BP(mean): --  RR: 17 (06 Aug 2019 09:00) (16 - 20)  SpO2: 95% (06 Aug 2019 09:00) (95% - 100%)    08-05-19 @ 07:01  -  08-06-19 @ 07:00  --------------------------------------------------------  IN:  Total IN: 0 mL    OUT:    Other: 2000 mL    Voided: 300 mL  Total OUT: 2300 mL    Total NET: -2300 mL      08-06-19 @ 07:01  -  08-06-19 @ 13:16  --------------------------------------------------------  IN:  Total IN: 0 mL    OUT:  Total OUT: 0 mL    Total NET: 0 mL          PHYSICAL EXAM:  Constitutional: WDWN resting comfortably in bed; NAD  Head: NC/AT  Eyes: PERRL, EOMI, anicteric sclera  ENT: no nasal discharge; uvula midline, no oropharyngeal erythema or exudates; MMM  Neck: supple; no JVD or thyromegaly  Respiratory: CTA B/L; no W/R/R, no retractions  Cardiac: +S1/S2; RRR; no M/R/G; PMI non-displaced  Gastrointestinal: abdomen soft, NT/ND; no rebound or guarding; +BSx4  Extremities: WWP, no clubbing or cyanosis; no peripheral edema  Neurologic: AAOx3; CNII-XII grossly intact; no focal deficits      LABS:                        11.5   7.21  )-----------( 163      ( 06 Aug 2019 02:47 )             33.9     08-06    139  |  101  |  62<H>  ----------------------------<  90  3.8   |  22  |  7.84<H>    Ca    8.9      06 Aug 2019 02:47  Phos  3.1     08-06  Mg     1.9     08-06    TPro  8.7<H>  /  Alb  4.0  /  TBili  0.5  /  DBili  x   /  AST  15  /  ALT  16  /  AlkPhos  84  08-05    PT/INR - ( 05 Aug 2019 17:05 )   PT: 13.0 sec;   INR: 1.15          PTT - ( 05 Aug 2019 17:05 )  PTT:32.4 sec        CAPILLARY BLOOD GLUCOSE      POCT Blood Glucose.: 93 mg/dL (06 Aug 2019 12:59)  POCT Blood Glucose.: 100 mg/dL (05 Aug 2019 19:56)  POCT Blood Glucose.: 53 mg/dL (05 Aug 2019 18:10)          RADIOLOGY & ADDITIONAL TESTS: Reviewed.

## 2019-08-06 NOTE — CONSULT NOTE ADULT - ATTENDING COMMENTS
48 u/o male with     1-LUE AVF clot- plan for OR today  2- ESRD- on HD  3- Elevated BP- if persistently >150 can start norvasc 5 mg

## 2019-08-06 NOTE — PROGRESS NOTE ADULT - SUBJECTIVE AND OBJECTIVE BOX
Patient seen and evaluated at bedside.   s/p HD on 8/5 for hyperkalemia @ 6.4  Pt is c/o headache and right site neck pain.  Plan for HD today but pt is refusing.        Meds:        T(C): 37.7 (08-06-19 @ 09:35), Max: 37.7 (08-06-19 @ 00:30)  HR: 92 (08-06-19 @ 09:00) (63 - 99)  BP: 167/99 (08-06-19 @ 09:00) (151/83 - 178/102)  RR: 17 (08-06-19 @ 09:00) (16 - 20)  SpO2: 95% (08-06-19 @ 09:00) (95% - 100%)    Input/Output      08-05-19 @ 07:01  -  08-06-19 @ 07:00  --------------------------------------------------------  IN: 0 mL / OUT: 2300 mL / NET: -2300 mL    08-06-19 @ 07:01  -  08-06-19 @ 13:37  --------------------------------------------------------  IN: 0 mL / OUT: 0 mL / NET: 0 mL        Weight (kg): 92.6 (08-06-19 @ 05:51)      ROS:   Gen: +headache, no fever  Cardiovascular: no chest pain  Respiratory: no cough, no sputum, no sob   Gastrointestinal: no nausea, no vomiting, no change in bowel habits  Neurologic: +headache  : no urinary symptoms        PHYSICAL EXAM:  GENERAL: well-developed, well nourished, alert, no acute distress at present  NECK: supple, No JVD, R IJ in place, no erythema, swelling, tenderness  CHEST/LUNG: Clear to auscultation bilaterally  HEART: normal S1S2, RRR  ABDOMEN: Soft, Nontender, +BS, No flank tenderness bilateral  EXTREMITIES: LUE arm swelling, AVF no bruit, no thrill, BLT LE +1 pitting edema   NEUROLOGY: AAO x3, no focal neurological deficit        LABS:                        11.4   8.70  )-----------( 175      ( 06 Aug 2019 13:20 )             35.5     08-06    139  |  101  |  62<H>  ----------------------------<  90  3.8   |  22  |  7.84<H>    Ca    8.9      06 Aug 2019 02:47  Phos  3.1     08-06  Mg     1.9     08-06    TPro  8.7<H>  /  Alb  4.0  /  TBili  0.5  /  DBili  x   /  AST  15  /  ALT  16  /  AlkPhos  84  08-05    Hepatitis C Virus S/CO Ratio: 0.11 S/CO (08-06 @ 02:47)  Hepatitis C Virus Interpretation: Nonreact (08-06 @ 02:47)    PT/INR - ( 05 Aug 2019 17:05 )   PT: 13.0 sec;   INR: 1.15          PTT - ( 05 Aug 2019 17:05 )  PTT:32.4 sec          RADIOLOGY & ADDITIONAL STUDIES:      < from: Xray Chest 1 View- PORTABLE-Urgent (08.05.19 @ 19:48) >  Findings:    Support devices: Interval placement of a right IJ catheter with the tip   overlying the cavoatrial junction.    Cardiac/mediastinum/hilum: Unremarkable.    Lung parenchyma/Pleura: No focal parenchymal opacities, pleural effusion   or pneumothorax are present.    Skeleton/soft tissues: Unremarkable.    Impression:      No radiographic evidence of acute cardiopulmonary disease.    Right IJ catheter in appropriate position.    < end of copied text > Patient seen and evaluated at bedside.   s/p HD on 8/5 for hyperkalemia @ 6.4  Pt is c/o headache and right site neck pain.  Plan for HD today but pt is refusing.        Meds:        T(C): 37.7 (08-06-19 @ 09:35), Max: 37.7 (08-06-19 @ 00:30)  HR: 92 (08-06-19 @ 09:00) (63 - 99)  BP: 167/99 (08-06-19 @ 09:00) (151/83 - 178/102)  RR: 17 (08-06-19 @ 09:00) (16 - 20)  SpO2: 95% (08-06-19 @ 09:00) (95% - 100%)    Input/Output      08-05-19 @ 07:01  -  08-06-19 @ 07:00  --------------------------------------------------------  IN: 0 mL / OUT: 2300 mL / NET: -2300 mL    08-06-19 @ 07:01  -  08-06-19 @ 13:37  --------------------------------------------------------  IN: 0 mL / OUT: 0 mL / NET: 0 mL        Weight (kg): 92.6 (08-06-19 @ 05:51)      ROS:   Gen: +headache, no fever  Cardiovascular: no chest pain  Respiratory: no cough, no sputum, no sob   Gastrointestinal: no nausea, no vomiting, no change in bowel habits  Neurologic: +headache  : no urinary symptoms        PHYSICAL EXAM:  GENERAL: well-developed, well nourished, alert, no acute distress at present  NECK: supple, No JVD, R IJ in place, no erythema, swelling, tenderness  CHEST/LUNG: Clear to auscultation bilaterally  HEART: normal S1S2, RRR  ABDOMEN: Soft, Nontender, +BS, No flank tenderness bilateral  EXTREMITIES: LUE arm swelling, AVF no bruit, no thrill, BLT LE trace pitting edema   NEUROLOGY: AAO x3, no focal neurological deficit        LABS:                        11.4   8.70  )-----------( 175      ( 06 Aug 2019 13:20 )             35.5     08-06    139  |  101  |  62<H>  ----------------------------<  90  3.8   |  22  |  7.84<H>    Ca    8.9      06 Aug 2019 02:47  Phos  3.1     08-06  Mg     1.9     08-06    TPro  8.7<H>  /  Alb  4.0  /  TBili  0.5  /  DBili  x   /  AST  15  /  ALT  16  /  AlkPhos  84  08-05    Hepatitis C Virus S/CO Ratio: 0.11 S/CO (08-06 @ 02:47)  Hepatitis C Virus Interpretation: Nonreact (08-06 @ 02:47)    PT/INR - ( 05 Aug 2019 17:05 )   PT: 13.0 sec;   INR: 1.15          PTT - ( 05 Aug 2019 17:05 )  PTT:32.4 sec          RADIOLOGY & ADDITIONAL STUDIES:      < from: Xray Chest 1 View- PORTABLE-Urgent (08.05.19 @ 19:48) >  Findings:    Support devices: Interval placement of a right IJ catheter with the tip   overlying the cavoatrial junction.    Cardiac/mediastinum/hilum: Unremarkable.    Lung parenchyma/Pleura: No focal parenchymal opacities, pleural effusion   or pneumothorax are present.    Skeleton/soft tissues: Unremarkable.    Impression:      No radiographic evidence of acute cardiopulmonary disease.    Right IJ catheter in appropriate position.    < end of copied text >

## 2019-08-06 NOTE — PROGRESS NOTE ADULT - SUBJECTIVE AND OBJECTIVE BOX
24hr Events:  O/N: S/p HD cath placement, s/p HD, NPO for fistulogram, pre-op, 2am K+ 3.8      Assessment/Plan:    Mr. Soares is a 49 yo M presenting with unsuccessful hemodialysis secondary to LUE AVF clot.     - NPO for fistulogram  -Pain control PRN  -F/u AM labs  -Home medication as appropriate 24hr Events:  O/N: S/p HD cath placement, s/p HD, NPO for fistulogram, pre-op, 2am K+ 3.8    Patient is a 48y old  Male who presents with a chief complaint of clot in LUE AVF. He received a temporary catheter and dialysis yesterday, will be going to OR today for fistulogram.      INTERVAL HPI/OVERNIGHT EVENTS:    Review of Systems: 12 point review of systems otherwise negative      MEDICATIONS  (STANDING):  docusate sodium 100 milliGRAM(s) Oral three times a day  heparin  Injectable 5000 Unit(s) SubCutaneous every 8 hours    MEDICATIONS  (PRN):  acetaminophen   Tablet .. 650 milliGRAM(s) Oral every 6 hours PRN Severe Pain (7 - 10)      Vital Signs Last 24 Hrs  T(C): 37.7 (06 Aug 2019 05:51), Max: 37.7 (06 Aug 2019 00:30)  T(F): 99.9 (06 Aug 2019 05:51), Max: 99.9 (06 Aug 2019 00:30)  HR: 83 (06 Aug 2019 05:51) (63 - 99)  BP: 158/92 (06 Aug 2019 05:51) (151/83 - 178/102)  RR: 18 (06 Aug 2019 05:51) (16 - 20)  SpO2: 95% (06 Aug 2019 05:51) (95% - 100%)  CAPILLARY BLOOD GLUCOSE      CAPILLARY BLOOD GLUCOSE      POCT Blood Glucose.: 100 mg/dL (05 Aug 2019 19:56)  POCT Blood Glucose.: 53 mg/dL (05 Aug 2019 18:10)      08-05 @ 07:01  -  08-06 @ 07:00  --------------------------------------------------------  IN: 0 mL / OUT: 2300 mL / NET: -2300 mL        Physical Exam:    General:  NAD.  Lungs:  non-labored breathing. No respiratory distress.  :  No braswell  Neuro:  AAOx3, non-focal.  No Restraints    LABS:                        11.5   7.21  )-----------( 163      ( 06 Aug 2019 02:47 )             33.9     08-06    139  |  101  |  62<H>  ----------------------------<  90  3.8   |  22  |  7.84<H>    Ca    8.9      06 Aug 2019 02:47  Phos  3.1     08-06  Mg     1.9     08-06    TPro  8.7<H>  /  Alb  4.0  /  TBili  0.5  /  DBili  x   /  AST  15  /  ALT  16  /  AlkPhos  84  08-05    PT/INR - ( 05 Aug 2019 17:05 )   PT: 13.0 sec;   INR: 1.15          PTT - ( 05 Aug 2019 17:05 )  PTT:32.4 sec        RADIOLOGY & ADDITIONAL TESTS:    ---------------------------------------------------------------------------  I personally reviewed: [  ]EKG   [  ]CXR    [  ] CT    [  ]Other  ---------------------------------------------------------------------------  PLEASE CHECK WHEN PRESENT:     [  ]Heart Failure     [  ] Acute     [  ] Acute on Chronic     [  ] Chronic  -------------------------------------------------------------------     [  ]Diastolic [HFpEF]     [  ]Systolic [HFrEF]     [  ]Combined [HFpEF & HFrEF]     [  ]Other:  -------------------------------------------------------------------  [ ] Respiratory failure  [ ] Acute cor pulmonale  [ ] Asthma/COPD Exacerbation  [ ] Pleural effusion  [ ] Aspiration pneumonia  -------------------------------------------------------------------  [  ]JIM     [  ]ATN     [  ]Reneal Medullary Necrosis     [  ]Renal Cortical Necrosis     [  ]Other Pathological Lesions:    [  ]CKD 1  [  ]CKD 2  [  ]CKD 3  [  ]CKD 4  [X]CKD 5  [  ]Other  -------------------------------------------------------------------  [  ]Diabetes  [  ] Diabetic PVD Ulcer  [  ] Neuropathic ulcer to DM  [  ] Diabetes with Nephropathy  [  ] Osteomyelitis due to diabetes  --------------------------------------------------------------------  [  ]Malnutrition: See Nutrition Note  [  ]Cachexia  [  ]Other:   [  ]Supplement Ordered:  [  ]Morbid Obesity (BMI >=40]  ---------------------------------------------------------------------  [ ] Sepsis/severe sepsis/septic shock  [ ] UTI  [ ] Pneumonia  -----------------------------------------------------------------------  [ ] Acidosis/alkalosis  [ ] Fluid overload  [ ] Hypokalemia  [X] Hyperkalemia  [ ] Hypomagnesemia  [ ] Hypophosphatemia  [ ] Hyperphosphatemia  ------------------------------------------------------------------------  [ ] Acute blood loss anemia  [ ] Post op blood loss anemia  [ ] Iron deficiency anemia  [ ] Anemia due to chronic disease  [ ] Hypercoagulable state  ----------------------------------------------------------------------  [ ] Cerebral infarction  [ ] Transient ischemia attack  [ ] Encephalopathy      Assessment/Plan: Mr. Soares is a 49 yo M presenting with unsuccessful hemodialysis secondary to LUE AVF clot. Will be getting fistulogram today.     - NPO for fistulogram  -Pain control PRN  -F/u AM labs  -Home medication as appropriate

## 2019-08-07 ENCOUNTER — TRANSCRIPTION ENCOUNTER (OUTPATIENT)
Age: 49
End: 2019-08-07

## 2019-08-07 VITALS
RESPIRATION RATE: 17 BRPM | OXYGEN SATURATION: 93 % | HEART RATE: 85 BPM | SYSTOLIC BLOOD PRESSURE: 136 MMHG | DIASTOLIC BLOOD PRESSURE: 76 MMHG

## 2019-08-07 PROCEDURE — 86850 RBC ANTIBODY SCREEN: CPT

## 2019-08-07 PROCEDURE — 36415 COLL VENOUS BLD VENIPUNCTURE: CPT

## 2019-08-07 PROCEDURE — 82330 ASSAY OF CALCIUM: CPT

## 2019-08-07 PROCEDURE — C1887: CPT

## 2019-08-07 PROCEDURE — 87340 HEPATITIS B SURFACE AG IA: CPT

## 2019-08-07 PROCEDURE — 85730 THROMBOPLASTIN TIME PARTIAL: CPT

## 2019-08-07 PROCEDURE — 85027 COMPLETE CBC AUTOMATED: CPT

## 2019-08-07 PROCEDURE — 99231 SBSQ HOSP IP/OBS SF/LOW 25: CPT | Mod: GC

## 2019-08-07 PROCEDURE — C1725: CPT

## 2019-08-07 PROCEDURE — 84132 ASSAY OF SERUM POTASSIUM: CPT

## 2019-08-07 PROCEDURE — 87389 HIV-1 AG W/HIV-1&-2 AB AG IA: CPT

## 2019-08-07 PROCEDURE — 99285 EMERGENCY DEPT VISIT HI MDM: CPT | Mod: 25

## 2019-08-07 PROCEDURE — C1889: CPT

## 2019-08-07 PROCEDURE — 85025 COMPLETE CBC W/AUTO DIFF WBC: CPT

## 2019-08-07 PROCEDURE — 86900 BLOOD TYPING SEROLOGIC ABO: CPT

## 2019-08-07 PROCEDURE — 76000 FLUOROSCOPY <1 HR PHYS/QHP: CPT

## 2019-08-07 PROCEDURE — 80074 ACUTE HEPATITIS PANEL: CPT

## 2019-08-07 PROCEDURE — 85610 PROTHROMBIN TIME: CPT

## 2019-08-07 PROCEDURE — 96374 THER/PROPH/DIAG INJ IV PUSH: CPT

## 2019-08-07 PROCEDURE — 94640 AIRWAY INHALATION TREATMENT: CPT

## 2019-08-07 PROCEDURE — 82962 GLUCOSE BLOOD TEST: CPT

## 2019-08-07 PROCEDURE — 86803 HEPATITIS C AB TEST: CPT

## 2019-08-07 PROCEDURE — C1876: CPT

## 2019-08-07 PROCEDURE — 90935 HEMODIALYSIS ONE EVALUATION: CPT

## 2019-08-07 PROCEDURE — 83735 ASSAY OF MAGNESIUM: CPT

## 2019-08-07 PROCEDURE — C1894: CPT

## 2019-08-07 PROCEDURE — 84295 ASSAY OF SERUM SODIUM: CPT

## 2019-08-07 PROCEDURE — 71045 X-RAY EXAM CHEST 1 VIEW: CPT

## 2019-08-07 PROCEDURE — 85018 HEMOGLOBIN: CPT

## 2019-08-07 PROCEDURE — 84100 ASSAY OF PHOSPHORUS: CPT

## 2019-08-07 PROCEDURE — 80053 COMPREHEN METABOLIC PANEL: CPT

## 2019-08-07 PROCEDURE — 86901 BLOOD TYPING SEROLOGIC RH(D): CPT

## 2019-08-07 PROCEDURE — 80048 BASIC METABOLIC PNL TOTAL CA: CPT

## 2019-08-07 PROCEDURE — 88304 TISSUE EXAM BY PATHOLOGIST: CPT

## 2019-08-07 PROCEDURE — 99233 SBSQ HOSP IP/OBS HIGH 50: CPT

## 2019-08-07 PROCEDURE — 93005 ELECTROCARDIOGRAM TRACING: CPT

## 2019-08-07 PROCEDURE — 96375 TX/PRO/DX INJ NEW DRUG ADDON: CPT

## 2019-08-07 PROCEDURE — 86923 COMPATIBILITY TEST ELECTRIC: CPT

## 2019-08-07 PROCEDURE — 86706 HEP B SURFACE ANTIBODY: CPT

## 2019-08-07 PROCEDURE — C1769: CPT

## 2019-08-07 RX ORDER — CALCIUM ACETATE 667 MG
667 TABLET ORAL
Refills: 0 | Status: DISCONTINUED | OUTPATIENT
Start: 2019-08-07 | End: 2019-08-07

## 2019-08-07 RX ORDER — CALCIUM ACETATE 667 MG
3 TABLET ORAL
Qty: 0 | Refills: 0 | DISCHARGE
Start: 2019-08-07

## 2019-08-07 RX ORDER — CALCIUM ACETATE 667 MG
2001 TABLET ORAL
Refills: 0 | Status: DISCONTINUED | OUTPATIENT
Start: 2019-08-07 | End: 2019-08-07

## 2019-08-07 RX ADMIN — Medication 667 MILLIGRAM(S): at 11:19

## 2019-08-07 RX ADMIN — Medication 2001 MILLIGRAM(S): at 13:12

## 2019-08-07 RX ADMIN — HEPARIN SODIUM 5000 UNIT(S): 5000 INJECTION INTRAVENOUS; SUBCUTANEOUS at 05:37

## 2019-08-07 NOTE — DISCHARGE NOTE PROVIDER - NSDCCPCAREPLAN_GEN_ALL_CORE_FT
PRINCIPAL DISCHARGE DIAGNOSIS  Diagnosis: AV fistula occlusion, initial encounter  Assessment and Plan of Treatment:       SECONDARY DISCHARGE DIAGNOSES  Diagnosis: ESRD on dialysis  Assessment and Plan of Treatment:     Diagnosis: Hyperkalemia  Assessment and Plan of Treatment:

## 2019-08-07 NOTE — DISCHARGE NOTE PROVIDER - HOSPITAL COURSE
Mr. Soares is a 47 yo M with LUE AVF creation (3 years ago) 2/2 history of ESRD (since 3 years ago) on HD Tu/Thurs/Sa -- last successful HD session was Thursday, August 1st. Patient reports attempted hemodialysis on Saturday, which was aborted due to lack of blood flow after cannulation. Patient was seen in clinic by Dr. Adrian today; in office ultrasound was reportedly positive for clot in the LUE AVF and patient was told to come to ED.         Admitted 8/5/19. Taken to OR 8/6/19. He underwent Left arm fistulogram, open embolectomy and revision of AVF anastomosis. Pt tolerated procedure well. Post op course unremarkable. He had successful dialysis post op. His AVF had thrill. His dressing was cdi. Discharged instructions d/w patient. He was d/c home in stable condition.

## 2019-08-07 NOTE — PROGRESS NOTE ADULT - SUBJECTIVE AND OBJECTIVE BOX
Patient seen and evaluated at bedside.   No acute events overnight.  Patient denies any complains at present.  s/p AVF mechanical thrombectomy, stenting for inflow stenosis, AV fistula anastomosis revision on 8/6.   s/p HD on 8/6.          Meds:    calcium acetate 2000 milliGRAM(s) Oral three times a day with meals  heparin  Injectable 5000 Unit(s) SubCutaneous every 8 hours  HYDROmorphone  Injectable 0.25 milliGRAM(s) IV Push every 4 hours PRN  HYDROmorphone  Injectable 0.5 milliGRAM(s) IV Push every 4 hours PRN  ondansetron Injectable 4 milliGRAM(s) IV Push every 6 hours PRN      T(C): 37.4 (08-07-19 @ 09:17), Max: 37.6 (08-07-19 @ 05:19)  HR: 85 (08-07-19 @ 11:20) (78 - 96)  BP: 136/76 (08-07-19 @ 11:20) (129/74 - 152/90)  RR: 17 (08-07-19 @ 11:20) (0 - 30)  SpO2: 93% (08-07-19 @ 11:20) (89% - 99%)    Input/Output      08-06-19 @ 07:01  -  08-07-19 @ 07:00  --------------------------------------------------------  IN: 150 mL / OUT: 1220 mL / NET: -1070 mL    08-07-19 @ 07:01  -  08-07-19 @ 12:01  --------------------------------------------------------  IN: 180 mL / OUT: 100 mL / NET: 80 mL            ROS:   Gen: no fever  Cardiovascular: no chest pain  Respiratory: no cough, no sputum  Gastrointestinal: no nausea, no vomiting, no change in bowel habits  Neurologic: no headache  : no urinary symptoms          PHYSICAL EXAM:  GENERAL: well-developed, well nourished, alert, no acute distress at present  NECK: supple, No JVD, R IJ in place, no erythema, swelling, tenderness  CHEST/LUNG: Clear to auscultation bilaterally  HEART: normal S1S2, RRR  ABDOMEN: Soft, Nontender, +BS, No flank tenderness bilateral  EXTREMITIES: LUE arm swelling, AVF good bruit and thrill appreciated, BLT LE trace pitting edema   NEUROLOGY: AAO x3, no focal neurological deficit        LABS:                                       11.7   10.02 )-----------( 139      ( 06 Aug 2019 15:00 )             35.8       08-06    139  |  104  |  68<H>  ----------------------------<  113<H>  3.9   |  19<L>  |  9.31<H>    Ca    8.3<L>      06 Aug 2019 15:00  Phos  4.6     08-06  Mg     1.9     08-06    TPro  8.7<H>  /  Alb  4.0  /  TBili  0.5  /  DBili  x   /  AST  15  /  ALT  16  /  AlkPhos  84  08-05      PT/INR - ( 05 Aug 2019 17:05 )   PT: 13.0 sec;   INR: 1.15          PTT - ( 05 Aug 2019 17:05 )  PTT:32.4 sec                RADIOLOGY & ADDITIONAL STUDIES:

## 2019-08-07 NOTE — PROGRESS NOTE ADULT - REASON FOR ADMISSION
clot in LUE AVF

## 2019-08-07 NOTE — DISCHARGE NOTE PROVIDER - CARE PROVIDERS DIRECT ADDRESSES
,[email or web address]

## 2019-08-07 NOTE — PROGRESS NOTE ADULT - SUBJECTIVE AND OBJECTIVE BOX
24hr Events:  O/N: S/p HD, VSS  8/6: S/p AVF cut-down, embolectomy, revision of AVF anastomosis, fistulogram, POC WNL          Assessment/Plan;  Mr. Soares is a 49 yo M presenting with unsuccessful hemodialysis secondary to LUE AVF clot. Will be getting fistulogram today.     -Renal diet  -Pain control PRN  -F/u AM labs  -Home medication as appropriate 24hr Events:  O/N: S/p HD, VSS  8/6: S/p AVF cut-down, embolectomy, revision of AVF anastomosis, fistulogram, POC WNL    S. No complaints.    heparin  Injectable 5000      Allergies    No Known Allergies    Intolerances        Vital Signs Last 24 Hrs  T(C): 37.6 (07 Aug 2019 05:19), Max: 37.7 (06 Aug 2019 09:35)  T(F): 99.7 (07 Aug 2019 05:19), Max: 99.8 (06 Aug 2019 09:35)  HR: 86 (07 Aug 2019 06:00) (78 - 96)  BP: 129/74 (07 Aug 2019 06:00) (129/74 - 167/99)  BP(mean): 96 (07 Aug 2019 06:00) (96 - 105)  RR: 18 (07 Aug 2019 06:00) (0 - 30)  SpO2: 97% (07 Aug 2019 06:00) (89% - 99%)    Physical Exam:  General: Awake, alert, NAD   Pulmonary:  Cardiovascular:  Abdominal:  Extremities: Left upper arm dressing cdi. +thrill.   Pulses:   Right:                                                                           Left:  FEM [ ]2+ [ ]1+ [ ] doppler                                            FEM [ ]2+ [ ]1+ [ ] doppler    POP [ ]2+ [ ]1+ [ ] doppler                                            POP [ ]2+ [ ]1+ [ ] doppler    DP [ ]2+ [ ]1+ [ ] doppler                                               DP [ ]2+ [ ]1+ [ ] doppler  PT[ ]2+ [ ]1+ [ ] doppler                                                 PT [ ]2+ [ ]1+ [ ] doppler      LABS:                        11.7   10.02 )-----------( 139      ( 06 Aug 2019 15:00 )             35.8     08-06    139  |  104  |  68<H>  ----------------------------<  113<H>  3.9   |  19<L>  |  9.31<H>    Ca    8.3<L>      06 Aug 2019 15:00  Phos  4.6     08-06  Mg     1.9     08-06    TPro  8.7<H>  /  Alb  4.0  /  TBili  0.5  /  DBili  x   /  AST  15  /  ALT  16  /  AlkPhos  84  08-05    PT/INR - ( 05 Aug 2019 17:05 )   PT: 13.0 sec;   INR: 1.15          PTT - ( 05 Aug 2019 17:05 )  PTT:32.4 sec      RADIOLOGY & ADDITIONAL TESTS:      [  ]Heart Failure     [  ] Acute     [  ] Acute on Chronic     [  ] Chronic  -------------------------------------------------------------------     [  ]Diastolic [HFpEF]     [  ]Systolic [HFrEF]     [  ]Combined [HFpEF & HFrEF]     [  ]Other:  -------------------------------------------------------------------  [ ] Respiratory failure  [ ] Acute cor pulmonale  [ ] Asthma/COPD Exacerbation  [ ] Pleural effusion  [ ] Aspiration pneumonia  -------------------------------------------------------------------  [  ]JIM     [  ]ATN     [  ]Reneal Medullary Necrosis     [  ]Renal Cortical Necrosis     [  ]Other Pathological Lesions:    [  ]CKD 1  [  ]CKD 2  [  ]CKD 3  [  ]CKD 4  [X]CKD 5  [  ]Other  -------------------------------------------------------------------  [  ]Diabetes  [  ] Diabetic PVD Ulcer  [  ] Neuropathic ulcer to DM  [  ] Diabetes with Nephropathy  [  ] Osteomyelitis due to diabetes  --------------------------------------------------------------------  [  ]Malnutrition: See Nutrition Note  [  ]Cachexia  [  ]Other:   [  ]Supplement Ordered:  [  ]Morbid Obesity (BMI >=40]  ---------------------------------------------------------------------  [ ] Sepsis/severe sepsis/septic shock  [ ] UTI  [ ] Pneumonia  -----------------------------------------------------------------------  [ ] Acidosis/alkalosis  [ ] Fluid overload  [ ] Hypokalemia  [X] Hyperkalemia  [ ] Hypomagnesemia  [ ] Hypophosphatemia  [ ] Hyperphosphatemia  ------------------------------------------------------------------------  [ ] Acute blood loss anemia  [ ] Post op blood loss anemia  [ ] Iron deficiency anemia  [X] Anemia due to chronic disease  [ ] Hypercoagulable state  ----------------------------------------------------------------------  [ ] Cerebral infarction  [ ] Transient ischemia attack  [ ] Encephalopathy          Assessment/Plan;  Mr. Soares is a 49 yo M presenting with unsuccessful hemodialysis secondary to LUE AVF thrombosed. Now s/p fistulogram, open embolectomy and revision AVF anastomosis. Successful HD post op.     - d/c home today.  -Renal diet  -Pain control PRN  -F/u AM labs  -Home medication as appropriate

## 2019-08-07 NOTE — DISCHARGE NOTE PROVIDER - NSDCFUADDINST_GEN_ALL_CORE_FT
Activity: As tolerated.     Wound Care: Remove left arm dressing on Thursday, 8/8/19. Clean wound with soap and water daily. No dressing needed. You may shower. No baths, pools, hot tubs.     Follow up with Dr Bañuelos in 1 - 2 weeks. Call office for appointment.     Follow up with Dr Lackey in 2 weeks. Call office for appointment.     Call office for concerns. Fever >101.5. Redness or drainage from wound. Malfunctioning AVF. Activity: As tolerated.     Wound Care: Remove left arm dressing on Thursday, 8/8/19. Clean wound with soap and water daily. No dressing needed. You may shower. No baths, pools, hot tubs.     Follow up with Dr Lackey in 2 weeks. Call office for appointment.     Call office for concerns. Fever >101.5. Redness or drainage from wound. Malfunctioning AVF.

## 2019-08-07 NOTE — PROGRESS NOTE ADULT - SUBJECTIVE AND OBJECTIVE BOX
CC: No overnight events, no complaints.   Feeling well, pain is overall controlled.  Tolerates PO diet (+); Urination (+);   Denies cp, sob, dizziness, HA, abdominal pain, n/v.  Rest of ROS negative.     Vital Signs Last 24 Hrs  T(C): 37.4 (07 Aug 2019 09:17), Max: 37.6 (07 Aug 2019 05:19)  T(F): 99.4 (07 Aug 2019 09:17), Max: 99.7 (07 Aug 2019 05:19)  HR: 85 (07 Aug 2019 11:20) (78 - 96)  BP: 136/76 (07 Aug 2019 11:20) (129/74 - 152/90)  BP(mean): 96 (07 Aug 2019 06:00) (96 - 102)  RR: 17 (07 Aug 2019 11:20) (16 - 30)  SpO2: 93% (07 Aug 2019 11:20) (92% - 99%)    PHYSICAL EXAMINATION  * General: Not in acute distress. Awake and alert. Lying comfortably in bed.  * Head: Normocephalic, atraumatic.  * HEENT: ears no discharge, eyes PERRLA, nose no discharge, throat no exudates, normal tonsils.  * Neck: no JVD, supple.  * Lungs: Clear to auscultation, no rales, no wheezes.  * Cardio: Regular rate and rhythm, no murmurs, no rubs, no gallops. Good peripheral pulses.  * Abdomen: Soft, non-tender, non-distended, tympanic to percussion, no rebound, no guarding, no rigidity. Bowel sounds present. No suprapubic or CVA tenderness.  * : Deferred.  * Extremities: Acyanotic, no edema.  * Skin: Warm and dry.  * Neuro: Alert and oriented x 3. No focal deficits. Motor strength is 5/5 throughout. Sensation intact. Cranial nerves II-XII grossly intact.       MEDICATIONS  (STANDING):  calcium acetate 2001 milliGRAM(s) Oral three times a day with meals  heparin  Injectable 5000 Unit(s) SubCutaneous every 8 hours    MEDICATIONS  (PRN):  HYDROmorphone  Injectable 0.25 milliGRAM(s) IV Push every 4 hours PRN Moderate Pain (4 - 6)  HYDROmorphone  Injectable 0.5 milliGRAM(s) IV Push every 4 hours PRN Severe Pain (7 - 10)  ondansetron Injectable 4 milliGRAM(s) IV Push every 6 hours PRN Nausea

## 2019-08-07 NOTE — DISCHARGE NOTE PROVIDER - CARE PROVIDER_API CALL
Salvador Lackey)  Vascular Surgery  [address] [place] [address]  Phone: [phone]  Fax: [phone]  Follow Up Time: 2 weeks    Kem Bañuelos)  Surgery  [address] [facility] [address] [place] [address]  Phone: [phone]  Fax: [phone]  Follow Up Time: 1 week Salvador Lackey)  Vascular Surgery  [address] [place] [address]  Phone: [phone]  Fax: [phone]  Follow Up Time: 2 weeks

## 2019-08-07 NOTE — DISCHARGE NOTE PROVIDER - PROVIDER TOKENS
PROVIDER:[TOKEN:[[id number]],FOLLOWUP:[2 weeks]],PROVIDER:[TOKEN:[[id number]],FOLLOWUP:[1 week]] PROVIDER:[TOKEN:[[id number]],FOLLOWUP:[2 weeks]]

## 2019-08-07 NOTE — PROGRESS NOTE ADULT - ATTENDING COMMENTS
volume, neville ok  tolerated hd via AVF  next hd tomorrow- as outpt if dcd
appears acceptable to hold off on HD today as lytes and volume appear ok-- below dry weight by standing scale today   BP meds

## 2019-08-07 NOTE — PROGRESS NOTE ADULT - ASSESSMENT
49 yo AA male with pmh of ESRD on HD TTS via LUE AVF x 3 years, HTN, who came to ED secondary to non functioning AVF.   Nephrology consult is placed in regards to ESRD on HD.   Patient does not know why he ended being on HD.   He does not know dry weight.   s/p AVF mechanical thrombectomy, stenting for inflow stenosis, AV fistula anastomosis revision on 8/6.     # ESRD on HD TTS via LUE AVF   - last HD completed on 8/6 via AVF   - volume status noted, acceptable  - temporary R IJ cath has to be removed  - renal diet    # HTN  - UF with HD   - low salt diet  - fluid restriction     # Anemia  - Hb noted    # Renal bone disease  - on hectorol with HD  - on calcium acetate 2000 mg po tid with meals

## 2019-08-07 NOTE — PROGRESS NOTE ADULT - ASSESSMENT
48 u/o male with ESRD, HTN, admitted for clotted AV fistula.    1) LUE AVF clot, now resolved after OR  2) ESRD- on HD  3) HTN    Medically optimized for discharge home.

## 2019-08-08 ENCOUNTER — INBOUND DOCUMENT (OUTPATIENT)
Age: 49
End: 2019-08-08

## 2019-08-08 VITALS
OXYGEN SATURATION: 94 % | HEART RATE: 102 BPM | TEMPERATURE: 98 F | RESPIRATION RATE: 20 BRPM | SYSTOLIC BLOOD PRESSURE: 118 MMHG | WEIGHT: 201.06 LBS | DIASTOLIC BLOOD PRESSURE: 65 MMHG | HEIGHT: 69 IN

## 2019-08-08 LAB
ALBUMIN SERPL ELPH-MCNC: 3.7 G/DL — SIGNIFICANT CHANGE UP (ref 3.3–5)
ALP SERPL-CCNC: 74 U/L — SIGNIFICANT CHANGE UP (ref 40–120)
ALT FLD-CCNC: 24 U/L — SIGNIFICANT CHANGE UP (ref 10–45)
ANION GAP SERPL CALC-SCNC: 15 MMOL/L — SIGNIFICANT CHANGE UP (ref 5–17)
APTT BLD: 24.6 SEC — LOW (ref 27.5–36.3)
AST SERPL-CCNC: 26 U/L — SIGNIFICANT CHANGE UP (ref 10–40)
BASE EXCESS BLDV CALC-SCNC: 6.1 MMOL/L — SIGNIFICANT CHANGE UP
BASOPHILS # BLD AUTO: 0.03 K/UL — SIGNIFICANT CHANGE UP (ref 0–0.2)
BASOPHILS NFR BLD AUTO: 0.4 % — SIGNIFICANT CHANGE UP (ref 0–2)
BILIRUB SERPL-MCNC: 0.4 MG/DL — SIGNIFICANT CHANGE UP (ref 0.2–1.2)
BUN SERPL-MCNC: 47 MG/DL — HIGH (ref 7–23)
CALCIUM SERPL-MCNC: 8.7 MG/DL — SIGNIFICANT CHANGE UP (ref 8.4–10.5)
CHLORIDE SERPL-SCNC: 97 MMOL/L — SIGNIFICANT CHANGE UP (ref 96–108)
CO2 SERPL-SCNC: 27 MMOL/L — SIGNIFICANT CHANGE UP (ref 22–31)
CREAT SERPL-MCNC: 6.81 MG/DL — HIGH (ref 0.5–1.3)
EOSINOPHIL # BLD AUTO: 0.16 K/UL — SIGNIFICANT CHANGE UP (ref 0–0.5)
EOSINOPHIL NFR BLD AUTO: 2.2 % — SIGNIFICANT CHANGE UP (ref 0–6)
EXTRA SST TUBE: SIGNIFICANT CHANGE UP
GLUCOSE SERPL-MCNC: 152 MG/DL — HIGH (ref 70–99)
HCO3 BLDV-SCNC: 31 MMOL/L — HIGH (ref 20–27)
HCT VFR BLD CALC: 31.3 % — LOW (ref 39–50)
HGB BLD-MCNC: 10.4 G/DL — LOW (ref 13–17)
IMM GRANULOCYTES NFR BLD AUTO: 0.7 % — SIGNIFICANT CHANGE UP (ref 0–1.5)
INR BLD: 1.06 — SIGNIFICANT CHANGE UP (ref 0.88–1.16)
LACTATE SERPL-SCNC: 1.3 MMOL/L — SIGNIFICANT CHANGE UP (ref 0.5–2)
LYMPHOCYTES # BLD AUTO: 0.46 K/UL — LOW (ref 1–3.3)
LYMPHOCYTES # BLD AUTO: 6.2 % — LOW (ref 13–44)
MCHC RBC-ENTMCNC: 28.7 PG — SIGNIFICANT CHANGE UP (ref 27–34)
MCHC RBC-ENTMCNC: 33.2 GM/DL — SIGNIFICANT CHANGE UP (ref 32–36)
MCV RBC AUTO: 86.2 FL — SIGNIFICANT CHANGE UP (ref 80–100)
MONOCYTES # BLD AUTO: 0.61 K/UL — SIGNIFICANT CHANGE UP (ref 0–0.9)
MONOCYTES NFR BLD AUTO: 8.2 % — SIGNIFICANT CHANGE UP (ref 2–14)
NEUTROPHILS # BLD AUTO: 6.1 K/UL — SIGNIFICANT CHANGE UP (ref 1.8–7.4)
NEUTROPHILS NFR BLD AUTO: 82.3 % — HIGH (ref 43–77)
NRBC # BLD: 0 /100 WBCS — SIGNIFICANT CHANGE UP (ref 0–0)
PCO2 BLDV: 48 MMHG — SIGNIFICANT CHANGE UP (ref 41–51)
PH BLDV: 7.43 — SIGNIFICANT CHANGE UP (ref 7.32–7.43)
PLATELET # BLD AUTO: 143 K/UL — LOW (ref 150–400)
PO2 BLDV: 22 MMHG — SIGNIFICANT CHANGE UP
POTASSIUM SERPL-MCNC: 4 MMOL/L — SIGNIFICANT CHANGE UP (ref 3.5–5.3)
POTASSIUM SERPL-SCNC: 4 MMOL/L — SIGNIFICANT CHANGE UP (ref 3.5–5.3)
PROT SERPL-MCNC: 8.4 G/DL — HIGH (ref 6–8.3)
PROTHROM AB SERPL-ACNC: 12 SEC — SIGNIFICANT CHANGE UP (ref 10–12.9)
RBC # BLD: 3.63 M/UL — LOW (ref 4.2–5.8)
RBC # FLD: 14.7 % — HIGH (ref 10.3–14.5)
SAO2 % BLDV: 25 % — SIGNIFICANT CHANGE UP
SODIUM SERPL-SCNC: 139 MMOL/L — SIGNIFICANT CHANGE UP (ref 135–145)
WBC # BLD: 7.41 K/UL — SIGNIFICANT CHANGE UP (ref 3.8–10.5)
WBC # FLD AUTO: 7.41 K/UL — SIGNIFICANT CHANGE UP (ref 3.8–10.5)

## 2019-08-08 PROCEDURE — 71045 X-RAY EXAM CHEST 1 VIEW: CPT | Mod: 26

## 2019-08-08 RX ORDER — ACETAMINOPHEN 500 MG
650 TABLET ORAL ONCE
Refills: 0 | Status: COMPLETED | OUTPATIENT
Start: 2019-08-08 | End: 2019-08-08

## 2019-08-08 RX ORDER — VANCOMYCIN HCL 1 G
1250 VIAL (EA) INTRAVENOUS ONCE
Refills: 0 | Status: COMPLETED | OUTPATIENT
Start: 2019-08-08 | End: 2019-08-09

## 2019-08-08 RX ADMIN — Medication 650 MILLIGRAM(S): at 22:51

## 2019-08-08 RX ADMIN — Medication 650 MILLIGRAM(S): at 23:44

## 2019-08-08 NOTE — CONSULT NOTE ADULT - SUBJECTIVE AND OBJECTIVE BOX
Vascular Attending:  Ziyad    HPI: 49 y/o M with PMHx of ESRD on dialysis presents to [facility] with SOB. Pt s/p Declot of LUE AVF yesterday performed by Dr. Cardona, pt states that he felt slightly SOB after procedure, but worsened today after completing dialysis at 5PM.  Pt also states he has chest discomfort upon breathing.  Pt has low grade fever in ED of 100.7. Denies cough, nausea, vomiting, diarrhea, or any other complaints.    PAST MEDICAL & SURGICAL HISTORY:  ESRD (end stage renal disease) on dialysis  AV fistula    REVIEW OF SYSTEMS    MEDICATIONS  (STANDING):  heparin  Infusion.  Unit(s)/Hr (17 mL/Hr) IV Continuous <Continuous>  morphine  - Injectable 4 milliGRAM(s) IV Push once    Allergies  No Known Allergies    Vital Signs Last 24 Hrs  T(C): 37.8 (09 Aug 2019 00:31), Max: 38.2 (08 Aug 2019 23:00)  T(F): 100 (09 Aug 2019 00:31), Max: 100.7 (08 Aug 2019 23:00)  HR: 93 (09 Aug 2019 00:31) (93 - 102)  BP: 124/64 (09 Aug 2019 00:31) (118/65 - 137/82)  BP(mean): --  RR: 18 (09 Aug 2019 00:31) (18 - 20)  SpO2: 97% (09 Aug 2019 00:31) (92% - 97%)    PHYSICAL EXAM:    Constitutional: Pt AXOX3 in mild respiratory distress (not making full sentences)  Respiratory: labored but O2sat @96% on RA   Cardiovascular: S1S2  Extremities: LUE swollen but per pt that is his baseline  Vascular: graft pulsitile   Neurological: intact    LABS:                        10.4   7.41  )-----------( 143      ( 08 Aug 2019 20:36 )             31.3     08-08    139  |  97  |  47<H>  ----------------------------<  152<H>  4.0   |  27  |  6.81<H>    Ca    8.7      08 Aug 2019 20:36    TPro  8.4<H>  /  Alb  3.7  /  TBili  0.4  /  DBili  x   /  AST  26  /  ALT  24  /  AlkPhos  74  08-08    PT/INR - ( 08 Aug 2019 20:36 )   PT: 12.0 sec;   INR: 1.06          PTT - ( 08 Aug 2019 20:36 )  PTT:24.6 sec      RADIOLOGY & ADDITIONAL STUDIES

## 2019-08-08 NOTE — ED PROVIDER NOTE - MUSCULOSKELETAL, MLM
Spine appears normal, range of motion is not limited, no muscle or joint tenderness. No pitting edema. Spine appears normal, range of motion is not limited, no muscle or joint tenderness. No pitting edema. left upper arm + thrill , no erythema, purulent drainage, + warm to touch, + arm swelling

## 2019-08-08 NOTE — ED ADULT NURSE NOTE - CHIEF COMPLAINT QUOTE
Presents to ED for SOB which began today after having HD.  States he was seen at [facility] yesterday to have procedure for clot to be removed from AV fistula.

## 2019-08-08 NOTE — ED ADULT TRIAGE NOTE - CHIEF COMPLAINT QUOTE
Presents to ED for SOB which began today after having HD.  States he was seen at [facility] yesterday to have procedure for clot to be removed from AV fistula.

## 2019-08-08 NOTE — ED PROVIDER NOTE - CARE PLAN
Principal Discharge DX:	Pulmonary embolism  Secondary Diagnosis:	Renal failure  Secondary Diagnosis:	Dialysis patient

## 2019-08-08 NOTE — ED PROVIDER NOTE - OBJECTIVE STATEMENT
49 y/o M with PMHx of ESRD on dialysis presenting to ED s/p removal of thrombus yesterday in right arm, performed by Dr. Adrian, with SOB. States that he felt slightly SOB after the completion of the operation, but worsened today after completing dialysis at 5PM. Also states he has chest pain upon breathing. Has fever in ED. Denies nausea, vomiting, diarrhea, or any other acute complaints. 47 y/o M with PMHx of ESRD on dialysis presenting to ED s/p removal of thrombus yesterday in right arm, performed by Dr. Adrian, with SOB. States that he felt slightly SOB after the completion of the operation, but worsened today after completing dialysis at 5PM. Also states he has chest discomfort upon breathing. Has low grade fever in ED of 100.7. Denies nausea, vomiting, diarrhea, or any other acute complaints. 49 y/o M with PMHx of ESRD on dialysis presenting to ED s/p removal of thrombus yesterday in right arm, performed by Dr. Cardona, with SOB. States that he felt slightly SOB after the completion of the operation, but worsened today after completing dialysis at 5PM. Also states he has chest discomfort upon breathing. Has low grade fever in ED of 100.7. Denies nausea, vomiting, diarrhea, or any other acute complaints. 47 y/o M with PMHx of ESRD on dialysis presenting to ED s/p removal of thrombus yesterday in right arm, performed by Dr. Cardona from vascular c/o SOB. States that he felt slightly SOB after the completion of the operation, but worsened today after completing dialysis at 5PM. Also states he has chest discomfort upon breathing. Has low grade fever in ED of 100.7. Denies nausea, vomiting, diarrhea, or any other acute complaints.

## 2019-08-08 NOTE — CONSULT NOTE ADULT - ASSESSMENT
49 y/o M with PMHx of ESRD on dialysis presents to [facility] with SOB. Pt s/p Declot of LUE AVF yesterday performed by Dr. Cardona.     -Recommendation is CT chest w/ PE protocol  -Spoke with Renal (Dr Mendenhall) who cleared pt for study and will Dialyze again in am    - Addendum: CT positive for Bilateral segmental PE  with borderline RHS and B/L lower lobe opacities  - Pt is admitted to medicine  - Recommend Echo   -HepGtt w bolus  - Will cont to follow 49 y/o M with PMHx of ESRD on dialysis presents to [facility] with SOB. Pt s/p Declot of LUE AVF yesterday performed by Dr. Cardona.     -Recommendation is CT chest w/ PE protocol  -Spoke with Renal (Dr Mendenhall) who cleared pt for study and will Dialyze again in am    - Addendum: CT positive for Bilateral segmental PE  with borderline RHS and B/L lower lobe opacities  - Pt is admitted to medicine  - Recommend Echo   -HepGtt w bolus  - Will cont to follow        Vasc surg chief addendum: bilateral segmental PE (submassive) after open LUE fistula thrombectomy on Tuesday. Initially tachycardic, better now, also tachypneic. Temp probably from pulmonary infarct. Recommend heparin drip w/ bolus, trop, BNP, echo, f/u pulm/PERT recs. LUE fistula with good thrill, wound c/d/i, sutures in place clean with chloroprep daily. Okay to use with HD above incision. d/w attg Dr. Lackey.

## 2019-08-08 NOTE — ED PROVIDER NOTE - SKIN, MLM
Skin normal color for race, warm, dry and intact. No evidence of rash. Baseline left arm swelling. Incision site warm to touch, no redness, no purulent drainage. Tender to touch.

## 2019-08-08 NOTE — ED ADULT NURSE NOTE - OBJECTIVE STATEMENT
s/p HD today from [facility] dialysis center, sob exacerbated by movement

## 2019-08-08 NOTE — ED PROVIDER NOTE - CLINICAL SUMMARY MEDICAL DECISION MAKING FREE TEXT BOX
Patient s/p thrombus removal from shunt yesterday with sob and low grade temp. + trop + BNP , ICU was consulted. Patient with stable v/s was also covered for possible infection. Labs and culture done

## 2019-08-08 NOTE — ED PROVIDER NOTE - ATTENDING CONTRIBUTION TO CARE
I have seen the pt, reviewed all pertinent clinical data, and I agree with the documentation/care/plan executed by MARYAN Linton.

## 2019-08-09 ENCOUNTER — INPATIENT (INPATIENT)
Facility: HOSPITAL | Age: 49
LOS: 7 days | Discharge: HOME | DRG: 314 | End: 2019-08-17
Attending: STUDENT IN AN ORGANIZED HEALTH CARE EDUCATION/TRAINING PROGRAM | Admitting: STUDENT IN AN ORGANIZED HEALTH CARE EDUCATION/TRAINING PROGRAM
Payer: COMMERCIAL

## 2019-08-09 DIAGNOSIS — I77.0 ARTERIOVENOUS FISTULA, ACQUIRED: Chronic | ICD-10-CM

## 2019-08-09 DIAGNOSIS — I26.99 OTHER PULMONARY EMBOLISM WITHOUT ACUTE COR PULMONALE: ICD-10-CM

## 2019-08-09 LAB
ANION GAP SERPL CALC-SCNC: 15 MMOL/L — SIGNIFICANT CHANGE UP (ref 5–17)
APTT BLD: 48.3 SEC — HIGH (ref 27.5–36.3)
APTT BLD: 83.8 SEC — HIGH (ref 27.5–36.3)
APTT BLD: 92.7 SEC — HIGH (ref 27.5–36.3)
BASE EXCESS BLDA CALC-SCNC: 2.6 MMOL/L — SIGNIFICANT CHANGE UP (ref -2–3)
BUN SERPL-MCNC: 52 MG/DL — HIGH (ref 7–23)
CALCIUM SERPL-MCNC: 8.4 MG/DL — SIGNIFICANT CHANGE UP (ref 8.4–10.5)
CHLORIDE SERPL-SCNC: 98 MMOL/L — SIGNIFICANT CHANGE UP (ref 96–108)
CK MB CFR SERPL CALC: 1.1 NG/ML — SIGNIFICANT CHANGE UP (ref 0–6.7)
CK SERPL-CCNC: 262 U/L — HIGH (ref 30–200)
CO2 SERPL-SCNC: 25 MMOL/L — SIGNIFICANT CHANGE UP (ref 22–31)
CREAT SERPL-MCNC: 7.76 MG/DL — HIGH (ref 0.5–1.3)
GLUCOSE SERPL-MCNC: 102 MG/DL — HIGH (ref 70–99)
HCO3 BLDA-SCNC: 27 MMOL/L — SIGNIFICANT CHANGE UP (ref 21–28)
HCT VFR BLD CALC: 30.9 % — LOW (ref 39–50)
HCT VFR BLD CALC: 32.1 % — LOW (ref 39–50)
HGB BLD-MCNC: 10 G/DL — LOW (ref 13–17)
HGB BLD-MCNC: 10.7 G/DL — LOW (ref 13–17)
MAGNESIUM SERPL-MCNC: 1.9 MG/DL — SIGNIFICANT CHANGE UP (ref 1.6–2.6)
MCHC RBC-ENTMCNC: 28.5 PG — SIGNIFICANT CHANGE UP (ref 27–34)
MCHC RBC-ENTMCNC: 28.8 PG — SIGNIFICANT CHANGE UP (ref 27–34)
MCHC RBC-ENTMCNC: 32.4 GM/DL — SIGNIFICANT CHANGE UP (ref 32–36)
MCHC RBC-ENTMCNC: 33.3 GM/DL — SIGNIFICANT CHANGE UP (ref 32–36)
MCV RBC AUTO: 86.3 FL — SIGNIFICANT CHANGE UP (ref 80–100)
MCV RBC AUTO: 88 FL — SIGNIFICANT CHANGE UP (ref 80–100)
NRBC # BLD: 0 /100 WBCS — SIGNIFICANT CHANGE UP (ref 0–0)
NRBC # BLD: 0 /100 WBCS — SIGNIFICANT CHANGE UP (ref 0–0)
PCO2 BLDA: 39 MMHG — SIGNIFICANT CHANGE UP (ref 35–48)
PH BLDA: 7.46 — HIGH (ref 7.35–7.45)
PLATELET # BLD AUTO: 139 K/UL — LOW (ref 150–400)
PLATELET # BLD AUTO: 151 K/UL — SIGNIFICANT CHANGE UP (ref 150–400)
PO2 BLDA: 166 MMHG — HIGH (ref 83–108)
POTASSIUM SERPL-MCNC: 4.3 MMOL/L — SIGNIFICANT CHANGE UP (ref 3.5–5.3)
POTASSIUM SERPL-SCNC: 4.3 MMOL/L — SIGNIFICANT CHANGE UP (ref 3.5–5.3)
RBC # BLD: 3.51 M/UL — LOW (ref 4.2–5.8)
RBC # BLD: 3.72 M/UL — LOW (ref 4.2–5.8)
RBC # FLD: 14.6 % — HIGH (ref 10.3–14.5)
RBC # FLD: 14.6 % — HIGH (ref 10.3–14.5)
SAO2 % BLDA: 99 % — SIGNIFICANT CHANGE UP (ref 95–100)
SODIUM SERPL-SCNC: 138 MMOL/L — SIGNIFICANT CHANGE UP (ref 135–145)
SURGICAL PATHOLOGY STUDY: SIGNIFICANT CHANGE UP
TROPONIN T SERPL-MCNC: 0.09 NG/ML — CRITICAL HIGH (ref 0–0.01)
VANCOMYCIN FLD-MCNC: 12.9 UG/ML — SIGNIFICANT CHANGE UP
WBC # BLD: 7.71 K/UL — SIGNIFICANT CHANGE UP (ref 3.8–10.5)
WBC # BLD: 9.16 K/UL — SIGNIFICANT CHANGE UP (ref 3.8–10.5)
WBC # FLD AUTO: 7.71 K/UL — SIGNIFICANT CHANGE UP (ref 3.8–10.5)
WBC # FLD AUTO: 9.16 K/UL — SIGNIFICANT CHANGE UP (ref 3.8–10.5)

## 2019-08-09 PROCEDURE — 71275 CT ANGIOGRAPHY CHEST: CPT | Mod: 26

## 2019-08-09 PROCEDURE — 99291 CRITICAL CARE FIRST HOUR: CPT

## 2019-08-09 PROCEDURE — 99232 SBSQ HOSP IP/OBS MODERATE 35: CPT | Mod: GC

## 2019-08-09 PROCEDURE — 90935 HEMODIALYSIS ONE EVALUATION: CPT | Mod: GC

## 2019-08-09 PROCEDURE — 93308 TTE F-UP OR LMTD: CPT | Mod: 26

## 2019-08-09 PROCEDURE — 93010 ELECTROCARDIOGRAM REPORT: CPT

## 2019-08-09 PROCEDURE — 93321 DOPPLER ECHO F-UP/LMTD STD: CPT | Mod: 26

## 2019-08-09 RX ORDER — PIPERACILLIN AND TAZOBACTAM 4; .5 G/20ML; G/20ML
2.25 INJECTION, POWDER, LYOPHILIZED, FOR SOLUTION INTRAVENOUS EVERY 8 HOURS
Refills: 0 | Status: DISCONTINUED | OUTPATIENT
Start: 2019-08-09 | End: 2019-08-09

## 2019-08-09 RX ORDER — HEPARIN SODIUM 5000 [USP'U]/ML
INJECTION INTRAVENOUS; SUBCUTANEOUS
Qty: 25000 | Refills: 0 | Status: DISCONTINUED | OUTPATIENT
Start: 2019-08-09 | End: 2019-08-09

## 2019-08-09 RX ORDER — ACETAMINOPHEN 500 MG
650 TABLET ORAL EVERY 6 HOURS
Refills: 0 | Status: DISCONTINUED | OUTPATIENT
Start: 2019-08-09 | End: 2019-08-17

## 2019-08-09 RX ORDER — CALCIUM ACETATE 667 MG
3 TABLET ORAL
Qty: 0 | Refills: 0 | DISCHARGE

## 2019-08-09 RX ORDER — HYDROMORPHONE HYDROCHLORIDE 2 MG/ML
1 INJECTION INTRAMUSCULAR; INTRAVENOUS; SUBCUTANEOUS EVERY 4 HOURS
Refills: 0 | Status: DISCONTINUED | OUTPATIENT
Start: 2019-08-09 | End: 2019-08-11

## 2019-08-09 RX ORDER — CHOLECALCIFEROL (VITAMIN D3) 125 MCG
0 CAPSULE ORAL
Qty: 0 | Refills: 0 | DISCHARGE

## 2019-08-09 RX ORDER — HEPARIN SODIUM 5000 [USP'U]/ML
7500 INJECTION INTRAVENOUS; SUBCUTANEOUS ONCE
Refills: 0 | Status: COMPLETED | OUTPATIENT
Start: 2019-08-09 | End: 2019-08-09

## 2019-08-09 RX ORDER — HYDROMORPHONE HYDROCHLORIDE 2 MG/ML
0.5 INJECTION INTRAMUSCULAR; INTRAVENOUS; SUBCUTANEOUS ONCE
Refills: 0 | Status: DISCONTINUED | OUTPATIENT
Start: 2019-08-09 | End: 2019-08-09

## 2019-08-09 RX ORDER — HEPARIN SODIUM 5000 [USP'U]/ML
1600 INJECTION INTRAVENOUS; SUBCUTANEOUS
Qty: 25000 | Refills: 0 | Status: DISCONTINUED | OUTPATIENT
Start: 2019-08-09 | End: 2019-08-09

## 2019-08-09 RX ORDER — HEPARIN SODIUM 5000 [USP'U]/ML
1600 INJECTION INTRAVENOUS; SUBCUTANEOUS
Qty: 25000 | Refills: 0 | Status: DISCONTINUED | OUTPATIENT
Start: 2019-08-09 | End: 2019-08-10

## 2019-08-09 RX ORDER — ACETAMINOPHEN 500 MG
2 TABLET ORAL
Qty: 0 | Refills: 0 | DISCHARGE

## 2019-08-09 RX ORDER — CHLORHEXIDINE GLUCONATE 213 G/1000ML
1 SOLUTION TOPICAL
Refills: 0 | Status: DISCONTINUED | OUTPATIENT
Start: 2019-08-09 | End: 2019-08-12

## 2019-08-09 RX ORDER — MORPHINE SULFATE 50 MG/1
4 CAPSULE, EXTENDED RELEASE ORAL ONCE
Refills: 0 | Status: DISCONTINUED | OUTPATIENT
Start: 2019-08-09 | End: 2019-08-09

## 2019-08-09 RX ORDER — VANCOMYCIN HCL 1 G
1250 VIAL (EA) INTRAVENOUS ONCE
Refills: 0 | Status: COMPLETED | OUTPATIENT
Start: 2019-08-09 | End: 2019-08-09

## 2019-08-09 RX ADMIN — HEPARIN SODIUM 7500 UNIT(S): 5000 INJECTION INTRAVENOUS; SUBCUTANEOUS at 02:37

## 2019-08-09 RX ADMIN — HYDROMORPHONE HYDROCHLORIDE 0.5 MILLIGRAM(S): 2 INJECTION INTRAMUSCULAR; INTRAVENOUS; SUBCUTANEOUS at 10:50

## 2019-08-09 RX ADMIN — HEPARIN SODIUM 1700 UNIT(S)/HR: 5000 INJECTION INTRAVENOUS; SUBCUTANEOUS at 02:46

## 2019-08-09 RX ADMIN — Medication 166.67 MILLIGRAM(S): at 21:30

## 2019-08-09 RX ADMIN — HYDROMORPHONE HYDROCHLORIDE 0.5 MILLIGRAM(S): 2 INJECTION INTRAMUSCULAR; INTRAVENOUS; SUBCUTANEOUS at 10:58

## 2019-08-09 RX ADMIN — Medication 250 MILLIGRAM(S): at 01:57

## 2019-08-09 RX ADMIN — PIPERACILLIN AND TAZOBACTAM 200 GRAM(S): 4; .5 INJECTION, POWDER, LYOPHILIZED, FOR SOLUTION INTRAVENOUS at 07:09

## 2019-08-09 NOTE — DIETITIAN INITIAL EVALUATION ADULT. - NAME AND PHONE
Samantha Gitlin, RD, CDN, [facility], [phone] or available on [facility]

## 2019-08-09 NOTE — PROGRESS NOTE ADULT - ASSESSMENT
47 y/o M with PMHx of ESRD on dialysis presents to [facility] with SOB. Pt s/p LUE AVF thrombectomy .    - Continue Heparin GTT.   - Plan discussed with chief resident.

## 2019-08-09 NOTE — PROGRESS NOTE ADULT - SUBJECTIVE AND OBJECTIVE BOX
SUBJECTIVE:     On heparin GTT. In BiPAP, vitals stable.    heparin  Infusion 1600 Unit(s)/Hr IV Continuous <Continuous>      Vital Signs Last 24 Hrs  T(C): 36.9 (09 Aug 2019 15:05), Max: 38.2 (08 Aug 2019 23:00)  T(F): 98.4 (09 Aug 2019 15:05), Max: 100.7 (08 Aug 2019 23:00)  HR: 74 (09 Aug 2019 16:00) (70 - 102)  BP: 128/73 (09 Aug 2019 16:00) (115/58 - 176/78)  BP(mean): 89 (09 Aug 2019 16:00) (76 - 117)  RR: 12 (09 Aug 2019 16:00) (11 - 38)  SpO2: 100% (09 Aug 2019 16:00) (92% - 100%)  I&O's Detail    08 Aug 2019 07:01  -  09 Aug 2019 07:00  --------------------------------------------------------  IN:    heparin  Infusion.: 16 mL  Total IN: 16 mL    OUT:    Voided: 300 mL  Total OUT: 300 mL    Total NET: -284 mL      09 Aug 2019 07:01  -  09 Aug 2019 17:11  --------------------------------------------------------  IN:    heparin  Infusion.: 16 mL    heparin Infusion: 128 mL  Total IN: 144 mL    OUT:    Other: 2000 mL  Total OUT: 2000 mL    Total NET: -1856 mL          General: NAD, resting comfortably in bed  C/V: NSR  Pulm: Nonlabored breathing, no respiratory distress on BiPAP  Abd: nondistended, soft, nontender.  Extrem: 1+ b/l LE edema    LABS:                        10.7   9.16  )-----------( 151      ( 09 Aug 2019 15:18 )             32.1     08-09    138  |  98  |  52<H>  ----------------------------<  102<H>  4.3   |  25  |  7.76<H>    Ca    8.4      09 Aug 2019 06:04  Mg     1.9     08-09    TPro  8.4<H>  /  Alb  3.7  /  TBili  0.4  /  DBili  x   /  AST  26  /  ALT  24  /  AlkPhos  74  08-08    PT/INR - ( 08 Aug 2019 20:36 )   PT: 12.0 sec;   INR: 1.06          PTT - ( 09 Aug 2019 15:18 )  PTT:83.8 sec

## 2019-08-09 NOTE — H&P ADULT - HISTORY OF PRESENT ILLNESS
Patient is a 47 yo M w/ESRD on HD T, Th, Sat via left sided AV fistula presenting complaining of shortness of breath, fevers, chills at dialysis. Patient recently admitted on vascular service and underwent removal of thrombus in AVF on 8/6/19. Patient underwent HD on 8/8 via left sided fistula and became acutely SOB w/fevers and chills. Patient denies cough, rhinorrhea, dysuria (makes urine), diarrhea, sick contacts, recent travel. He received his full HD before being sent to ED.     ED course: Tm 100.7F, HR , -137/64-82, RR 18-20, O2% 94 RA -> 97% NC.  Labs notable for WBC 7.4 w/82.3% N, Hb 10.4 from 11.7, Platelets 143 (stable). Lactate 1.3, BMP with elevated creatinine 6.81 c/w ESRD, trop elevated 0.11, BNP 19,361. EKG sinus tach w/prolonged QTc 513.     CT PE showed bilateral PE, right heart strain. Bedside echo done overnight by cardio fellow showed interventricular septal bounce, RV dysfunction with hypokinetic RV apex.     Patient given vancomycin 1250 mg, tylenol, morphine ordered but not given. ICU consulted for dispo in the setting of PE and concerning respiratory status and patient admitted to MICU for further monitoring .

## 2019-08-09 NOTE — H&P ADULT - ASSESSMENT
47 yo M w/ESRD on HD T, Th, Sat via left sided AV fistula presenting complaining of shortness of breath, fevers, chills at dialysis. Found to have b/l PE with pulmonary infarcts and right heart strain. Patient also with severe sepsis likely 2/2 to HD fistula infection and resulting multifactorial respiratory compromise. Admitted to ICU for further monitoring.     PULMONARY  #Respiratory failure- patient with tachypnea RR >30 however no hypoxia on 3 L NC. Clot burden with PE is not so extensive to cause this degree of tachypnea. Likely multifactorial in the setting of sepsis as well as PE.  - patient upgraded to BiPAP from NC to help with work of breathing   - will require monitored setting, MICU  - treatment of PE and sepsis as below    #Submassive PE- Patient with normal BP, bilateral PE w/evidence of right heart strain.   - anticoagulation with heparin gtt for now while resp status is tenuous  - source likely LUE fistula in the setting of recent known clot and surgical removal, onset of symptoms after HD  - continue to monitor VS in MICU  - PERT team activated, no indication for catheter directed tPA at the moment     ID  #Severe sepsis- Meets SIRS criteria by HR, RR and Temp. Patient with no localizing symptoms such as cough, dysuria, rhinorrhea, headache. Since fever onset during dialysis suspect infection of fistula s/p recent instrumentation.   - s/p vanc 1250 mg IV X1 in ED, zosyn  - BCX2, UCx sent in ED, f/u results  - will c/w vanc dosed with HD for now, will D/C zosyn since source suspected skin and soft tissue s/p instrumentation  - vascular following for recommendations regarding fistula, f/u recs     CARDIOVASCULAR  #Right heart strain- In the setting of PE as above  - f/u official echo  - c/w treatment of PE as above    RENAL  #HD dependent- s/p HD (full session) on 8/8/19. Patient still makes some urine  - contact renal for HD again today 8/9/19 in the setting of contrast load   - holding home calcium acetate for now, re-start when patient can take PO meds     GI, ENDO, NEURO- No active issues     F: None  E: replete cautiously in the setting of ESRD  N: NPO for now due to tenuous respiratory status  GI PPX: None   VTE PPX: Therapeutic AC in the setting of PE as above  FULL CODE   DISPO: MICU

## 2019-08-09 NOTE — CONSULT NOTE ADULT - ASSESSMENT
47 yo M w PMHx of ESRD on HD T, Th, Sat via left sided AV fistula presenting complaining of shortness of breath, fevers, chills at dialysis. Found to have b/l PE with pulmonary infarcts and right heart strain. Patient also with severe sepsis likely 2/2 to HD fistula infection and resulting multifactorial respiratory compromise. Admitted to ICU for further monitoring.     # ESRD on HD TTS via LUE AVF   - last HD on 8/8  - volume status and electrolytes noted, chest CT/ CXR reviewed  - plan for HD today for 180 min with UF goal of 2.0 L  - EDW not available, pt is obtunded  - obtain flow sheets from outpatient unit  - daily weights  - renal diet  - strict I/O    # HTN  - UF with HD    # Sepsis  - AVF as possible source, follow Vascular evaluation/ recommendations  - follow cultures  - renal dosing of antibiotics    # Anemia  - Hb 10.0 g/dl, noted  - transfusion as per primary team

## 2019-08-09 NOTE — DIETITIAN INITIAL EVALUATION ADULT. - OTHER INFO
47 yo/male with PMHx ESRD on HD, s/p declotting of LUE AVF on 8/8, readmitted w/SOB and CP. CT showing B/L segmental PE w/borderline R. heart strain and B/L LL opacities; likely AVF source of clot. Pt placed on BiPAP mask, slightly lethargic this AM. Seen in room and discussed during MICU rounds. Pt experiencing some substernal chest pain that was inhibiting his ability to take deep breaths. Given 1mg dilaudid w/good effect. Unable to obtain much specific information at time of visit 2/2 BiPAP, but pt able to nod yes/no. Endorses eating well at home PTA and following w/RD at outpatient HD clinic. Currently w/diet ordered but 0% intake; pt denies hunger. No N/V/C/D reported at this time. NKFA. Denies difficulty chewing or swallowing. Skin noted with LUE AVF. Still endorsing some mild chest pain. Planned for HD today. Renal/DASH diet handouts left at bedside.

## 2019-08-09 NOTE — PROGRESS NOTE ADULT - SUBJECTIVE AND OBJECTIVE BOX
INTERVAL HPI/OVERNIGHT EVENTS:    SUBJECTIVE: Patient seen and examined at bedside.    OBJECTIVE:    VITAL SIGNS:  ICU Vital Signs Last 24 Hrs  T(C): 37.4 (09 Aug 2019 10:36), Max: 38.2 (08 Aug 2019 23:00)  T(F): 99.3 (09 Aug 2019 10:36), Max: 100.7 (08 Aug 2019 23:00)  HR: 90 (09 Aug 2019 11:00) (80 - 102)  BP: 128/78 (09 Aug 2019 11:00) (118/65 - 176/78)  BP(mean): 94 (09 Aug 2019 11:00) (89 - 117)  ABP: --  ABP(mean): --  RR: 31 (09 Aug 2019 11:00) (18 - 38)  SpO2: 95% (09 Aug 2019 11:00) (92% - 99%)        08-08 @ 07:01  -  08-09 @ 07:00  --------------------------------------------------------  IN: 16 mL / OUT: 300 mL / NET: -284 mL    08-09 @ 07:01  -  08-09 @ 11:36  --------------------------------------------------------  IN: 48 mL / OUT: 0 mL / NET: 48 mL      CAPILLARY BLOOD GLUCOSE          PHYSICAL EXAM:    General: NAD  HEENT: NC/AT; PERRL, clear conjunctiva  Neck: supple  Respiratory: CTA b/l  Cardiovascular: +S1/S2; RRR  Abdomen: soft, NT/ND; +BS x4  Extremities: WWP, 2+ peripheral pulses b/l; no LE edema  Skin: normal color and turgor; no rash  Neurological:     MEDICATIONS:  MEDICATIONS  (STANDING):  chlorhexidine 2% Cloths 1 Application(s) Topical <User Schedule>  heparin  Infusion 1600 Unit(s)/Hr (16 mL/Hr) IV Continuous <Continuous>  piperacillin/tazobactam IVPB.. 2.25 Gram(s) IV Intermittent every 8 hours    MEDICATIONS  (PRN):  acetaminophen   Tablet .. 650 milliGRAM(s) Oral every 6 hours PRN Temp greater or equal to 38C (100.4F), Mild Pain (1 - 3)  HYDROmorphone  Injectable 1 milliGRAM(s) IV Push every 4 hours PRN Severe Pain (7 - 10)      ALLERGIES:  Allergies    No Known Allergies    Intolerances        LABS:                        10.0   7.71  )-----------( 139      ( 09 Aug 2019 06:04 )             30.9     08-09    138  |  98  |  52<H>  ----------------------------<  102<H>  4.3   |  25  |  7.76<H>    Ca    8.4      09 Aug 2019 06:04  Mg     1.9     08-09    TPro  8.4<H>  /  Alb  3.7  /  TBili  0.4  /  DBili  x   /  AST  26  /  ALT  24  /  AlkPhos  74  08-08    PT/INR - ( 08 Aug 2019 20:36 )   PT: 12.0 sec;   INR: 1.06          PTT - ( 09 Aug 2019 06:04 )  PTT:92.7 sec      RADIOLOGY & ADDITIONAL TESTS: Reviewed. INTERVAL HPI/OVERNIGHT EVENTS: Admitted early this AM see H+P    SUBJECTIVE: Patient seen and examined at bedside. Breathing tachypneic on bipap. c/o midsternal chest pain which he reports is making it more difficult for him to breathe. After giving 1mg Dilaudid push pt was breathing more comfortably on RA however still satting in high 80s low 90s off bipap.    OBJECTIVE:    VITAL SIGNS:  ICU Vital Signs Last 24 Hrs  T(C): 37.4 (09 Aug 2019 10:36), Max: 38.2 (08 Aug 2019 23:00)  T(F): 99.3 (09 Aug 2019 10:36), Max: 100.7 (08 Aug 2019 23:00)  HR: 90 (09 Aug 2019 11:00) (80 - 102)  BP: 128/78 (09 Aug 2019 11:00) (118/65 - 176/78)  BP(mean): 94 (09 Aug 2019 11:00) (89 - 117)  ABP: --  ABP(mean): --  RR: 31 (09 Aug 2019 11:00) (18 - 38)  SpO2: 95% (09 Aug 2019 11:00) (92% - 99%)        08-08 @ 07:01  -  08-09 @ 07:00  --------------------------------------------------------  IN: 16 mL / OUT: 300 mL / NET: -284 mL    08-09 @ 07:01  -  08-09 @ 11:36  --------------------------------------------------------  IN: 48 mL / OUT: 0 mL / NET: 48 mL      CAPILLARY BLOOD GLUCOSE          PHYSICAL EXAM:    General: NAD, tachypneic on bipap  HEENT: NC/AT; PERRL, clear conjunctiva  Neck: supple, no JVD  Respiratory: Bibasilar crackles b/l  Cardiovascular: +S1/S2; RRR no M/R/G  Abdomen: soft, NT/ND; +BS x4  Extremities: WWP, 2+ peripheral pulses b/l; no LE edema. LUE fistula in place which is TTP  Skin: normal color and turgor; no rash  Neurological: AAOx3    MEDICATIONS:  MEDICATIONS  (STANDING):  chlorhexidine 2% Cloths 1 Application(s) Topical <User Schedule>  heparin  Infusion 1600 Unit(s)/Hr (16 mL/Hr) IV Continuous <Continuous>  piperacillin/tazobactam IVPB.. 2.25 Gram(s) IV Intermittent every 8 hours    MEDICATIONS  (PRN):  acetaminophen   Tablet .. 650 milliGRAM(s) Oral every 6 hours PRN Temp greater or equal to 38C (100.4F), Mild Pain (1 - 3)  HYDROmorphone  Injectable 1 milliGRAM(s) IV Push every 4 hours PRN Severe Pain (7 - 10)      ALLERGIES:  Allergies    No Known Allergies    Intolerances        LABS:                        10.0   7.71  )-----------( 139      ( 09 Aug 2019 06:04 )             30.9     08-09    138  |  98  |  52<H>  ----------------------------<  102<H>  4.3   |  25  |  7.76<H>    Ca    8.4      09 Aug 2019 06:04  Mg     1.9     08-09    TPro  8.4<H>  /  Alb  3.7  /  TBili  0.4  /  DBili  x   /  AST  26  /  ALT  24  /  AlkPhos  74  08-08    PT/INR - ( 08 Aug 2019 20:36 )   PT: 12.0 sec;   INR: 1.06          PTT - ( 09 Aug 2019 06:04 )  PTT:92.7 sec      RADIOLOGY & ADDITIONAL TESTS: Reviewed.

## 2019-08-09 NOTE — CONSULT NOTE ADULT - ASSESSMENT
ASSESSMENT AND PLAN:  47 yo M w/ESRD on HD T, Th, Sat via left sided AV fistula presenting complaining of shortness of breath, fevers, chills at dialysis. Found to have b/l PE with pulmonary infarcts and right heart strain. Patient also with severe sepsis likely 2/2 to HD fistula infection and resulting multifactorial respiratory compromise. ICU consulted for disposition.     PULMONARY  #Respiratory failure- patient with tachypnea RR >30 however no hypoxia on 3 L NC. Clot burden with PE is not so extensive to cause this degree of tachypnea. Likely multifactorial in the setting of sepsis as well as PE.  - patient upgraded to BiPAP from NC to help with work of breathing   - will require monitored setting, MICU  - treatment of PE and sepsis as below    #Submassive PE- Patient with normal BP, bilateral PE w/evidence of right heart strain.   - anticoagulation with heparin gtt for now while resp status is tenuous  - source likely LUE fistula in the setting of recent known clot and surgical removal, onset of symptoms after HD  - continue to monitor VS in MICU  - PERT team activated, no indication for catheter directed tPA at the moment     ID  #Sepsis- Patient with no localizing symptoms such as cough, dysuria, rhinorrhea, headache. Since fever onset during dialysis suspect infection of fistula s/p recent instrumentation.   - s/p vanc 1250 mg IV X1 in ED, zosyn  - BCX2, UCx sent in ED, f/u results  - will c/w vanc dosed with HD for now, will D/C zosyn since source suspected skin and soft tissue s/p instrumentation  - vascular following for recommendations regarding fistula, f/u recs     CARDIOVASCULAR  #Right heart strain- In the setting of PE as above  - f/u official echo  - c/w treatment of PE as above    RENAL  #HD dependent- s/p HD (full session) on 8/8/19. Patient still makes some urine  - contact renal for HD again today 8/9/19 in the setting of contrast load     GI, ENDO, NEURO- No active issues     F: None  E: replete cautiously in the setting of ESRD  N: NPO for now due to tenuous respiratory status  GI PPX: None   VTE PPX: Therapeutic AC in the setting of PE as above  FULL CODE   DISPO: MICU    Plan discussed with intensivist, Dr. Holguin.

## 2019-08-09 NOTE — DIETITIAN INITIAL EVALUATION ADULT. - ENERGY NEEDS
Height 69"; .6#; #; 127%IBW  BMI 29.9  Ideal body weight used for calculations as pt >120% of IBW. Needs increased for ESRD on HD. Fluids 1000mL +UOP or per team discretion

## 2019-08-09 NOTE — CONSULT NOTE ADULT - SUBJECTIVE AND OBJECTIVE BOX
Patient is a 47 yo Male who presents with a chief complaint of b/l PE with RHS on CT scan (09 Aug 2019 05:15)    Nephrology consult placed in regards to ESRD on HD.  ESRD on HD TTS via LUE AVF x 3 yrs.  Last HD on 8/8, full session.  Patient still makes some urine.    HPI:  Patient is a 47 yo M w/ESRD on HD T, Th, Sat via left sided AV fistula presenting complaining of shortness of breath, fevers, chills at dialysis. Patient recently admitted on vascular service and underwent removal of thrombus in AVF on 8/6/19. Patient underwent HD on 8/8 via left sided fistula and became acutely SOB w/fevers and chills. Patient denies cough, rhinorrhea, dysuria (makes urine), diarrhea, sick contacts, recent travel. He received his full HD before being sent to ED.     ED course: Tm 100.7F, HR , -137/64-82, RR 18-20, O2% 94 RA -> 97% NC.  Labs notable for WBC 7.4 w/82.3% N, Hb 10.4 from 11.7, Platelets 143 (stable). Lactate 1.3, BMP with elevated creatinine 6.81 c/w ESRD, trop elevated 0.11, BNP 19,361. EKG sinus tach w/prolonged QTc 513.     CT PE showed bilateral PE, right heart strain. Bedside echo done overnight by cardio fellow showed interventricular septal bounce, RV dysfunction with hypokinetic RV apex.     Patient given vancomycin 1250 mg, tylenol, morphine ordered but not given. ICU consulted for dispo in the setting of PE and concerning respiratory status and patient admitted to MICU for further monitoring . (09 Aug 2019 07:20)      PAST MEDICAL & SURGICAL HISTORY:  ESRD (end stage renal disease) on dialysis  AV fistula      Allergies    No Known Allergies    Intolerances        FAMILY HISTORY:  not able to obtain, obtunded      SOCIAL HISTORY:  not able to obtain, obtunded      MEDICATIONS  (STANDING):  chlorhexidine 2% Cloths 1 Application(s) Topical <User Schedule>  heparin  Infusion 1600 Unit(s)/Hr (16 mL/Hr) IV Continuous <Continuous>  piperacillin/tazobactam IVPB.. 2.25 Gram(s) IV Intermittent every 8 hours    MEDICATIONS  (PRN):  acetaminophen   Tablet .. 650 milliGRAM(s) Oral every 6 hours PRN Temp greater or equal to 38C (100.4F), Mild Pain (1 - 3)  HYDROmorphone  Injectable 1 milliGRAM(s) IV Push every 4 hours PRN Severe Pain (7 - 10)      Vital Signs Last 24 Hrs  T(C): 37.4 (09 Aug 2019 10:36), Max: 38.2 (08 Aug 2019 23:00)  T(F): 99.3 (09 Aug 2019 10:36), Max: 100.7 (08 Aug 2019 23:00)  HR: 95 (09 Aug 2019 10:39) (80 - 102)  BP: 136/70 (09 Aug 2019 10:00) (118/65 - 176/78)  BP(mean): 89 (09 Aug 2019 10:00) (89 - 117)  RR: 23 (09 Aug 2019 10:00) (18 - 38)  SpO2: 95% (09 Aug 2019 10:39) (92% - 99%)    REVIEW OF SYSTEMS:  not able to obtain, obtunded    PHYSICAL EXAM:  GENERAL: well-developed, well nourished, obtunded  NECK: supple, No JVD  CHEST/LUNG: decreased air entry bilaterally  HEART: normal S1S2, RRR  ABDOMEN: Soft, +BS, No flank tenderness  EXTREMITIES: LUE swelling, no clubbing, cyanosis, or edema, no calf tenderness bilateral  Neurology: obtunded, no focal neurological deficit  SKIN: No rashes  ACCESS: LUE AVF, good thrill and bruit appreciated      CAPILLARY BLOOD GLUCOSE          I&O's Summary    08 Aug 2019 07:01  -  09 Aug 2019 07:00  --------------------------------------------------------  IN: 16 mL / OUT: 300 mL / NET: -284 mL    09 Aug 2019 07:01  -  09 Aug 2019 11:23  --------------------------------------------------------  IN: 48 mL / OUT: 0 mL / NET: 48 mL          LABS:                                                   08-09-19 @ 06:04    138  |  98  |  52<H>  ----------------------------<  102<H>  4.3   |  25  |  7.76<H>                                                 08-08-19 @ 20:36    139  |  97  |  47<H>  ----------------------------<  152<H>  4.0   |  27  |  6.81<H>                                                 08-06-19 @ 15:00    139  |  104  |  68<H>  ----------------------------<  113<H>  3.9   |  19<L>  |  9.31<H>                                                 08-06-19 @ 02:47    139  |  101  |  62<H>  ----------------------------<  90  3.8   |  22  |  7.84<H>                                                 08-05-19 @ 18:48    140  |  106  |  100<H>  ----------------------------<  118<H>  5.4<H>   |  18<L>  |  11.44<H>                                                 08-05-19 @ 17:05    138  |  105  |  99<H>  ----------------------------<  58<L>  6.4<HH>   |  17<L>  |  11.47<H>    Ca    8.4      09 Aug 2019 06:04  Ca    8.7      08 Aug 2019 20:36  Ca    8.3<L>      06 Aug 2019 15:00  Ca    8.9      06 Aug 2019 02:47  Ca    9.1      05 Aug 2019 18:48  Ca    9.1      05 Aug 2019 17:05  Phos  4.6     08-06  Phos  3.1     08-06  Mg     1.9     08-09  Mg     1.9     08-06  Mg     1.9     08-06    TPro  8.4<H>  /  Alb  3.7  /  TBili  0.4  /  DBili  x   /  AST  26  /  ALT  24  /  AlkPhos  74  08-08  TPro  8.7<H>  /  Alb  4.0  /  TBili  0.5  /  DBili  x   /  AST  15  /  ALT  16  /  AlkPhos  84  08-05                          10.0   7.71  )-----------( 139      ( 09 Aug 2019 06:04 )             30.9     CBC Full  -  ( 09 Aug 2019 06:04 )  WBC Count : 7.71 K/uL  Hemoglobin : 10.0 g/dL  Hematocrit : 30.9 %  Platelet Count - Automated : 139 K/uL  Mean Cell Volume : 88.0 fl      CBC Full  -  ( 08 Aug 2019 20:36 )  WBC Count : 7.41 K/uL  Hemoglobin : 10.4 g/dL  Hematocrit : 31.3 %  Platelet Count - Automated : 143 K/uL  Mean Cell Volume : 86.2 fl      CBC Full  -  ( 06 Aug 2019 15:00 )  WBC Count : 10.02 K/uL  Hemoglobin : 11.7 g/dL  Hematocrit : 35.8 %  Platelet Count - Automated : 139 K/uL  Mean Cell Volume : 86.9 fl      CBC Full  -  ( 06 Aug 2019 13:20 )  WBC Count : 8.70 K/uL  Hemoglobin : 11.4 g/dL  Hematocrit : 35.5 %  Platelet Count - Automated : 175 K/uL  Mean Cell Volume : 87.4 fl      CBC Full  -  ( 06 Aug 2019 02:47 )  WBC Count : 7.21 K/uL  Hemoglobin : 11.5 g/dL  Hematocrit : 33.9 %  Platelet Count - Automated : 163 K/uL  Mean Cell Volume : 85.4 fl      CBC Full  -  ( 05 Aug 2019 17:05 )  WBC Count : 5.86 K/uL  Hemoglobin : 11.6 g/dL  Hematocrit : 36.3 %  Platelet Count - Automated : 144 K/uL  Mean Cell Volume : 88.3 fl        PT/INR - ( 08 Aug 2019 20:36 )   PT: 12.0 sec;   INR: 1.06          PTT - ( 09 Aug 2019 06:04 )  PTT:92.7 sec  CARDIAC MARKERS ( 09 Aug 2019 04:51 )  x     / 0.09 ng/mL / 262 U/L / x     / 1.1 ng/mL  CARDIAC MARKERS ( 08 Aug 2019 20:36 )  x     / 0.11 ng/mL / x     / x     / x              RADIOLOGY & ADDITIONAL TESTS:  < from: CT Angio Chest PE Protocol w/ IV Cont (08.09.19 @ 01:38) >    FINDINGS: CT angiography of the chest was performed with the   administration of intravenous contrast. Multiplanar and maximum intensity   projection 3D reconstructions were performed in the sagittal and coronal   planes. No prior studies are available for comparison. Examination is   limited secondary to motion.    Evaluation of the chest demonstrates the visualized thyroid gland is   normal in appearance. There is cardiomegaly. Bilateral gynecomastia.   Negative for thoracic inlet, mediastinal or hilar lymphadenopathy with   mild left axillary adenopathy with representative lymph nodes measuring   up to 1.4 cm with obliteration of central hilar fat. Evaluation of the   pulmonary vasculature demonstrates multiple occlusive pulmonary emboli   within segmental and subsegmental pulmonary arterial branches supplying   the left upper lobe, left lower lobe, right upper lobe and right lower   lobe. There is straightening of the intraventricular septum, consistent   with a component of right heart strain. Evaluation of the lung parenchyma   demonstrates pulmonary venous engorgement including extending to the   bilateral upper lobes. There are wedge-shaped regions of consolidative   opacity within the superior segment of the right lower lobe and confluent   consolidative opacity within the left lower lobe,likely regions of   pulmonary infarct. There is severe enlargement of main pulmonary artery   consistent with pulmonary arterial hypertension. Negative for   intraluminal thrombus within left atrial appendage. No endobronchial   lesion. Evaluation ofthe upper abdomen demonstrates a moderate hiatal   hernia. Bilateral renal atrophy with a cyst in the upper pole of the left   kidney measuring 1.9 cm. The hepatic artery arises directly from the   bilateral adrenal adenomatous hyperplasia. Circumferential mural   thickening of the esophagus, consistent with esophagitis. Stent within   the left axillary vein. Evaluation of the osseous structures demonstrates   diffuse sclerosis, suggestive of renal osteodystrophy.           IMPRESSION:    Bilateral occlusive segmental and subsegmental pulmonary emboli with   developing bilateral pulmonary infarcts; resultant right heart strain.    Severe distal esophagitis.    < end of copied text >        < from: Xray Chest 1 View- PORTABLE-Urgent (08.05.19 @ 19:48) >  Findings:    Support devices: Interval placement of a right IJ catheter with the tip   overlying the cavoatrial junction.    Cardiac/mediastinum/hilum: Unremarkable.    Lung parenchyma/Pleura: No focal parenchymal opacities, pleural effusion   or pneumothorax are present.    Skeleton/soft tissues: Unremarkable.    Impression:      No radiographic evidence of acute cardiopulmonary disease.    Right IJ catheter in appropriate position.    < end of copied text > Patient is a 49 yo Male who presents with a chief complaint of b/l PE with RHS on CT scan (09 Aug 2019 05:15)    Nephrology consult placed in regards to ESRD on HD.  ESRD on HD TTS via LUE AVF x 3 yrs.  Last HD on 8/8, full session.  Patient still makes some urine.    HPI:  Patient is a 49 yo M w/ESRD on HD T, Th, Sat via left sided AV fistula presenting complaining of shortness of breath, fevers, chills at dialysis. Patient recently admitted on vascular service and underwent removal of thrombus in AVF on 8/6/19. Patient underwent HD on 8/8 via left sided fistula and became acutely SOB w/fevers and chills. Patient denies cough, rhinorrhea, dysuria (makes urine), diarrhea, sick contacts, recent travel. He received his full HD before being sent to ED.     ED course: Tm 100.7F, HR , -137/64-82, RR 18-20, O2% 94 RA -> 97% NC.  Labs notable for WBC 7.4 w/82.3% N, Hb 10.4 from 11.7, Platelets 143 (stable). Lactate 1.3, BMP with elevated creatinine 6.81 c/w ESRD, trop elevated 0.11, BNP 19,361. EKG sinus tach w/prolonged QTc 513.     CT PE showed bilateral PE, right heart strain. Bedside echo done overnight by cardio fellow showed interventricular septal bounce, RV dysfunction with hypokinetic RV apex.     Patient given vancomycin 1250 mg, tylenol, morphine ordered but not given. ICU consulted for dispo in the setting of PE and concerning respiratory status and patient admitted to MICU for further monitoring . (09 Aug 2019 07:20)      PAST MEDICAL & SURGICAL HISTORY:  ESRD (end stage renal disease) on dialysis  AV fistula      Allergies    No Known Allergies    Intolerances        FAMILY HISTORY:  not able to obtain, obtunded      SOCIAL HISTORY:  not able to obtain, obtunded      MEDICATIONS  (STANDING):  chlorhexidine 2% Cloths 1 Application(s) Topical <User Schedule>  heparin  Infusion 1600 Unit(s)/Hr (16 mL/Hr) IV Continuous <Continuous>  piperacillin/tazobactam IVPB.. 2.25 Gram(s) IV Intermittent every 8 hours    MEDICATIONS  (PRN):  acetaminophen   Tablet .. 650 milliGRAM(s) Oral every 6 hours PRN Temp greater or equal to 38C (100.4F), Mild Pain (1 - 3)  HYDROmorphone  Injectable 1 milliGRAM(s) IV Push every 4 hours PRN Severe Pain (7 - 10)      Vital Signs Last 24 Hrs  T(C): 37.4 (09 Aug 2019 10:36), Max: 38.2 (08 Aug 2019 23:00)  T(F): 99.3 (09 Aug 2019 10:36), Max: 100.7 (08 Aug 2019 23:00)  HR: 95 (09 Aug 2019 10:39) (80 - 102)  BP: 136/70 (09 Aug 2019 10:00) (118/65 - 176/78)  BP(mean): 89 (09 Aug 2019 10:00) (89 - 117)  RR: 23 (09 Aug 2019 10:00) (18 - 38)  SpO2: 95% (09 Aug 2019 10:39) (92% - 99%)    REVIEW OF SYSTEMS:  not able to obtain, obtunded    PHYSICAL EXAM:  GENERAL: well-developed, well nourished, obtunded on BIPAP  NECK: supple, No JVD  CHEST/LUNG: decreased air entry bilaterally  HEART: normal S1S2, RRR  ABDOMEN: Soft, +BS, No flank tenderness  EXTREMITIES: LUE swelling, no clubbing, cyanosis, or edema, no calf tenderness bilateral  Neurology: obtunded, no focal neurological deficit  SKIN: No rashes  ACCESS: LUE AVF, good thrill and bruit appreciated      CAPILLARY BLOOD GLUCOSE          I&O's Summary    08 Aug 2019 07:01  -  09 Aug 2019 07:00  --------------------------------------------------------  IN: 16 mL / OUT: 300 mL / NET: -284 mL    09 Aug 2019 07:01  -  09 Aug 2019 11:23  --------------------------------------------------------  IN: 48 mL / OUT: 0 mL / NET: 48 mL          LABS:                                                   08-09-19 @ 06:04    138  |  98  |  52<H>  ----------------------------<  102<H>  4.3   |  25  |  7.76<H>                                                 08-08-19 @ 20:36    139  |  97  |  47<H>  ----------------------------<  152<H>  4.0   |  27  |  6.81<H>                                                 08-06-19 @ 15:00    139  |  104  |  68<H>  ----------------------------<  113<H>  3.9   |  19<L>  |  9.31<H>                                                 08-06-19 @ 02:47    139  |  101  |  62<H>  ----------------------------<  90  3.8   |  22  |  7.84<H>                                                 08-05-19 @ 18:48    140  |  106  |  100<H>  ----------------------------<  118<H>  5.4<H>   |  18<L>  |  11.44<H>                                                 08-05-19 @ 17:05    138  |  105  |  99<H>  ----------------------------<  58<L>  6.4<HH>   |  17<L>  |  11.47<H>    Ca    8.4      09 Aug 2019 06:04  Ca    8.7      08 Aug 2019 20:36  Ca    8.3<L>      06 Aug 2019 15:00  Ca    8.9      06 Aug 2019 02:47  Ca    9.1      05 Aug 2019 18:48  Ca    9.1      05 Aug 2019 17:05  Phos  4.6     08-06  Phos  3.1     08-06  Mg     1.9     08-09  Mg     1.9     08-06  Mg     1.9     08-06    TPro  8.4<H>  /  Alb  3.7  /  TBili  0.4  /  DBili  x   /  AST  26  /  ALT  24  /  AlkPhos  74  08-08  TPro  8.7<H>  /  Alb  4.0  /  TBili  0.5  /  DBili  x   /  AST  15  /  ALT  16  /  AlkPhos  84  08-05                          10.0   7.71  )-----------( 139      ( 09 Aug 2019 06:04 )             30.9     CBC Full  -  ( 09 Aug 2019 06:04 )  WBC Count : 7.71 K/uL  Hemoglobin : 10.0 g/dL  Hematocrit : 30.9 %  Platelet Count - Automated : 139 K/uL  Mean Cell Volume : 88.0 fl      CBC Full  -  ( 08 Aug 2019 20:36 )  WBC Count : 7.41 K/uL  Hemoglobin : 10.4 g/dL  Hematocrit : 31.3 %  Platelet Count - Automated : 143 K/uL  Mean Cell Volume : 86.2 fl      CBC Full  -  ( 06 Aug 2019 15:00 )  WBC Count : 10.02 K/uL  Hemoglobin : 11.7 g/dL  Hematocrit : 35.8 %  Platelet Count - Automated : 139 K/uL  Mean Cell Volume : 86.9 fl      CBC Full  -  ( 06 Aug 2019 13:20 )  WBC Count : 8.70 K/uL  Hemoglobin : 11.4 g/dL  Hematocrit : 35.5 %  Platelet Count - Automated : 175 K/uL  Mean Cell Volume : 87.4 fl      CBC Full  -  ( 06 Aug 2019 02:47 )  WBC Count : 7.21 K/uL  Hemoglobin : 11.5 g/dL  Hematocrit : 33.9 %  Platelet Count - Automated : 163 K/uL  Mean Cell Volume : 85.4 fl      CBC Full  -  ( 05 Aug 2019 17:05 )  WBC Count : 5.86 K/uL  Hemoglobin : 11.6 g/dL  Hematocrit : 36.3 %  Platelet Count - Automated : 144 K/uL  Mean Cell Volume : 88.3 fl        PT/INR - ( 08 Aug 2019 20:36 )   PT: 12.0 sec;   INR: 1.06          PTT - ( 09 Aug 2019 06:04 )  PTT:92.7 sec  CARDIAC MARKERS ( 09 Aug 2019 04:51 )  x     / 0.09 ng/mL / 262 U/L / x     / 1.1 ng/mL  CARDIAC MARKERS ( 08 Aug 2019 20:36 )  x     / 0.11 ng/mL / x     / x     / x              RADIOLOGY & ADDITIONAL TESTS:  < from: CT Angio Chest PE Protocol w/ IV Cont (08.09.19 @ 01:38) >    FINDINGS: CT angiography of the chest was performed with the   administration of intravenous contrast. Multiplanar and maximum intensity   projection 3D reconstructions were performed in the sagittal and coronal   planes. No prior studies are available for comparison. Examination is   limited secondary to motion.    Evaluation of the chest demonstrates the visualized thyroid gland is   normal in appearance. There is cardiomegaly. Bilateral gynecomastia.   Negative for thoracic inlet, mediastinal or hilar lymphadenopathy with   mild left axillary adenopathy with representative lymph nodes measuring   up to 1.4 cm with obliteration of central hilar fat. Evaluation of the   pulmonary vasculature demonstrates multiple occlusive pulmonary emboli   within segmental and subsegmental pulmonary arterial branches supplying   the left upper lobe, left lower lobe, right upper lobe and right lower   lobe. There is straightening of the intraventricular septum, consistent   with a component of right heart strain. Evaluation of the lung parenchyma   demonstrates pulmonary venous engorgement including extending to the   bilateral upper lobes. There are wedge-shaped regions of consolidative   opacity within the superior segment of the right lower lobe and confluent   consolidative opacity within the left lower lobe,likely regions of   pulmonary infarct. There is severe enlargement of main pulmonary artery   consistent with pulmonary arterial hypertension. Negative for   intraluminal thrombus within left atrial appendage. No endobronchial   lesion. Evaluation ofthe upper abdomen demonstrates a moderate hiatal   hernia. Bilateral renal atrophy with a cyst in the upper pole of the left   kidney measuring 1.9 cm. The hepatic artery arises directly from the   bilateral adrenal adenomatous hyperplasia. Circumferential mural   thickening of the esophagus, consistent with esophagitis. Stent within   the left axillary vein. Evaluation of the osseous structures demonstrates   diffuse sclerosis, suggestive of renal osteodystrophy.           IMPRESSION:    Bilateral occlusive segmental and subsegmental pulmonary emboli with   developing bilateral pulmonary infarcts; resultant right heart strain.    Severe distal esophagitis.    < end of copied text >        < from: Xray Chest 1 View- PORTABLE-Urgent (08.05.19 @ 19:48) >  Findings:    Support devices: Interval placement of a right IJ catheter with the tip   overlying the cavoatrial junction.    Cardiac/mediastinum/hilum: Unremarkable.    Lung parenchyma/Pleura: No focal parenchymal opacities, pleural effusion   or pneumothorax are present.    Skeleton/soft tissues: Unremarkable.    Impression:      No radiographic evidence of acute cardiopulmonary disease.    Right IJ catheter in appropriate position.    < end of copied text >

## 2019-08-09 NOTE — H&P ADULT - NSHPLABSRESULTS_GEN_ALL_CORE
LABS:                         10.0   7.71  )-----------( 139      ( 09 Aug 2019 06:04 )             30.9     08-09    138  |  98  |  52<H>  ----------------------------<  102<H>  4.3   |  25  |  7.76<H>    Ca    8.4      09 Aug 2019 06:04  Mg     1.9     08-09    TPro  8.4<H>  /  Alb  3.7  /  TBili  0.4  /  DBili  x   /  AST  26  /  ALT  24  /  AlkPhos  74  08-08    PT/INR - ( 08 Aug 2019 20:36 )   PT: 12.0 sec;   INR: 1.06          PTT - ( 09 Aug 2019 06:04 )  PTT:92.7 sec    CARDIAC MARKERS ( 09 Aug 2019 04:51 )  x     / 0.09 ng/mL / 262 U/L / x     / 1.1 ng/mL  CARDIAC MARKERS ( 08 Aug 2019 20:36 )  x     / 0.11 ng/mL / x     / x     / x            Lactate, Blood: 1.3 mmoL/L (08-08 @ 23:29)      RADIOLOGY, EKG & ADDITIONAL TESTS:   EKG sinus tachycardia w/QTC prolonged     < from: CT Angio Chest PE Protocol w/ IV Cont (08.09.19 @ 01:38) >    IMPRESSION:    Bilateral occlusive segmental and subsegmental pulmonary emboli with   developing bilateral pulmonary infarcts; resultant right heart strain.    Severe distal esophagitis.    < end of copied text >    < from: Echocardiogram (08.09.19 @ 05:47) >  CONCLUSIONS:     1. Limited study performe dby fellow on call.   2. Mild tricuspid regurgitation.   3. PASP estimated to be 45 mmHg.   4. Normal left and right ventricular size and systolic function.    < end of copied text >

## 2019-08-09 NOTE — DIETITIAN INITIAL EVALUATION ADULT. - ADD RECOMMEND
1. Encourage PO intake while off of BiPAP 2. Monitor lytes and replete prn. POC BG q6hrs 3. Trend dry wts 4. Obtain further nutrition hx and review diet ed w/pt as appropriate

## 2019-08-09 NOTE — CONSULT NOTE ADULT - SUBJECTIVE AND OBJECTIVE BOX
ICU CONSULT NOTE    HPI:    ROS:    PMH:    PSH:    SOCIAL HISTORY:    ALLERGIES:    HOME MEDICATIONS:    VITALS:    PHYSICAL EXAM:    LABORATORY:    RADIOLOGY:    ASSESSMENT AND PLAN: ICU CONSULT NOTE    HPI: Patient is a 47 yo M w/ESRD on HD T, Th, Sat via left sided AV fistula presenting complaining of shortness of breath, fevers, chills at dialysis. Patient recently admitted on vascular service and underwent removal of thrombus in AVF on 8/6/19. Patient underwent HD on 8/8 via left sided fistula and became acutely SOB w/fevers and chills. Patient denies cough, rhinorrhea, dysuria (makes urine), diarrhea, sick contacts, recent travel. He received his full HD before being sent to ED.     ED course: Tm 100.7F, HR , -137/64-82, RR 18-20, O2% 94 RA -> 97% NC.  Labs notable for WBC 7.4 w/82.3% N, Hb 10.4 from 11.7, Platelets 143 (stable). Lactate 1.3, BMP with elevated creatinine 6.81 c/w ESRD, trop elevated 0.11, BNP 19,361. EKG sinus tach w/prolonged QTc 513.     CT PE showed bilateral PE, right heart strain. Bedside echo done overnight by cardio fellow showed interventricular septal bounce, RV dysfunction with hypokinetic RV apex.     ROS:    PMH:    PSH:    SOCIAL HISTORY:    ALLERGIES:    HOME MEDICATIONS:    VITALS:    PHYSICAL EXAM:    LABORATORY:    RADIOLOGY:    ASSESSMENT AND PLAN: ICU CONSULT NOTE    HPI: Patient is a 49 yo M w/ESRD on HD T, Th, Sat via left sided AV fistula presenting complaining of shortness of breath, fevers, chills at dialysis. Patient recently admitted on vascular service and underwent removal of thrombus in AVF on 8/6/19. Patient underwent HD on 8/8 via left sided fistula and became acutely SOB w/fevers and chills. Patient denies cough, rhinorrhea, dysuria (makes urine), diarrhea, sick contacts, recent travel. He received his full HD before being sent to ED.     ED course: Tm 100.7F, HR , -137/64-82, RR 18-20, O2% 94 RA -> 97% NC.  Labs notable for WBC 7.4 w/82.3% N, Hb 10.4 from 11.7, Platelets 143 (stable). Lactate 1.3, BMP with elevated creatinine 6.81 c/w ESRD, trop elevated 0.11, BNP 19,361. EKG sinus tach w/prolonged QTc 513.     CT PE showed bilateral PE, right heart strain. Bedside echo done overnight by cardio fellow showed interventricular septal bounce, RV dysfunction with hypokinetic RV apex.     Patient given vancomycin 1250 mg, tylenol, morphine ordered but not given. ICU consulted for dispo in the setting of PE and concerning respiratory status.     ROS: negative except as in HPI    PMH: ESRD on HD T, Th, Sat via left sided AV fistula    PSH: AVF 3 years ago    SOCIAL HISTORY: No smoking, no alcohol, no drugs. Lives with a friend.      ALLERGIES: NKDA    HOME MEDICATIONS:  calcium acetate 667 mg 3 tabs tid  Magnesium gluconate     VITAL SIGNS:  T(F): 100.4 (08-09-19 @ 07:54), Max: 100.7 (08-08-19 @ 23:00)  HR: 86 (08-09-19 @ 09:00) (86 - 102)  BP: 137/75 (08-09-19 @ 09:00) (118/65 - 176/78)  BP(mean): 93 (08-09-19 @ 09:00) (93 - 117)  RR: 22 (08-09-19 @ 09:00) (18 - 38)  SpO2: 98% (08-09-19 @ 09:00) (92% - 99%)    PHYSICAL EXAM:    Constitutional: Ill appearing, diaphoretic, tachypneic not able to speak in full sentences.   HEENT: NC/AT, PERRL, EOMI, anicteric sclera, no nasal discharge; uvula midline, no oropharyngeal erythema or exudates; MMM  Respiratory: Tachypneic with accessory muscle use and abdominal breathing. Bibasilar inspiratory crackles with good air entry superiorly. No wheeze.   Cardiac: Tachycardic, +S1/S2; RRR; no M/R/G  Gastrointestinal: soft, NT/ND; no rebound or guarding; +BSx4  Back: spine midline, no bony tenderness or step-offs; no CVAT B/L  Extremities: WWP, no clubbing or cyanosis; excess skin on LE b/l with the appearance that he had recently been much more edematous. LUE with a fistula in place, no obvious surrounding erythema/edema, non-tender.  Vascular: 2+ radial, DP pulses B/L  Dermatologic: skin warm, dry and intact; Small scars diffusely over body  Lymphatic: no submandibular or cervical LAD  Neurologic: AAOx3; CNII-XII grossly intact; no focal deficits    .  LABS:                         10.0   7.71  )-----------( 139      ( 09 Aug 2019 06:04 )             30.9     08-09    138  |  98  |  52<H>  ----------------------------<  102<H>  4.3   |  25  |  7.76<H>    Ca    8.4      09 Aug 2019 06:04  Mg     1.9     08-09    TPro  8.4<H>  /  Alb  3.7  /  TBili  0.4  /  DBili  x   /  AST  26  /  ALT  24  /  AlkPhos  74  08-08    PT/INR - ( 08 Aug 2019 20:36 )   PT: 12.0 sec;   INR: 1.06          PTT - ( 09 Aug 2019 06:04 )  PTT:92.7 sec    CARDIAC MARKERS ( 09 Aug 2019 04:51 )  x     / 0.09 ng/mL / 262 U/L / x     / 1.1 ng/mL  CARDIAC MARKERS ( 08 Aug 2019 20:36 )  x     / 0.11 ng/mL / x     / x     / x            Lactate, Blood: 1.3 mmoL/L (08-08 @ 23:29)      RADIOLOGY, EKG & ADDITIONAL TESTS:   EKG sinus tachycardia w/QTC prolonged     < from: CT Angio Chest PE Protocol w/ IV Cont (08.09.19 @ 01:38) >    IMPRESSION:    Bilateral occlusive segmental and subsegmental pulmonary emboli with   developing bilateral pulmonary infarcts; resultant right heart strain.    Severe distal esophagitis.    < end of copied text >    < from: Echocardiogram (08.09.19 @ 05:47) >  CONCLUSIONS:     1. Limited study performe dby fellow on call.   2. Mild tricuspid regurgitation.   3. PASP estimated to be 45 mmHg.   4. Normal left and right ventricular size and systolic function.    < end of copied text >

## 2019-08-09 NOTE — H&P ADULT - NSHPPHYSICALEXAM_GEN_ALL_CORE
VITAL SIGNS:  T(F): 100.4 (08-09-19 @ 07:54), Max: 100.7 (08-08-19 @ 23:00)  HR: 86 (08-09-19 @ 09:00) (86 - 102)  BP: 137/75 (08-09-19 @ 09:00) (118/65 - 176/78)  BP(mean): 93 (08-09-19 @ 09:00) (93 - 117)  RR: 22 (08-09-19 @ 09:00) (18 - 38)  SpO2: 98% (08-09-19 @ 09:00) (92% - 99%)    PHYSICAL EXAM:    Constitutional: Ill appearing, diaphoretic, tachypneic not able to speak in full sentences.   HEENT: NC/AT, PERRL, EOMI, anicteric sclera, no nasal discharge; uvula midline, no oropharyngeal erythema or exudates; MMM  Respiratory: Tachypneic with accessory muscle use and abdominal breathing. Bibasilar inspiratory crackles with good air entry superiorly. No wheeze.   Cardiac: Tachycardic, +S1/S2; RRR; no M/R/G  Gastrointestinal: soft, NT/ND; no rebound or guarding; +BSx4  Back: spine midline, no bony tenderness or step-offs; no CVAT B/L  Extremities: WWP, no clubbing or cyanosis; excess skin on LE b/l with the appearance that he had recently been much more edematous. LUE with a fistula in place, no obvious surrounding erythema/edema, non-tender.  Vascular: 2+ radial, DP pulses B/L  Dermatologic: skin warm, dry and intact; Small scars diffusely over body  Lymphatic: no submandibular or cervical LAD  Neurologic: AAOx3; CNII-XII grossly intact; no focal deficits    .

## 2019-08-09 NOTE — PROGRESS NOTE ADULT - SUBJECTIVE AND OBJECTIVE BOX
Patient was seen and evaluated on dialysis.   HR: 70 (08-09-19 @ 14:00)  BP: 132/74 (08-09-19 @ 14:00)  Wt(kg): --      08-09    138  |  98  |  52<H>  ----------------------------<  102<H>  4.3   |  25  |  7.76<H>    Ca    8.4      09 Aug 2019 06:04  Mg     1.9     08-09    TPro  8.4<H>  /  Alb  3.7  /  TBili  0.4  /  DBili  x   /  AST  26  /  ALT  24  /  AlkPhos  74  08-08    Continue dialysis:   Dialyzer:   revaclear 300    QB: 400  K bath: 2  Goal UF:   2.0    L   over      150         min  Patient is tolerating the procedure well.   Continue full treatment as prescribed.

## 2019-08-09 NOTE — PROGRESS NOTE ADULT - ASSESSMENT
49 yo M w/ESRD on HD T, Th, Sat via left sided AV fistula presenting complaining of shortness of breath, fevers, chills at dialysis. Found to have b/l PE with pulmonary infarcts and right heart strain. Patient also with severe sepsis likely 2/2 to HD fistula infection and resulting multifactorial respiratory compromise. Admitted to ICU for further monitoring.     PULMONARY  #Respiratory failure- patient with tachypnea RR >30 however no hypoxia on 3 L NC. Clot burden with PE is not so extensive to cause this degree of tachypnea. Likely multifactorial in the setting of sepsis as well as PE.  - patient upgraded to BiPAP from NC to help with work of breathing   - will require monitored setting, MICU  - treatment of PE and sepsis as below    #Submassive PE- Patient with normal BP, bilateral PE w/evidence of right heart strain.   - anticoagulation with heparin gtt for now while resp status is tenuous  - source likely LUE fistula in the setting of recent known clot and surgical removal, onset of symptoms after HD  - continue to monitor VS in MICU  - PERT team activated, no indication for catheter directed tPA at the moment     ID  #Severe sepsis- Meets SIRS criteria by HR, RR and Temp. Patient with no localizing symptoms such as cough, dysuria, rhinorrhea, headache. Since fever onset during dialysis suspect infection of fistula s/p recent instrumentation.   - s/p vanc 1250 mg IV X1 in ED, zosyn  - BCX2, UCx sent in ED, f/u results  - will c/w vanc dosed with HD for now, will D/C zosyn since source suspected skin and soft tissue s/p instrumentation  - vascular following for recommendations regarding fistula, f/u recs     CARDIOVASCULAR  #Right heart strain- In the setting of PE as above  - f/u official echo  - c/w treatment of PE as above    RENAL  #HD dependent- s/p HD (full session) on 8/8/19. Patient still makes some urine  - contact renal for HD again today 8/9/19 in the setting of contrast load   - holding home calcium acetate for now, re-start when patient can take PO meds     GI, ENDO, NEURO- No active issues     F: None  E: replete cautiously in the setting of ESRD  N: NPO for now due to tenuous respiratory status  GI PPX: None   VTE PPX: Therapeutic AC in the setting of PE as above  FULL CODE   DISPO: MICU 49 yo M w/ESRD on HD T, Th, Sat via left sided AV fistula presenting complaining of shortness of breath, fevers, chills at dialysis. Found to have b/l PE with pulmonary infarcts and right heart strain. Patient also with severe sepsis likely 2/2 to HD fistula infection and resulting multifactorial respiratory compromise. Admitted to ICU for further monitoring.     PULMONARY  #Respiratory failure- patient with tachypnea RR >30 however no hypoxia on 3 L NC. Clot burden with PE is not so extensive to cause this degree of tachypnea. Likely multifactorial in the setting of sepsis as well as PE.  - patient on BiPAP but c/o chest pain on deep inspiration which is likely causing him to take shallower breaths. After 1mg dilaudid push pt had trial off biPAP where his breathing was more comfortable.   - continue dilaudid 1mg prn q4h for pain  - treatment of PE and sepsis as below    #Submassive PE- Patient with normal BP, bilateral PE w/evidence of right heart strain on ECHO  - anticoagulation with heparin gtt   - source likely LUE fistula in the setting of recent known clot and surgical removal, onset of symptoms after HD  - continue to monitor VS in MICU  - PERT team activated, no indication for catheter directed tPA at the moment     ID  #Severe sepsis- Meets SIRS criteria by HR, RR and Temp. Patient with no localizing symptoms such as cough, dysuria, rhinorrhea, headache. Since fever onset during dialysis suspect infection of fistula s/p recent instrumentation.   - BCX2, UCx sent in ED, f/u results  - C/w vanc dosed with HD   - vascular following for recommendations regarding fistula, f/u recs     CARDIOVASCULAR  #Right heart strain- In the setting of PE as above  - Official echo read w EF 55-60%, normal RV and LV function   - c/w treatment of PE as above    RENAL  #HD dependent- s/p HD (full session) on 8/8/19. Patient still makes some urine  - HD today given contrast from CT PE   - holding home calcium acetate for now, re-start when patient can take PO meds     GI, ENDO, NEURO- No active issues     F: None  E: replete cautiously in the setting of ESRD  N: NPO for now due to tenuous respiratory status  GI PPX: None   VTE PPX: Therapeutic AC in the setting of PE as above  FULL CODE   DISPO: MICU 47 yo M w/ESRD on HD T, Th, Sat via left sided AV fistula presenting complaining of shortness of breath, fevers, chills at dialysis. Found to have b/l PE with pulmonary infarcts and right heart strain. Patient also with severe sepsis likely 2/2 to HD fistula infection and resulting multifactorial respiratory compromise. Admitted to ICU for further monitoring.     PULMONARY  #Respiratory failure- patient with tachypnea RR >30 however no hypoxia on 3 L NC. Clot burden with PE is not so extensive to cause this degree of tachypnea. Likely multifactorial in the setting of sepsis as well as PE.  - patient on BiPAP but c/o chest pain on deep inspiration which is likely causing him to take shallower breaths. After 1mg dilaudid push pt had trial off biPAP where his breathing was more comfortable.   - continue dilaudid 1mg prn q4h for pain  - treatment of PE and sepsis as below    #PE- Patient with normal BP, bilateral PE on CTPE  - anticoagulation with heparin gtt   - source likely LUE fistula in the setting of recent known clot and surgical removal, onset of symptoms after HD  - continue to monitor VS in MICU  - PERT team activated, no indication for catheter directed tPA at the moment   - Official echo read w EF 55-60%, normal RV and LV function     ID  #Severe sepsis- Meets SIRS criteria by HR, RR and Temp. Patient with no localizing symptoms such as cough, dysuria, rhinorrhea, headache. Since fever onset during dialysis suspect infection of fistula s/p recent instrumentation.   - BCX2, UCx sent in ED, f/u results  - C/w vanc dosed with HD   - vascular following for recommendations regarding fistula, f/u recs     CARDIOVASCULAR  #Right heart strain- In the setting of PE as above  - Official echo read w EF 55-60%, normal RV and LV function   - c/w treatment of PE as above    RENAL  #HD dependent- s/p HD (full session) on 8/8/19. Patient still makes some urine  - HD today given contrast from CT PE   - holding home calcium acetate for now, re-start when patient can take PO meds     GI, ENDO, NEURO- No active issues     F: None  E: replete cautiously in the setting of ESRD  N: NPO for now due to tenuous respiratory status  GI PPX: None   VTE PPX: Therapeutic AC in the setting of PE as above  FULL CODE   DISPO: MICU

## 2019-08-10 DIAGNOSIS — N18.6 END STAGE RENAL DISEASE: ICD-10-CM

## 2019-08-10 DIAGNOSIS — I26.99 OTHER PULMONARY EMBOLISM WITHOUT ACUTE COR PULMONALE: ICD-10-CM

## 2019-08-10 LAB
ANION GAP SERPL CALC-SCNC: 13 MMOL/L — SIGNIFICANT CHANGE UP (ref 5–17)
APTT BLD: 57 SEC — HIGH (ref 27.5–36.3)
APTT BLD: 57.1 SEC — HIGH (ref 27.5–36.3)
APTT BLD: 61.4 SEC — HIGH (ref 27.5–36.3)
APTT BLD: >200 SEC — CRITICAL HIGH (ref 27.5–36.3)
BUN SERPL-MCNC: 47 MG/DL — HIGH (ref 7–23)
CALCIUM SERPL-MCNC: 8.7 MG/DL — SIGNIFICANT CHANGE UP (ref 8.4–10.5)
CHLORIDE SERPL-SCNC: 97 MMOL/L — SIGNIFICANT CHANGE UP (ref 96–108)
CO2 SERPL-SCNC: 27 MMOL/L — SIGNIFICANT CHANGE UP (ref 22–31)
CREAT SERPL-MCNC: 7.78 MG/DL — HIGH (ref 0.5–1.3)
GLUCOSE SERPL-MCNC: 81 MG/DL — SIGNIFICANT CHANGE UP (ref 70–99)
HCT VFR BLD CALC: 31.9 % — LOW (ref 39–50)
HCT VFR BLD CALC: 32 % — LOW (ref 39–50)
HGB BLD-MCNC: 10.3 G/DL — LOW (ref 13–17)
HGB BLD-MCNC: 10.4 G/DL — LOW (ref 13–17)
MAGNESIUM SERPL-MCNC: 2.1 MG/DL — SIGNIFICANT CHANGE UP (ref 1.6–2.6)
MCHC RBC-ENTMCNC: 28.6 PG — SIGNIFICANT CHANGE UP (ref 27–34)
MCHC RBC-ENTMCNC: 28.7 PG — SIGNIFICANT CHANGE UP (ref 27–34)
MCHC RBC-ENTMCNC: 32.3 GM/DL — SIGNIFICANT CHANGE UP (ref 32–36)
MCHC RBC-ENTMCNC: 32.5 GM/DL — SIGNIFICANT CHANGE UP (ref 32–36)
MCV RBC AUTO: 88.2 FL — SIGNIFICANT CHANGE UP (ref 80–100)
MCV RBC AUTO: 88.6 FL — SIGNIFICANT CHANGE UP (ref 80–100)
NRBC # BLD: 0 /100 WBCS — SIGNIFICANT CHANGE UP (ref 0–0)
NRBC # BLD: 0 /100 WBCS — SIGNIFICANT CHANGE UP (ref 0–0)
PHOSPHATE SERPL-MCNC: 5 MG/DL — HIGH (ref 2.5–4.5)
PLATELET # BLD AUTO: 143 K/UL — LOW (ref 150–400)
PLATELET # BLD AUTO: 151 K/UL — SIGNIFICANT CHANGE UP (ref 150–400)
POTASSIUM SERPL-MCNC: 4.5 MMOL/L — SIGNIFICANT CHANGE UP (ref 3.5–5.3)
POTASSIUM SERPL-SCNC: 4.5 MMOL/L — SIGNIFICANT CHANGE UP (ref 3.5–5.3)
RBC # BLD: 3.6 M/UL — LOW (ref 4.2–5.8)
RBC # BLD: 3.63 M/UL — LOW (ref 4.2–5.8)
RBC # FLD: 14.6 % — HIGH (ref 10.3–14.5)
RBC # FLD: 14.6 % — HIGH (ref 10.3–14.5)
SODIUM SERPL-SCNC: 137 MMOL/L — SIGNIFICANT CHANGE UP (ref 135–145)
WBC # BLD: 6.94 K/UL — SIGNIFICANT CHANGE UP (ref 3.8–10.5)
WBC # BLD: 7.93 K/UL — SIGNIFICANT CHANGE UP (ref 3.8–10.5)
WBC # FLD AUTO: 6.94 K/UL — SIGNIFICANT CHANGE UP (ref 3.8–10.5)
WBC # FLD AUTO: 7.93 K/UL — SIGNIFICANT CHANGE UP (ref 3.8–10.5)

## 2019-08-10 PROCEDURE — 71045 X-RAY EXAM CHEST 1 VIEW: CPT | Mod: 26

## 2019-08-10 PROCEDURE — 93970 EXTREMITY STUDY: CPT | Mod: 26

## 2019-08-10 PROCEDURE — 99233 SBSQ HOSP IP/OBS HIGH 50: CPT

## 2019-08-10 RX ORDER — HEPARIN SODIUM 5000 [USP'U]/ML
1400 INJECTION INTRAVENOUS; SUBCUTANEOUS
Qty: 25000 | Refills: 0 | Status: DISCONTINUED | OUTPATIENT
Start: 2019-08-10 | End: 2019-08-10

## 2019-08-10 RX ORDER — HEPARIN SODIUM 5000 [USP'U]/ML
INJECTION INTRAVENOUS; SUBCUTANEOUS
Qty: 25000 | Refills: 0 | Status: DISCONTINUED | OUTPATIENT
Start: 2019-08-10 | End: 2019-08-10

## 2019-08-10 RX ORDER — APIXABAN 2.5 MG/1
10 TABLET, FILM COATED ORAL EVERY 12 HOURS
Refills: 0 | Status: DISCONTINUED | OUTPATIENT
Start: 2019-08-10 | End: 2019-08-11

## 2019-08-10 RX ORDER — APIXABAN 2.5 MG/1
10 TABLET, FILM COATED ORAL
Refills: 0 | Status: DISCONTINUED | OUTPATIENT
Start: 2019-08-10 | End: 2019-08-10

## 2019-08-10 RX ADMIN — APIXABAN 10 MILLIGRAM(S): 2.5 TABLET, FILM COATED ORAL at 21:16

## 2019-08-10 RX ADMIN — HYDROMORPHONE HYDROCHLORIDE 1 MILLIGRAM(S): 2 INJECTION INTRAMUSCULAR; INTRAVENOUS; SUBCUTANEOUS at 11:00

## 2019-08-10 RX ADMIN — CHLORHEXIDINE GLUCONATE 1 APPLICATION(S): 213 SOLUTION TOPICAL at 05:19

## 2019-08-10 RX ADMIN — Medication 100 MILLIGRAM(S): at 01:51

## 2019-08-10 RX ADMIN — HYDROMORPHONE HYDROCHLORIDE 1 MILLIGRAM(S): 2 INJECTION INTRAMUSCULAR; INTRAVENOUS; SUBCUTANEOUS at 02:10

## 2019-08-10 RX ADMIN — HEPARIN SODIUM 1700 UNIT(S)/HR: 5000 INJECTION INTRAVENOUS; SUBCUTANEOUS at 02:15

## 2019-08-10 RX ADMIN — HYDROMORPHONE HYDROCHLORIDE 1 MILLIGRAM(S): 2 INJECTION INTRAMUSCULAR; INTRAVENOUS; SUBCUTANEOUS at 01:40

## 2019-08-10 RX ADMIN — HYDROMORPHONE HYDROCHLORIDE 1 MILLIGRAM(S): 2 INJECTION INTRAMUSCULAR; INTRAVENOUS; SUBCUTANEOUS at 11:30

## 2019-08-10 NOTE — PROGRESS NOTE ADULT - SUBJECTIVE AND OBJECTIVE BOX
Patient is a 48y Male seen and evaluated at bedside.   pt seen and examined  reports chest pain, sob,   on heparin drip    acetaminophen   Tablet .. 650 every 6 hours PRN  chlorhexidine 2% Cloths 1 <User Schedule>  heparin  Infusion.  <Continuous>  HYDROmorphone  Injectable 1 every 4 hours PRN      Allergies    No Known Allergies    Intolerances        T(C): , Max: 37.6 (08-09-19 @ 22:09)  T(F): , Max: 99.6 (08-09-19 @ 22:09)  HR: 90 (08-10-19 @ 10:00)  BP: 130/81 (08-10-19 @ 10:00)  BP(mean): 93 (08-10-19 @ 10:00)  RR: 25 (08-10-19 @ 10:00)  SpO2: 95% (08-10-19 @ 10:00)  Wt(kg): --    08-09 @ 07:01  -  08-10 @ 07:00  --------------------------------------------------------  IN: 656 mL / OUT: 2980 mL / NET: -2324 mL    08-10 @ 07:01  -  08-10 @ 11:06  --------------------------------------------------------  IN: 17 mL / OUT: 0 mL / NET: 17 mL          Review of Systems:  CONSTITUTIONAL: No fever or chills, No fatigue or tiredness.  EYES: No blurred or double vision.  RESPIRATORY:dyspnea, tachypnea  CARDIOVASCULAR: dyspnea,   GASTROINTESTINAL: NO abdominal or flank pain, No nausea or vomiting, No diarrhea  GENITOURINARY: No dysuria or urinary burning, No difficulty passing urine, No hematuria  NEUROLOGICAL: No headaches or blurred vision  SKIN: No skin rashes   MUSCULOSKELETAL: No arthralgia, Joint pain, leg edema, No muscle pains      PHYSICAL EXAM:  GENERAL: moderate respiratory distress  HEAD:  Atraumatic, Normocephalic,   EYES: Bilateral conjunctiva and sclera normal   Oral cavity: Oral mucosa dry and pink  NECK: Neck supple, No JVD  CHEST/LUNG: tachypneic, unable to assess lung exam given pt respiratory status   HEART: Regular rate and rhythm. KADEEM II/VI at LPSB, No gallop, no rub   ABDOMEN: Soft, Nontender, BS+nt, No flank tenderness.   EXTREMITIES: LUE AVF with thrill and bruit  Neurology: AAOx3, no focal neurological deficit  SKIN: chronic skin changes           LABS:                        10.4   6.94  )-----------( 143      ( 10 Aug 2019 06:15 )             32.0     08-10    137  |  97  |  47<H>  ----------------------------<  81  4.5   |  27  |  7.78<H>    Ca    8.7      10 Aug 2019 06:15  Phos  5.0     08-10  Mg     2.1     08-10    TPro  8.4<H>  /  Alb  3.7  /  TBili  0.4  /  DBili  x   /  AST  26  /  ALT  24  /  AlkPhos  74  08-08      PT/INR - ( 08 Aug 2019 20:36 )   PT: 12.0 sec;   INR: 1.06          PTT - ( 10 Aug 2019 06:15 )  PTT:57.0 sec          RADIOLOGY & ADDITIONAL STUDIES:

## 2019-08-10 NOTE — PROGRESS NOTE ADULT - SUBJECTIVE AND OBJECTIVE BOX
OVERNIGHT EVENTS:    SUBJECTIVE:    Vital Signs Last 12 Hrs  T(F): 99 (08-10-19 @ 01:03), Max: 99.6 (08-09-19 @ 22:09)  HR: 88 (08-10-19 @ 06:00) (78 - 92)  BP: 144/81 (08-10-19 @ 06:00) (109/62 - 145/75)  BP(mean): 104 (08-10-19 @ 06:00) (80 - 104)  RR: 39 (08-10-19 @ 06:00) (14 - 50)  SpO2: 96% (08-10-19 @ 06:00) (94% - 100%)  I&O's Summary    08 Aug 2019 07:01  -  09 Aug 2019 07:00  --------------------------------------------------------  IN: 16 mL / OUT: 300 mL / NET: -284 mL    09 Aug 2019 07:01  -  10 Aug 2019 06:21  --------------------------------------------------------  IN: 639 mL / OUT: 2980 mL / NET: -2341 mL        PHYSICAL EXAM:  Constitutional: NAD, comfortable in bed.  HEENT: NC/AT, PERRLA, EOMI, no conjunctival pallor or scleral icterus, MMM  Neck: Supple, no JVD  Respiratory: Normal rate, rhythm, depth, effort. CTAB. No w/r/r.   Cardiovascular: RRR, normal S1 and S2, no m/r/g.   Gastrointestinal: +BS, soft NTND, no guarding or rebound tenderness, no palpable masses   Extremities: wwp; no cyanosis, clubbing or edema.   Vascular: Pulses equal and strong throughout.   Neurological: AAOx3, no CN deficits, strength and sensation intact throughout.   Skin: No gross skin abnormalities or rashes        LABS:                        10.7   9.16  )-----------( 151      ( 09 Aug 2019 15:18 )             32.1     08-09    138  |  98  |  52<H>  ----------------------------<  102<H>  4.3   |  25  |  7.76<H>    Ca    8.4      09 Aug 2019 06:04  Mg     1.9     08-09    TPro  8.4<H>  /  Alb  3.7  /  TBili  0.4  /  DBili  x   /  AST  26  /  ALT  24  /  AlkPhos  74  08-08    PT/INR - ( 08 Aug 2019 20:36 )   PT: 12.0 sec;   INR: 1.06          PTT - ( 10 Aug 2019 00:21 )  PTT:57.1 sec      RADIOLOGY & ADDITIONAL TESTS:    MEDICATIONS  (STANDING):  chlorhexidine 2% Cloths 1 Application(s) Topical <User Schedule>  heparin  Infusion.  Unit(s)/Hr (17 mL/Hr) IV Continuous <Continuous>    MEDICATIONS  (PRN):  acetaminophen   Tablet .. 650 milliGRAM(s) Oral every 6 hours PRN Temp greater or equal to 38C (100.4F), Mild Pain (1 - 3)  HYDROmorphone  Injectable 1 milliGRAM(s) IV Push every 4 hours PRN Severe Pain (7 - 10) OVERNIGHT EVENTS: Overnightr PTT 57, heparine increased accordingly to 17/hr; episodes of tachypnea with     SUBJECTIVE:    Vital Signs Last 12 Hrs  T(F): 99 (08-10-19 @ 01:03), Max: 99.6 (08-09-19 @ 22:09)  HR: 88 (08-10-19 @ 06:00) (78 - 92)  BP: 144/81 (08-10-19 @ 06:00) (109/62 - 145/75)  BP(mean): 104 (08-10-19 @ 06:00) (80 - 104)  RR: 39 (08-10-19 @ 06:00) (14 - 50)  SpO2: 96% (08-10-19 @ 06:00) (94% - 100%)  I&O's Summary    08 Aug 2019 07:01  -  09 Aug 2019 07:00  --------------------------------------------------------  IN: 16 mL / OUT: 300 mL / NET: -284 mL    09 Aug 2019 07:01  -  10 Aug 2019 06:21  --------------------------------------------------------  IN: 639 mL / OUT: 2980 mL / NET: -2341 mL        PHYSICAL EXAM:  Constitutional: NAD, comfortable in bed.  HEENT: NC/AT, PERRLA, EOMI, no conjunctival pallor or scleral icterus, MMM  Neck: Supple, no JVD  Respiratory: Normal rate, rhythm, depth, effort. CTAB. No w/r/r.   Cardiovascular: RRR, normal S1 and S2, no m/r/g.   Gastrointestinal: +BS, soft NTND, no guarding or rebound tenderness, no palpable masses   Extremities: wwp; no cyanosis, clubbing or edema.   Vascular: Pulses equal and strong throughout.   Neurological: AAOx3, no CN deficits, strength and sensation intact throughout.   Skin: No gross skin abnormalities or rashes        LABS:                        10.7   9.16  )-----------( 151      ( 09 Aug 2019 15:18 )             32.1     08-09    138  |  98  |  52<H>  ----------------------------<  102<H>  4.3   |  25  |  7.76<H>    Ca    8.4      09 Aug 2019 06:04  Mg     1.9     08-09    TPro  8.4<H>  /  Alb  3.7  /  TBili  0.4  /  DBili  x   /  AST  26  /  ALT  24  /  AlkPhos  74  08-08    PT/INR - ( 08 Aug 2019 20:36 )   PT: 12.0 sec;   INR: 1.06          PTT - ( 10 Aug 2019 00:21 )  PTT:57.1 sec      RADIOLOGY & ADDITIONAL TESTS:    MEDICATIONS  (STANDING):  chlorhexidine 2% Cloths 1 Application(s) Topical <User Schedule>  heparin  Infusion.  Unit(s)/Hr (17 mL/Hr) IV Continuous <Continuous>    MEDICATIONS  (PRN):  acetaminophen   Tablet .. 650 milliGRAM(s) Oral every 6 hours PRN Temp greater or equal to 38C (100.4F), Mild Pain (1 - 3)  HYDROmorphone  Injectable 1 milliGRAM(s) IV Push every 4 hours PRN Severe Pain (7 - 10) OVERNIGHT EVENTS: Overnightr PTT 57, heparine increased accordingly to 17/hr; episode of tachypnea to RR of ~30,     SUBJECTIVE:    Vital Signs Last 12 Hrs  T(F): 99 (08-10-19 @ 01:03), Max: 99.6 (08-09-19 @ 22:09)  HR: 88 (08-10-19 @ 06:00) (78 - 92)  BP: 144/81 (08-10-19 @ 06:00) (109/62 - 145/75)  BP(mean): 104 (08-10-19 @ 06:00) (80 - 104)  RR: 39 (08-10-19 @ 06:00) (14 - 50)  SpO2: 96% (08-10-19 @ 06:00) (94% - 100%)  I&O's Summary    08 Aug 2019 07:01  -  09 Aug 2019 07:00  --------------------------------------------------------  IN: 16 mL / OUT: 300 mL / NET: -284 mL    09 Aug 2019 07:01  -  10 Aug 2019 06:21  --------------------------------------------------------  IN: 639 mL / OUT: 2980 mL / NET: -2341 mL        PHYSICAL EXAM:  Constitutional: NAD, comfortable in bed.  HEENT: NC/AT, PERRLA, EOMI, no conjunctival pallor or scleral icterus, MMM  Neck: Supple, no JVD  Respiratory: Normal rate, rhythm, depth, effort. CTAB. No w/r/r.   Cardiovascular: RRR, normal S1 and S2, no m/r/g.   Gastrointestinal: +BS, soft NTND, no guarding or rebound tenderness, no palpable masses   Extremities: wwp; no cyanosis, clubbing or edema.   Vascular: Pulses equal and strong throughout.   Neurological: AAOx3, no CN deficits, strength and sensation intact throughout.   Skin: No gross skin abnormalities or rashes        LABS:                        10.7   9.16  )-----------( 151      ( 09 Aug 2019 15:18 )             32.1     08-09    138  |  98  |  52<H>  ----------------------------<  102<H>  4.3   |  25  |  7.76<H>    Ca    8.4      09 Aug 2019 06:04  Mg     1.9     08-09    TPro  8.4<H>  /  Alb  3.7  /  TBili  0.4  /  DBili  x   /  AST  26  /  ALT  24  /  AlkPhos  74  08-08    PT/INR - ( 08 Aug 2019 20:36 )   PT: 12.0 sec;   INR: 1.06          PTT - ( 10 Aug 2019 00:21 )  PTT:57.1 sec      RADIOLOGY & ADDITIONAL TESTS:    MEDICATIONS  (STANDING):  chlorhexidine 2% Cloths 1 Application(s) Topical <User Schedule>  heparin  Infusion.  Unit(s)/Hr (17 mL/Hr) IV Continuous <Continuous>    MEDICATIONS  (PRN):  acetaminophen   Tablet .. 650 milliGRAM(s) Oral every 6 hours PRN Temp greater or equal to 38C (100.4F), Mild Pain (1 - 3)  HYDROmorphone  Injectable 1 milliGRAM(s) IV Push every 4 hours PRN Severe Pain (7 - 10) OVERNIGHT EVENTS: Overnightr PTT 57, heparine increased accordingly to 17/hr; episode of tachypnea to RR of ~30, responded well to dilaudid    SUBJECTIVE:  Feels that he is breathing a bit better today, especially with pain medication. Still having pleuritic sternal chest pain, 7/10.     Vital Signs Last 12 Hrs  T(F): 99 (08-10-19 @ 01:03), Max: 99.6 (08-09-19 @ 22:09)  HR: 88 (08-10-19 @ 06:00) (78 - 92)  BP: 144/81 (08-10-19 @ 06:00) (109/62 - 145/75)  BP(mean): 104 (08-10-19 @ 06:00) (80 - 104)  RR: 39 (08-10-19 @ 06:00) (14 - 50)  SpO2: 96% (08-10-19 @ 06:00) (94% - 100%)  I&O's Summary    08 Aug 2019 07:01  -  09 Aug 2019 07:00  --------------------------------------------------------  IN: 16 mL / OUT: 300 mL / NET: -284 mL    09 Aug 2019 07:01  -  10 Aug 2019 06:21  --------------------------------------------------------  IN: 639 mL / OUT: 2980 mL / NET: -2341 mL      Not on vent, sedative, pressors.  PHYSICAL EXAM:  Constitutional: NAD, comfortable in bed, speaking in full sentences, on nasal cannula 4.5L  HEENT: NC/AT, EOMI, no conjunctival pallor or scleral icterus, MMM  Respiratory: Normal rate, rhythm, shallow depth, no accessory muscle use. Minor crackles at bases b/l  Cardiovascular: Tachy, regular rythym, normal S1 and S2, no m/r/g.   Gastrointestinal: soft NTND, no guarding or rebound tenderness, no palpable masses   Extremities: wwp; left UE fistula without erythema, fistula patent with palpable flow and nontender to palpation. No extremity cyanosis, clubbing or edema.   Vascular: Pulses equal and strong throughout.   Neurological: Alert    LABS:                        10.7   9.16  )-----------( 151      ( 09 Aug 2019 15:18 )             32.1     08-09    138  |  98  |  52<H>  ----------------------------<  102<H>  4.3   |  25  |  7.76<H>    Ca    8.4      09 Aug 2019 06:04  Mg     1.9     08-09    TPro  8.4<H>  /  Alb  3.7  /  TBili  0.4  /  DBili  x   /  AST  26  /  ALT  24  /  AlkPhos  74  08-08    PT/INR - ( 08 Aug 2019 20:36 )   PT: 12.0 sec;   INR: 1.06          PTT - ( 10 Aug 2019 00:21 )  PTT:57.1 sec      RADIOLOGY & ADDITIONAL TESTS:    MEDICATIONS  (STANDING):  chlorhexidine 2% Cloths 1 Application(s) Topical <User Schedule>  heparin  Infusion.  Unit(s)/Hr (17 mL/Hr) IV Continuous <Continuous>    MEDICATIONS  (PRN):  acetaminophen   Tablet .. 650 milliGRAM(s) Oral every 6 hours PRN Temp greater or equal to 38C (100.4F), Mild Pain (1 - 3)  HYDROmorphone  Injectable 1 milliGRAM(s) IV Push every 4 hours PRN Severe Pain (7 - 10)

## 2019-08-10 NOTE — PROGRESS NOTE ADULT - ASSESSMENT
49 yo M w/ESRD on HD T, Th, Sat via left sided AV fistula presenting complaining of shortness of breath, fevers, chills at dialysis. Patient recently admitted on vascular service and underwent removal of thrombus in AVF on 8/6/19. Patient underwent HD on 8/8 via left sided fistula and became acutely SOB w/fevers and chills

## 2019-08-10 NOTE — PROGRESS NOTE ADULT - ASSESSMENT
49 yo M w PMHx of ESRD on HD T, Th, Sat via left sided AV fistula presenting complaining of shortness of breath, fevers, chills at dialysis. Found to have b/l PE with pulmonary infarcts and right heart strain.   nephrology consult is placed in regards to ESRD on HD    # ESRD on HD TTS via LUE AVF   last HD completed on 8/9  labs noted  volume status acceptable  will defer HD today  next hd anticipated on 8/12  renally dose abx    # HTN  - UF with HD      # Anemia  H/H 10.4/32  continue monitoring

## 2019-08-10 NOTE — PROGRESS NOTE ADULT - ASSESSMENT
49 y/o M with PMHx of ESRD on dialysis presents to [facility] with SOB. Pt s/p LUE AVF thrombectomy, found to have PE    - No acute vascular surgery intervention  - Continue hep gtt, a/c per primary team  - Plan discussed with chief resident  - Call [phone] with questions or acute changes

## 2019-08-10 NOTE — PROGRESS NOTE ADULT - SUBJECTIVE AND OBJECTIVE BOX
Patient is a 48y old  Male who presents with a chief complaint of Acute respiratory failure, pulmonary embolism (10 Aug 2019 06:21)        INTERVAL HPI/OVERNIGHT EVENTS: none    SYMPTOMS pleuritic chest pain without change, mild sob    DRIPS heparin        ICU Vital Signs Last 24 Hrs  T(C): 37.1 (10 Aug 2019 09:02), Max: 37.6 (09 Aug 2019 22:09)  T(F): 98.7 (10 Aug 2019 09:02), Max: 99.6 (09 Aug 2019 22:09)  HR: 90 (10 Aug 2019 10:00) (70 - 96)  BP: 130/81 (10 Aug 2019 10:00) (109/62 - 148/87)  BP(mean): 93 (10 Aug 2019 10:00) (76 - 115)  ABP: --  ABP(mean): --  RR: 25 (10 Aug 2019 10:00) (11 - 50)  SpO2: 95% (10 Aug 2019 10:00) (78% - 100%)      I&O's Summary    09 Aug 2019 07:01  -  10 Aug 2019 07:00  --------------------------------------------------------  IN: 656 mL / OUT: 2980 mL / NET: -2324 mL    10 Aug 2019 07:01  -  10 Aug 2019 10:19  --------------------------------------------------------  IN: 17 mL / OUT: 0 mL / NET: 17 mL        EXAM    Chest decreased beath sound, rapid shallow breathing    Heart rr    Abdomen soft nontender with bs    Extremities wihout edema    Neuro awake, alert      LABS:    ABG - ( 09 Aug 2019 05:18 )  pH: 7.46  /  pCO2: 39    /  pO2: 166   / HCO3: 27    / Base Excess: 2.6   /  SaO2: 99                                      10.4   6.94  )-----------( 143      ( 10 Aug 2019 06:15 )             32.0     08-10    137  |  97  |  47<H>  ----------------------------<  81  4.5   |  27  |  7.78<H>    Ca    8.7      10 Aug 2019 06:15  Phos  5.0     08-10  Mg     2.1     08-10    TPro  8.4<H>  /  Alb  3.7  /  TBili  0.4  /  DBili  x   /  AST  26  /  ALT  24  /  AlkPhos  74  08-08    PT/INR - ( 08 Aug 2019 20:36 )   PT: 12.0 sec;   INR: 1.06          PTT - ( 10 Aug 2019 06:15 )  PTT:57.0 sec    BC negative      RADIOLOGY & ADDITIONAL STUDIES:    CRITICAL CARE TIME SPENT:

## 2019-08-10 NOTE — PROGRESS NOTE ADULT - SUBJECTIVE AND OBJECTIVE BOX
Vascular Surgery Consult - Progress Note    Subjective:  Continued CP at baseline, improving SOB. Denies pain otherwise. Denies N/V.    Vital Signs Last 24 Hrs  T(C): 37.2 (10 Aug 2019 13:30), Max: 37.6 (09 Aug 2019 22:09)  T(F): 99 (10 Aug 2019 13:30), Max: 99.6 (09 Aug 2019 22:09)  HR: 88 (10 Aug 2019 15:00) (72 - 96)  BP: 140/70 (10 Aug 2019 15:00) (109/62 - 149/74)  BP(mean): 101 (10 Aug 2019 15:00) (77 - 115)  RR: 28 (10 Aug 2019 15:00) (14 - 50)  SpO2: 96% (10 Aug 2019 15:00) (78% - 100%)    I&O's Summary  09 Aug 2019 07:01  -  10 Aug 2019 07:00  --------------------------------------------------------  IN: 656 mL / OUT: 2980 mL / NET: -2324 mL    10 Aug 2019 07:01  -  10 Aug 2019 16:11  --------------------------------------------------------  IN: 113 mL / OUT: 200 mL / NET: -87 mL    Physical Exam  General: NAD, resting comfortably in bed  C/V: NSR  Pulm: Nonlabored breathing  Extrem: 1+ b/l LE edema    LABS:                     10.3   7.93  )-----------( 151      ( 10 Aug 2019 11:32 )             31.9     08-10  137  |  97  |  47<H>  ----------------------------<  81  4.5   |  27  |  7.78<H>    Ca    8.7      10 Aug 2019 06:15  Phos  5.0     08-10  Mg     2.1     08-10    TPro  8.4<H>  /  Alb  3.7  /  TBili  0.4  /  DBili  x   /  AST  26  /  ALT  24  /  AlkPhos  74  08-08    PT/INR - ( 08 Aug 2019 20:36 )   PT: 12.0 sec;   INR: 1.06     PTT - ( 10 Aug 2019 11:32 )  PTT:>200.0 sec    LIVER FUNCTIONS - ( 08 Aug 2019 20:36 )  Alb: 3.7 g/dL / Pro: 8.4 g/dL / ALK PHOS: 74 U/L / ALT: 24 U/L / AST: 26 U/L / GGT: x

## 2019-08-10 NOTE — PROGRESS NOTE ADULT - ASSESSMENT
A - acute pulmonary embolism/possible graft infection /esrd    Suggest:  increase heparin  switchs to oral agent  continue vanc pending cultures  dialysis per renal  pain meds  HF if needed for SOB

## 2019-08-10 NOTE — PROGRESS NOTE ADULT - PROBLEM SELECTOR PLAN 2
Hemodialysis yesterday with 2.0L UF  SOB today most likely from PE  volume status appears good and still with UO (just had 200cc in urinal)  no indications for HD today - next HD on Monday  follow Vanco levels for ? sepsis

## 2019-08-10 NOTE — PROGRESS NOTE ADULT - SUBJECTIVE AND OBJECTIVE BOX
CC: PULMONARY EMBOLISM      INTERVAL HISTORY:  sitting in bed  appears dyspneic  just received IV Dilaudid for abdominal pain      ROS: No chest pain, no sob, no abd pain. No n/v/d    PAST MEDICAL & SURGICAL HISTORY:  ESRD (end stage renal disease) on dialysis  AV fistula  No significant past surgical history      PHYSICAL EXAM:  T(C): 37.1 (08-10-19 @ 09:02), Max: 37.6 (08-09-19 @ 22:09)  HR: 90 (08-10-19 @ 10:00)  BP: 130/81 (08-10-19 @ 10:00) (109/62 - 148/87)  RR: 25 (08-10-19 @ 10:00)  SpO2: 95% (08-10-19 @ 10:00)  Wt(kg): --  I&O's Summary    09 Aug 2019 07:01  -  10 Aug 2019 07:00  --------------------------------------------------------  IN: 656 mL / OUT: 2980 mL / NET: -2324 mL    10 Aug 2019 07:01  -  10 Aug 2019 11:03  --------------------------------------------------------  IN: 17 mL / OUT: 0 mL / NET: 17 mL      Weight 91.9 (08-09 @ 08:00), 92.6 (08-06 @ 05:51)  General:   NAD.  HEENT: moist mucous membranes, no pallor/cyanosis.  Neck: no JVD visible.  Cardiac: S1, S2. RRR. No murmurs   Respratory: CTA b/l, no access muscle use.   Abdomen: soft. nontender. nondistended  Skin: no rashes.  Extremities: no LE edema b/l  Access: + bruit in L AVF      DATA:                        10.4<L>  6.94  )-----------( 143<L>    ( 10 Aug 2019 06:15 )             32.0<L>        137    |  97     |  47<H>  ----------------------------<  81     Ca:8.7   (10 Aug 2019 06:15)  4.5     |  27     |  7.78<H>      eGFR if Non : 7 <L>  eGFR if : 8 <L>    TPro  8.4<H>  /  Alb  3.7    /  TBili  0.4    /  DBili  x      /  AST  26     /  ALT  24     /  AlkPhos  74     08 Aug 2019 20:36                    MEDICATIONS  (STANDING):  chlorhexidine 2% Cloths 1 Application(s) Topical <User Schedule>  heparin  Infusion.  Unit(s)/Hr (17 mL/Hr) IV Continuous <Continuous>    MEDICATIONS  (PRN):  acetaminophen   Tablet .. 650 milliGRAM(s) Oral every 6 hours PRN Temp greater or equal to 38C (100.4F), Mild Pain (1 - 3)  HYDROmorphone  Injectable 1 milliGRAM(s) IV Push every 4 hours PRN Severe Pain (7 - 10)

## 2019-08-10 NOTE — PROGRESS NOTE ADULT - ASSESSMENT
49 yo M w/ESRD on HD T, Th, Sat via left sided AV fistula presenting complaining of shortness of breath, fevers, chills at dialysis. Found to have b/l PE with pulmonary infarcts and right heart strain. Patient also with severe sepsis likely 2/2 to HD fistula infection and resulting multifactorial respiratory compromise. Admitted to ICU for further monitoring.     PULMONARY  #Respiratory failure- patient with tachypnea RR >30 however no hypoxia on 3 L NC. Clot burden with PE is not so extensive to cause this degree of tachypnea. Likely multifactorial in the setting of sepsis as well as PE.  - patient on BiPAP but c/o chest pain on deep inspiration which is likely causing him to take shallower breaths. After 1mg dilaudid push pt had trial off biPAP where his breathing was more comfortable.   - continue dilaudid 1mg prn q4h for pain  - treatment of PE and sepsis as below    #PE- Patient with normal BP, bilateral PE on CTPE  - anticoagulation with heparin gtt   - source likely LUE fistula in the setting of recent known clot and surgical removal, onset of symptoms after HD  - continue to monitor VS in MICU  - PERT team activated, no indication for catheter directed tPA at the moment   - Official echo read w EF 55-60%, normal RV and LV function     ID  #Severe sepsis- Meets SIRS criteria by HR, RR and Temp. Patient with no localizing symptoms such as cough, dysuria, rhinorrhea, headache. Since fever onset during dialysis suspect infection of fistula s/p recent instrumentation.   - BCX2, UCx sent in ED, f/u results  - C/w vanc dosed with HD   - vascular following for recommendations regarding fistula, f/u recs     CARDIOVASCULAR  #Right heart strain- In the setting of PE as above  - Official echo read w EF 55-60%, normal RV and LV function   - c/w treatment of PE as above    RENAL  #HD dependent- s/p HD (full session) on 8/8/19. Patient still makes some urine  - HD today given contrast from CT PE   - holding home calcium acetate for now, re-start when patient can take PO meds     GI, ENDO, NEURO- No active issues     F: None  E: replete cautiously in the setting of ESRD  N: NPO for now due to tenuous respiratory status  GI PPX: None   VTE PPX: Therapeutic AC in the setting of PE as above  FULL CODE   DISPO: MICU 47 yo M w/ESRD on HD T, Th, Sat via left sided AV fistula presenting complaining of shortness of breath, fevers, chills at dialysis. Found to have b/l PE with pulmonary infarcts and right heart strain. Patient also with severe sepsis likely 2/2 to HD fistula infection and resulting multifactorial respiratory compromise. Now on NC 4.5L satting well, pleuritic pain controlled with dilauded, HD stable     PULMONARY  #Respiratory failure- patient refusing BIPAP, episodes of tachypnea releived with dilaudid 1mg, likley 2/2 to pleuritic CP from PEs. Likely multifactorial in the setting of possible sepsis as well as PE.  - After 1mg dilaudid push pt had trial off biPAP where his breathing was more comfortable.   - continue dilaudid 1mg prn q4h for pain  - treatment of PE and sepsis as below    #PE- Patient with normal BP, bilateral PE on CTPE  - anticoagulation with heparin gtt   - source likely LUE fistula in the setting of recent known clot and surgical removal, onset of symptoms after HD  - continue to monitor VS in MICU  - PERT team activated, no indication for catheter directed tPA   - Official echo read w EF 55-60%, normal RV and LV function     ID  #Severe sepsis- Meets SIRS criteria by HR, RR and Temp. Patient with no localizing symptoms such as cough, dysuria, rhinorrhea, headache. Since fever onset during dialysis suspect infection of fistula s/p recent instrumentation.   - BCX2, UCx sent in ED, f/u results  - C/w vanc dosed with HD; patient has not spiked fever while on vanc   - vascular following for recommendations regarding fistula, f/u recs     CARDIOVASCULAR  #Right heart strain- In the setting of PE as above  - Official echo read w EF 55-60%, normal RV and LV function   - c/w treatment of PE as above    RENAL  #HD dependent- s/p HD (full session) on 8/8/19. Also received HD 8/9; will skip 8/10. Patient still makes some urine  - holding home calcium acetate for now, re-start when patient can take PO meds     GI, ENDO, NEURO- No active issues     F: None  E: replete cautiously in the setting of ESRD  N: NPO for now due to tenuous respiratory status  GI PPX: None   VTE PPX: Therapeutic AC in the setting of PE as above  FULL CODE   DISPO: MICU 49 yo M w/ESRD on HD T, Th, Sat via left sided AV fistula presenting complaining of shortness of breath, fevers, chills at dialysis. Found to have b/l PE with pulmonary infarcts and right heart strain. Patient also with severe sepsis likely 2/2 to HD fistula infection and resulting multifactorial respiratory compromise. Now on NC 4.5L satting well, pleuritic pain controlled with dilauded, HD stable     PULMONARY  #Respiratory failure- patient refusing BIPAP, episodes of tachypnea releived with dilaudid 1mg, likley 2/2 to pleuritic CP from PEs. Likely multifactorial in the setting of possible sepsis as well as PE.  - After 1mg dilaudid push pt had trial off biPAP where his breathing was more comfortable.   - continue dilaudid 1mg prn q4h for pain  - treatment of PE and sepsis as below    #PE- Patient with normal BP, bilateral PE on CTPE  - anticoagulation with heparin gtt  - speak with renal about transition, eliquis will need redosing given esrd, also could do coumadin  - source likely LUE fistula in the setting of recent known clot and surgical removal, onset of symptoms after HD  - continue to monitor VS in MICU  - PERT team activated, no indication for catheter directed tPA   - Official echo read w EF 55-60%, normal RV and LV function     ID  #Severe sepsis- Meets SIRS criteria by HR, RR and Temp. Patient with no localizing symptoms such as cough, dysuria, rhinorrhea, headache. Since fever onset during dialysis suspect infection of fistula s/p recent instrumentation.   - BCX2, UCx sent in ED, f/u results  - C/w vanc dosed with HD; patient has not spiked fever while on vanc   - vascular following for recommendations regarding fistula, f/u recs     CARDIOVASCULAR  #Right heart strain- In the setting of PE as above  - Official echo read w EF 55-60%, normal RV and LV function   - c/w treatment of PE as above    RENAL  #HD dependent- s/p HD (full session) on 8/8/19. Also received HD 8/9; will skip 8/10. Patient still makes some urine  - holding home calcium acetate for now, re-start when patient can take PO meds     GI, ENDO, NEURO- No active issues     F: None  E: replete cautiously in the setting of ESRD  N: NPO for now due to tenuous respiratory status  GI PPX: None   VTE PPX: Therapeutic AC in the setting of PE as above  FULL CODE   DISPO: MICU

## 2019-08-11 DIAGNOSIS — R63.8 OTHER SYMPTOMS AND SIGNS CONCERNING FOOD AND FLUID INTAKE: ICD-10-CM

## 2019-08-11 DIAGNOSIS — A41.9 SEPSIS, UNSPECIFIED ORGANISM: ICD-10-CM

## 2019-08-11 DIAGNOSIS — Z91.89 OTHER SPECIFIED PERSONAL RISK FACTORS, NOT ELSEWHERE CLASSIFIED: ICD-10-CM

## 2019-08-11 DIAGNOSIS — I26.99 OTHER PULMONARY EMBOLISM WITHOUT ACUTE COR PULMONALE: ICD-10-CM

## 2019-08-11 LAB
ANION GAP SERPL CALC-SCNC: 16 MMOL/L — SIGNIFICANT CHANGE UP (ref 5–17)
APTT BLD: 33.7 SEC — SIGNIFICANT CHANGE UP (ref 27.5–36.3)
BUN SERPL-MCNC: 65 MG/DL — HIGH (ref 7–23)
CALCIUM SERPL-MCNC: 8.9 MG/DL — SIGNIFICANT CHANGE UP (ref 8.4–10.5)
CHLORIDE SERPL-SCNC: 95 MMOL/L — LOW (ref 96–108)
CO2 SERPL-SCNC: 23 MMOL/L — SIGNIFICANT CHANGE UP (ref 22–31)
CREAT SERPL-MCNC: 10.21 MG/DL — HIGH (ref 0.5–1.3)
GLUCOSE SERPL-MCNC: 76 MG/DL — SIGNIFICANT CHANGE UP (ref 70–99)
HCT VFR BLD CALC: 32.2 % — LOW (ref 39–50)
HGB BLD-MCNC: 10.4 G/DL — LOW (ref 13–17)
INR BLD: 1.44 — HIGH (ref 0.88–1.16)
MAGNESIUM SERPL-MCNC: 2.1 MG/DL — SIGNIFICANT CHANGE UP (ref 1.6–2.6)
MCHC RBC-ENTMCNC: 28.6 PG — SIGNIFICANT CHANGE UP (ref 27–34)
MCHC RBC-ENTMCNC: 32.3 GM/DL — SIGNIFICANT CHANGE UP (ref 32–36)
MCV RBC AUTO: 88.5 FL — SIGNIFICANT CHANGE UP (ref 80–100)
NRBC # BLD: 0 /100 WBCS — SIGNIFICANT CHANGE UP (ref 0–0)
PHOSPHATE SERPL-MCNC: 6.1 MG/DL — HIGH (ref 2.5–4.5)
PLATELET # BLD AUTO: 191 K/UL — SIGNIFICANT CHANGE UP (ref 150–400)
POTASSIUM SERPL-MCNC: 5 MMOL/L — SIGNIFICANT CHANGE UP (ref 3.5–5.3)
POTASSIUM SERPL-SCNC: 5 MMOL/L — SIGNIFICANT CHANGE UP (ref 3.5–5.3)
PROTHROM AB SERPL-ACNC: 16.5 SEC — HIGH (ref 10–12.9)
RBC # BLD: 3.64 M/UL — LOW (ref 4.2–5.8)
RBC # FLD: 14.6 % — HIGH (ref 10.3–14.5)
SODIUM SERPL-SCNC: 134 MMOL/L — LOW (ref 135–145)
WBC # BLD: 9.28 K/UL — SIGNIFICANT CHANGE UP (ref 3.8–10.5)
WBC # FLD AUTO: 9.28 K/UL — SIGNIFICANT CHANGE UP (ref 3.8–10.5)

## 2019-08-11 PROCEDURE — 99233 SBSQ HOSP IP/OBS HIGH 50: CPT

## 2019-08-11 RX ORDER — HEPARIN SODIUM 5000 [USP'U]/ML
1400 INJECTION INTRAVENOUS; SUBCUTANEOUS
Qty: 25000 | Refills: 0 | Status: DISCONTINUED | OUTPATIENT
Start: 2019-08-11 | End: 2019-08-12

## 2019-08-11 RX ORDER — WARFARIN SODIUM 2.5 MG/1
2.5 TABLET ORAL ONCE
Refills: 0 | Status: COMPLETED | OUTPATIENT
Start: 2019-08-11 | End: 2019-08-11

## 2019-08-11 RX ORDER — HYDROMORPHONE HYDROCHLORIDE 2 MG/ML
2 INJECTION INTRAMUSCULAR; INTRAVENOUS; SUBCUTANEOUS EVERY 4 HOURS
Refills: 0 | Status: DISCONTINUED | OUTPATIENT
Start: 2019-08-11 | End: 2019-08-14

## 2019-08-11 RX ORDER — HYDROMORPHONE HYDROCHLORIDE 2 MG/ML
1 INJECTION INTRAMUSCULAR; INTRAVENOUS; SUBCUTANEOUS
Refills: 0 | Status: DISCONTINUED | OUTPATIENT
Start: 2019-08-11 | End: 2019-08-14

## 2019-08-11 RX ADMIN — HEPARIN SODIUM 14 UNIT(S)/HR: 5000 INJECTION INTRAVENOUS; SUBCUTANEOUS at 21:10

## 2019-08-11 RX ADMIN — HYDROMORPHONE HYDROCHLORIDE 1 MILLIGRAM(S): 2 INJECTION INTRAMUSCULAR; INTRAVENOUS; SUBCUTANEOUS at 09:18

## 2019-08-11 RX ADMIN — HYDROMORPHONE HYDROCHLORIDE 1 MILLIGRAM(S): 2 INJECTION INTRAMUSCULAR; INTRAVENOUS; SUBCUTANEOUS at 00:13

## 2019-08-11 RX ADMIN — Medication 650 MILLIGRAM(S): at 09:50

## 2019-08-11 RX ADMIN — HYDROMORPHONE HYDROCHLORIDE 1 MILLIGRAM(S): 2 INJECTION INTRAMUSCULAR; INTRAVENOUS; SUBCUTANEOUS at 09:50

## 2019-08-11 RX ADMIN — CHLORHEXIDINE GLUCONATE 1 APPLICATION(S): 213 SOLUTION TOPICAL at 05:35

## 2019-08-11 RX ADMIN — HYDROMORPHONE HYDROCHLORIDE 1 MILLIGRAM(S): 2 INJECTION INTRAMUSCULAR; INTRAVENOUS; SUBCUTANEOUS at 05:45

## 2019-08-11 RX ADMIN — HYDROMORPHONE HYDROCHLORIDE 1 MILLIGRAM(S): 2 INJECTION INTRAMUSCULAR; INTRAVENOUS; SUBCUTANEOUS at 06:00

## 2019-08-11 RX ADMIN — HYDROMORPHONE HYDROCHLORIDE 1 MILLIGRAM(S): 2 INJECTION INTRAMUSCULAR; INTRAVENOUS; SUBCUTANEOUS at 17:03

## 2019-08-11 RX ADMIN — Medication 650 MILLIGRAM(S): at 09:23

## 2019-08-11 RX ADMIN — Medication 100 MILLIGRAM(S): at 11:40

## 2019-08-11 RX ADMIN — HYDROMORPHONE HYDROCHLORIDE 1 MILLIGRAM(S): 2 INJECTION INTRAMUSCULAR; INTRAVENOUS; SUBCUTANEOUS at 17:20

## 2019-08-11 RX ADMIN — WARFARIN SODIUM 2.5 MILLIGRAM(S): 2.5 TABLET ORAL at 21:10

## 2019-08-11 RX ADMIN — APIXABAN 10 MILLIGRAM(S): 2.5 TABLET, FILM COATED ORAL at 09:24

## 2019-08-11 RX ADMIN — HYDROMORPHONE HYDROCHLORIDE 1 MILLIGRAM(S): 2 INJECTION INTRAMUSCULAR; INTRAVENOUS; SUBCUTANEOUS at 00:30

## 2019-08-11 NOTE — PROGRESS NOTE ADULT - ASSESSMENT
A -acute PE/ pulmonary infarct/ESRD/ possible infection    Suggest:    keep in ICU  needs better pain control  keep on high flow  on eliquis, per pharm no adjustment n dose  no the abx, cultures

## 2019-08-11 NOTE — PROGRESS NOTE ADULT - PROBLEM SELECTOR PLAN 4
1) PCP Contacted on Admission: (Y/N) --> Name & Phone #:  2) Date of Contact with PCP:  3) PCP Contacted at Discharge: (Y/N, N/A)  4) Summary of Handoff Given to PCP:   5) Post-Discharge Appointment Date and Location: F: None  E: replete cautiously in the setting of ESRD  N: DASH/TLC renal diet   GI PPX: None  VTE PPX: Therapeutic AC in the setting of PE as above (Heparin gtt, Coumadin bridge)  FULL CODE   DISPO: [facility]

## 2019-08-11 NOTE — PHYSICAL THERAPY INITIAL EVALUATION ADULT - PERTINENT HX OF CURRENT PROBLEM, REHAB EVAL
Patient is a 48 year old M Patient is a 48 year old M with ESRD on HD T, Th, Sat via left sided AV fistula presenting complaining of shortness of breath, fevers, chills at dialysis. Patient recently admitted on vascular service and underwent removal of thrombus in AVF on 8/6/19. Patient underwent HD on 8/8 via left sided fistula and became acutely SOB w/fevers and chills.

## 2019-08-11 NOTE — PHYSICAL THERAPY INITIAL EVALUATION ADULT - LEVEL OF INDEPENDENCE: CHAIR TO BED, REHAB EVAL
not observed, patient left sitting in bedside chair with +call bell, all lines intact, in no apparent distress, instructed to press call bell and await assistance should he desire to get up or need something

## 2019-08-11 NOTE — PROGRESS NOTE ADULT - PROBLEM SELECTOR PLAN 3
F: None  E: replete cautiously in the setting of ESRD  N: DASH/TLC renal diet   GI PPX: None  VTE PPX: Therapeutic AC in the setting of PE as above   FULL CODE   DISPO: [facility] Meets SIRS criteria by HR, RR and Temp. Patient with no localizing symptoms such as cough, dysuria, rhinorrhea, headache. Since fever onset during dialysis suspect infection of fistula s/p recent instrumentation. s/p Vanc/Zosyn x1. last temp 101.2 (axillary 08/11/19).  - BCx NGTD x2 (08/09/2019)  - hold abx at this time   - vascular following for recommendations regarding fistula, f/u rec

## 2019-08-11 NOTE — PROGRESS NOTE ADULT - PROBLEM SELECTOR PLAN 5
1) PCP Contacted on Admission: (Y/N) --> Name & Phone #: pt with no PCP  2) Date of Contact with PCP:  3) PCP Contacted at Discharge: (Y/N, N/A)  4) Summary of Handoff Given to PCP:   5) Post-Discharge Appointment Date and Location:

## 2019-08-11 NOTE — PROGRESS NOTE ADULT - ASSESSMENT
49 yo M w/ESRD on HD T, Th, Sat via left sided AV fistula presenting complaining of shortness of breath, fevers, chills at dialysis. Found to have b/l PE with pulmonary infarcts and right heart strain. Patient also with severe sepsis likely 2/2 to HD fistula infection and resulting multifactorial respiratory compromise. Transitioned to NC 2L satting well, pleuritic pain controlled with dilaudid, HD stable, transferred to 7L for further management.

## 2019-08-11 NOTE — PROGRESS NOTE ADULT - ASSESSMENT
49 yo M w/ESRD on HD T, Th, Sat via left sided AV fistula presenting complaining of shortness of breath, fevers, chills at dialysis. Found to have b/l PE with pulmonary infarcts and right heart strain. Patient also with severe sepsis likely 2/2 to HD fistula infection and resulting multifactorial respiratory compromise. Now on NC 4.5L satting well, pleuritic pain controlled with dilauded, HD stable     PULMONARY  #Respiratory failure  - On 4.5L NC, episodes of tachypnea relieved with dilaudid, likely 2/2 to pleuritic CP from PEs  - increase dilaudid dosing to 1mg q2h for moderate pain, 2mg q4h for severe pain  - After 1mg dilaudid push pt had trial off biPAP where his breathing was more comfortable.   - treatment of PE and sepsis as below    #PE- Patient with normal BP, bilateral PE on CTPE, source likely LUE fistula in the setting of recent known clot and surgical removal, onset of symptoms after HD  - transition to eliquis 10mg x 2 days(day 2/2 today) then 5mg qd   - Official echo read w EF 55-60%, normal RV and LV function, repeat ECHO today     ID  #Severe sepsis- Meets SIRS criteria by HR, RR and Temp. Patient with no localizing symptoms such as cough, dysuria, rhinorrhea, headache. Since fever onset during dialysis suspect infection of fistula s/p recent instrumentation.   - BCX2, UCx sent in ED, f/u results  - C/w vanc dosed with HD; patient has not spiked fever while on vanc   - vascular following for recommendations regarding fistula, f/u recs     CARDIOVASCULAR  - Official echo read w EF 55-60%, normal RV and LV function , repeat ECHO today   - c/w treatment of PE as above    RENAL  #HD dependent - s/p HD (full session) on 8/8/19. Also received HD 8/9; will skip 8/10. Patient still makes some urine  - holding home calcium acetate for now, re-start when patient can take PO meds  - no HD today per renal    GI, ENDO, NEURO- No active issues     F: None  E: replete cautiously in the setting of ESRD  N: NPO for now due to tenuous respiratory status  GI PPX: None  VTE PPX: Therapeutic AC in the setting of PE as above   FULL CODE   DISPO: [facility]

## 2019-08-11 NOTE — PROGRESS NOTE ADULT - SUBJECTIVE AND OBJECTIVE BOX
Patient is a 48y Male seen and evaluated at bedside.   pt seen and examined  still tachypneic   labs noted      acetaminophen   Tablet .. 650 every 6 hours PRN  apixaban 10 every 12 hours  chlorhexidine 2% Cloths 1 <User Schedule>  HYDROmorphone  Injectable 2 every 4 hours PRN  HYDROmorphone  Injectable 1 every 2 hours PRN      Allergies    No Known Allergies    Intolerances        T(C): , Max: 38.4 (08-11-19 @ 09:21)  T(F): , Max: 101.2 (08-11-19 @ 09:21)  HR: 98 (08-11-19 @ 09:00)  BP: 140/76 (08-11-19 @ 09:00)  BP(mean): 101 (08-11-19 @ 09:00)  RR: 40 (08-11-19 @ 09:00)  SpO2: 97% (08-11-19 @ 09:00)  Wt(kg): --    08-10 @ 07:01  -  08-11 @ 07:00  --------------------------------------------------------  IN: 183 mL / OUT: 1150 mL / NET: -967 mL    08-11 @ 07:01  -  08-11 @ 09:25  --------------------------------------------------------  IN: 0 mL / OUT: 200 mL / NET: -200 mL          Review of Systems:  CONSTITUTIONAL: No fever or chills, No fatigue or tiredness.  EYES: No blurred or double vision.  RESPIRATORY:dyspnea, tachypneic  CARDIOVASCULAR: SOB,   GASTROINTESTINAL: NO abdominal or flank pain, No nausea or vomiting, No diarrhea  GENITOURINARY: No dysuria or urinary burning, No difficulty passing urine, No hematuria  NEUROLOGICAL: No headaches or blurred vision  SKIN: No skin rashes   MUSCULOSKELETAL: No arthralgia, Joint pain, leg edema, No muscle pains      PHYSICAL EXAM:  GENERAL: moderate respiratory distress  HEAD:  Atraumatic, Normocephalic,   EYES: Bilateral conjunctiva and sclera normal   Oral cavity: Oral mucosa dry and pink  NECK: Neck supple, No JVD  CHEST/LUNG: tachypneic, unable to assess lung exam given pt respiratory status   HEART: Regular rate and rhythm. KADEEM II/VI at LPSB, No gallop, no rub   ABDOMEN: Soft, Nontender, BS+nt, No flank tenderness.   EXTREMITIES: LUE AVF with thrill and bruit  Neurology: AAOx3, no focal neurological deficit  SKIN: chronic skin changes          LABS:                        10.3   7.93  )-----------( 151      ( 10 Aug 2019 11:32 )             31.9     08-11    134<L>  |  95<L>  |  65<H>  ----------------------------<  76  5.0   |  23  |  10.21<H>    Ca    8.9      11 Aug 2019 05:53  Phos  6.1     08-11  Mg     2.1     08-11        PTT - ( 10 Aug 2019 17:55 )  PTT:61.4 sec          RADIOLOGY & ADDITIONAL STUDIES:

## 2019-08-11 NOTE — PROGRESS NOTE ADULT - ASSESSMENT
49 y/o M with PMHx of ESRD on dialysis presents to [facility] with SOB. Pt s/p LUE AVF thrombectomy, found to have PE, currently on anticoagulation.    - No acute vascular surgery intervention  - Agree with continuing hep gtt, a/c per primary team  - Plan discussed with chief resident  - Vascular team signing off, please reconsult if necessary ([id number])

## 2019-08-11 NOTE — PHYSICAL THERAPY INITIAL EVALUATION ADULT - GENERAL OBSERVATIONS, REHAB EVAL
Received semi-Ascencio's position in bed with +tele, +pulse ox, on 3LPM O2 via NC, in no apparent distress. Patient appears to be resting comfortably in bed

## 2019-08-11 NOTE — PHYSICAL THERAPY INITIAL EVALUATION ADULT - IMPAIRMENTS FOUND, PT EVAL
muscle strength/aerobic capacity/endurance/gait, locomotion, and balance/gross motor/ventilation and respiration/gas exchange

## 2019-08-11 NOTE — PROGRESS NOTE ADULT - SUBJECTIVE AND OBJECTIVE BOX
Subjective: Continued CP with inspiration at baseline, states he is still experiencing SOB. Denies pain otherwise. Denies N/V.    ICU Vital Signs Last 24 Hrs  T(C): 36.8 (11 Aug 2019 14:12), Max: 38.4 (11 Aug 2019 09:21)  T(F): 98.3 (11 Aug 2019 14:12), Max: 101.2 (11 Aug 2019 09:21)  HR: 84 (11 Aug 2019 16:00) (80 - 98)  BP: 112/62 (11 Aug 2019 16:00) (108/60 - 152/78)  BP(mean): 80 (11 Aug 2019 16:00) (77 - 107)  RR: 26 (11 Aug 2019 16:00) (14 - 40)  SpO2: 99% (11 Aug 2019 16:00) (92% - 100%)    I&O's Summary    10 Aug 2019 07:01  -  11 Aug 2019 07:00  --------------------------------------------------------  IN: 183 mL / OUT: 1150 mL / NET: -967 mL    11 Aug 2019 07:01  -  11 Aug 2019 17:01  --------------------------------------------------------  IN: 0 mL / OUT: 200 mL / NET: -200 mL        Physical Exam  General: NAD, resting comfortably in bed  Pulm: Increased work of breathing with movement, speech. No respiratory distress.   Extrem: Thrill palpable in LUE AVF. L radial pulse palpable. 1+ b/l LE edema. Calves soft, nontender b/l.  Neuro: AAOx3. Moving all extremities.       LABS:                                  10.4   9.28  )-----------( 191      ( 11 Aug 2019 13:27 )             32.2   08-11    134<L>  |  95<L>  |  65<H>  ----------------------------<  76  5.0   |  23  |  10.21<H>    Ca    8.9      11 Aug 2019 05:53  Phos  6.1     08-11  Mg     2.1     08-11    PTT - ( 10 Aug 2019 17:55 )  PTT:61.4 sec

## 2019-08-11 NOTE — PROGRESS NOTE ADULT - ASSESSMENT
47 yo M w PMHx of ESRD on HD T, Th, Sat via left sided AV fistula presenting complaining of shortness of breath, fevers, chills at dialysis. Found to have b/l PE with pulmonary infarcts and right heart strain.   nephrology consult is placed in regards to ESRD on HD    # ESRD on HD TTS via LUE AVF   last HD completed on 8/9  labs noted  volume status acceptable  will defer HD today  next hd anticipated on 8/12 or 8/13  renally dose abx    # HTN  - UF with HD      # Anemia  H/H 10.3/31.9  continue monitoring

## 2019-08-11 NOTE — PROGRESS NOTE ADULT - SUBJECTIVE AND OBJECTIVE BOX
Transfer Acceptance Note from [facility] to [facility] Course:   49 yo M w/ESRD on HD T, Th, Sat via left sided AV fistula presenting complaining of shortness of breath, fevers, chills at dialysis. Found to have b/l PE with pulmonary infarcts. Patient also with severe sepsis likely 2/2 to HD fistula infection and resulting multifactorial respiratory compromise. Admitted to ICU for further monitoring. He was CTPE positive for B/L PE. Started on heparin drip, PTT 92 @ 6am and 83 @3pm. ECHO w EF 55-60% w nL RV and LV fxn. Met criteria for severe sepsis. BCx, UCx sent w suspicion for AV fistula infection given recent instrumentation to site on 8/6. HD done and started on Vanc. Vanc trough @ 5pm 12.9. Dosed Vanc 1250 x 1 after HD. Placed on bipap and started on 1mg q4h prn dilaudid to help pain w breathing.  Overnight, pt's heparin IV on R hand fell off, unclear duration and his 9pm was PTT 48.3; midnight PTT 57.1. His heparin drip increased to 17cc/hr. And was given tessalon pearls for cough. Later in the day his tachypnea worsened and was attempted to place BiPAP however refused BiPAP. He remained on NC and was given dilaudid for pain and RR improved. On 8/10 he remained on nasal cannula and his work of breathing was able to be controlled with dilaudid. His was PTT >200 at noon, so heparin was held and restarted at 1400. His PM PTT was 64 so he was then restarted on his heparin drip. No vanc was given over the weekend because HD was deferred by renal.  He was transitioned to eliquis per pharmacy and nephro recs. On 8/11 pt tolerated oxygen desat test and was deemed stable for step down.     OVERNIGHT EVENTS:    SUBJECTIVE / INTERVAL HPI: Patient seen and examined at bedside.     VITAL SIGNS:  Vital Signs Last 24 Hrs  T(C): 37.7 (11 Aug 2019 17:07), Max: 38.4 (11 Aug 2019 09:21)  T(F): 99.8 (11 Aug 2019 17:07), Max: 101.2 (11 Aug 2019 09:21)  HR: 84 (11 Aug 2019 16:00) (80 - 98)  BP: 112/62 (11 Aug 2019 16:00) (108/60 - 152/78)  BP(mean): 80 (11 Aug 2019 16:00) (77 - 107)  RR: 26 (11 Aug 2019 16:00) (14 - 40)  SpO2: 99% (11 Aug 2019 16:00) (92% - 100%)    PHYSICAL EXAM:    General: WDWN, NAD, saturating well on 2 L NC  HEENT: NC/AT; PERRL, anicteric sclera; MMM  Neck: supple  Cardiovascular: +S1/S2, RRR  Respiratory: bibasilar rhonchi, slightly tachypenic   Gastrointestinal: soft, NT/ND; +BSx4  Extremities: WWP; no edema, clubbing or cyanosis  Vascular: 2+ radial, DP/PT pulses B/L  Neurological: AAOx3; no focal deficits    MEDICATIONS:  MEDICATIONS  (STANDING):  chlorhexidine 2% Cloths 1 Application(s) Topical <User Schedule>  heparin  Infusion 1400 Unit(s)/Hr (14 mL/Hr) IV Continuous <Continuous>    MEDICATIONS  (PRN):  acetaminophen   Tablet .. 650 milliGRAM(s) Oral every 6 hours PRN Temp greater or equal to 38C (100.4F), Mild Pain (1 - 3)  benzonatate 100 milliGRAM(s) Oral three times a day PRN Cough -  1st line  guaiFENesin    Syrup 100 milliGRAM(s) Oral every 6 hours PRN Cough - 2nd line  HYDROmorphone  Injectable 2 milliGRAM(s) IV Push every 4 hours PRN Severe Pain (7 - 10)  HYDROmorphone  Injectable 1 milliGRAM(s) IV Push every 2 hours PRN Moderate Pain (4 - 6)      ALLERGIES:  Allergies    No Known Allergies    Intolerances        LABS:                        10.4   9.28  )-----------( 191      ( 11 Aug 2019 13:27 )             32.2     08-11    134<L>  |  95<L>  |  65<H>  ----------------------------<  76  5.0   |  23  |  10.21<H>    Ca    8.9      11 Aug 2019 05:53  Phos  6.1     08-11  Mg     2.1     08-11      PTT - ( 10 Aug 2019 17:55 )  PTT:61.4 sec    CAPILLARY BLOOD GLUCOSE          RADIOLOGY & ADDITIONAL TESTS: Reviewed. Transfer Acceptance Note from [facility] to [facility]:   47 yo M w/ESRD on HD T, Th, Sat via left sided AV fistula presenting complaining of shortness of breath, fevers, chills at dialysis. Found to have b/l PE with pulmonary infarcts. Patient also with severe sepsis likely 2/2 to HD fistula infection and resulting multifactorial respiratory compromise. Admitted to ICU for further monitoring. He was CTPE positive for B/L PE. Started on heparin drip, PTT 92 @ 6am and 83 @3pm. ECHO w EF 55-60% w nL RV and LV fxn. Met criteria for severe sepsis. BCx, UCx sent w suspicion for AV fistula infection given recent instrumentation to site on 8/6. HD done and started on Vanc. Vanc trough @ 5pm 12.9. Dosed Vanc 1250 x 1 after HD. Placed on bipap and started on 1mg q4h prn dilaudid to help pain w breathing.  Overnight, pt's heparin IV on R hand fell off, unclear duration and his 9pm was PTT 48.3; midnight PTT 57.1. His heparin drip increased to 17cc/hr. And was given tessalon pearls for cough. Later in the day his tachypnea worsened and was attempted to place BiPAP however refused BiPAP. He remained on NC and was given dilaudid for pain and RR improved. On 8/10 he remained on nasal cannula and his work of breathing was able to be controlled with dilaudid. His was PTT >200 at noon, so heparin was held and restarted at 1400. His PM PTT was 64 so he was then restarted on his heparin drip. No vanc was given over the weekend because HD was deferred by renal.  He was transitioned to eliquis per pharmacy and nephro recs. On 8/11 pt tolerated oxygen desat test and was deemed stable for step down.     OVERNIGHT EVENTS:    SUBJECTIVE / INTERVAL HPI: Patient seen and examined at bedside.     VITAL SIGNS:  Vital Signs Last 24 Hrs  T(C): 37.7 (11 Aug 2019 17:07), Max: 38.4 (11 Aug 2019 09:21)  T(F): 99.8 (11 Aug 2019 17:07), Max: 101.2 (11 Aug 2019 09:21)  HR: 84 (11 Aug 2019 16:00) (80 - 98)  BP: 112/62 (11 Aug 2019 16:00) (108/60 - 152/78)  BP(mean): 80 (11 Aug 2019 16:00) (77 - 107)  RR: 26 (11 Aug 2019 16:00) (14 - 40)  SpO2: 99% (11 Aug 2019 16:00) (92% - 100%)    PHYSICAL EXAM:    General: WDWN, NAD, saturating well on 2 L NC  HEENT: NC/AT; PERRL, anicteric sclera; MMM  Neck: supple  Cardiovascular: +S1/S2, RRR  Respiratory: bibasilar rhonchi, slightly tachypenic   Gastrointestinal: soft, NT/ND; +BSx4  Extremities: WWP; no edema, clubbing or cyanosis  Vascular: 2+ radial, DP/PT pulses B/L, L AVF with sutures in place, bandages clean/dry/inact   Neurological: AAOx3; no focal deficits    MEDICATIONS:  MEDICATIONS  (STANDING):  chlorhexidine 2% Cloths 1 Application(s) Topical <User Schedule>  heparin  Infusion 1400 Unit(s)/Hr (14 mL/Hr) IV Continuous <Continuous>    MEDICATIONS  (PRN):  acetaminophen   Tablet .. 650 milliGRAM(s) Oral every 6 hours PRN Temp greater or equal to 38C (100.4F), Mild Pain (1 - 3)  benzonatate 100 milliGRAM(s) Oral three times a day PRN Cough -  1st line  guaiFENesin    Syrup 100 milliGRAM(s) Oral every 6 hours PRN Cough - 2nd line  HYDROmorphone  Injectable 2 milliGRAM(s) IV Push every 4 hours PRN Severe Pain (7 - 10)  HYDROmorphone  Injectable 1 milliGRAM(s) IV Push every 2 hours PRN Moderate Pain (4 - 6)      ALLERGIES:  Allergies    No Known Allergies    Intolerances        LABS:                        10.4   9.28  )-----------( 191      ( 11 Aug 2019 13:27 )             32.2     08-11    134<L>  |  95<L>  |  65<H>  ----------------------------<  76  5.0   |  23  |  10.21<H>    Ca    8.9      11 Aug 2019 05:53  Phos  6.1     08-11  Mg     2.1     08-11      PTT - ( 10 Aug 2019 17:55 )  PTT:61.4 sec    CAPILLARY BLOOD GLUCOSE          RADIOLOGY & ADDITIONAL TESTS: Reviewed.

## 2019-08-11 NOTE — PROGRESS NOTE ADULT - SUBJECTIVE AND OBJECTIVE BOX
Patient is a 48y old  Male who presents with a chief complaint of pulmonary embolism (10 Aug 2019 10:18)        INTERVAL HPI/OVERNIGHT EVENTS: none    SYMPTOMS significant pleurtiic chest pain with sob    DRIPS none        ICU Vital Signs Last 24 Hrs  T(C): 38.4 (11 Aug 2019 09:21), Max: 38.4 (11 Aug 2019 09:21)  T(F): 101.2 (11 Aug 2019 09:21), Max: 101.2 (11 Aug 2019 09:21)  HR: 96 (11 Aug 2019 10:00) (78 - 98)  BP: 124/65 (11 Aug 2019 10:00) (112/61 - 152/78)  BP(mean): 83 (11 Aug 2019 10:00) (77 - 107)  ABP: --  ABP(mean): --  RR: 28 (11 Aug 2019 10:00) (14 - 40)  SpO2: 96% (11 Aug 2019 10:00) (92% - 100%)      I&O's Summary    10 Aug 2019 07:01  -  11 Aug 2019 07:00  --------------------------------------------------------  IN: 183 mL / OUT: 1150 mL / NET: -967 mL    11 Aug 2019 07:01  -  11 Aug 2019 11:00  --------------------------------------------------------  IN: 0 mL / OUT: 200 mL / NET: -200 mL        EXAM    Chest few ronchi    Heart rr    Abdomen soft nontender with bs    Extremities with edema    Neuro awake, alert      LABS:                            10.3   7.93  )-----------( 151      ( 10 Aug 2019 11:32 )             31.9     08-11    134<L>  |  95<L>  |  65<H>  ----------------------------<  76  5.0   |  23  |  10.21<H>    Ca    8.9      11 Aug 2019 05:53  Phos  6.1     08-11  Mg     2.1     08-11      PTT - ( 10 Aug 2019 17:55 )  PTT:61.4 sec      cultures neg    RADIOLOGY & ADDITIONAL STUDIES:    CRITICAL CARE TIME SPENT:

## 2019-08-11 NOTE — PHYSICAL THERAPY INITIAL EVALUATION ADULT - ADDITIONAL COMMENTS
Patient states he lives in a private house with a friend. Prior to admission, patient states he was independent for all functional mobility and ADLs without assistive device.

## 2019-08-11 NOTE — PROGRESS NOTE ADULT - SUBJECTIVE AND OBJECTIVE BOX
INTERVAL HPI/OVERNIGHT EVENTS:    SUBJECTIVE: Patient seen and examined at bedside.    OBJECTIVE:    VITAL SIGNS:  ICU Vital Signs Last 24 Hrs  T(C): 38.4 (11 Aug 2019 09:21), Max: 38.4 (11 Aug 2019 09:21)  T(F): 101.2 (11 Aug 2019 09:21), Max: 101.2 (11 Aug 2019 09:21)  HR: 98 (11 Aug 2019 09:00) (78 - 98)  BP: 140/76 (11 Aug 2019 09:00) (112/61 - 152/78)  BP(mean): 101 (11 Aug 2019 09:00) (77 - 107)  ABP: --  ABP(mean): --  RR: 40 (11 Aug 2019 09:00) (14 - 40)  SpO2: 97% (11 Aug 2019 09:00) (92% - 100%)        08-10 @ 07:01 - 08-11 @ 07:00  --------------------------------------------------------  IN: 183 mL / OUT: 1150 mL / NET: -967 mL    08-11 @ 07:01 - 08-11 @ 10:12  --------------------------------------------------------  IN: 0 mL / OUT: 200 mL / NET: -200 mL      CAPILLARY BLOOD GLUCOSE          PHYSICAL EXAM:    General: NAD  HEENT: NC/AT; PERRL, clear conjunctiva  Neck: supple  Respiratory: CTA b/l  Cardiovascular: +S1/S2; RRR  Abdomen: soft, NT/ND; +BS x4  Extremities: WWP, 2+ peripheral pulses b/l; no LE edema  Skin: normal color and turgor; no rash  Neurological:     MEDICATIONS:  MEDICATIONS  (STANDING):  apixaban 10 milliGRAM(s) Oral every 12 hours  chlorhexidine 2% Cloths 1 Application(s) Topical <User Schedule>    MEDICATIONS  (PRN):  acetaminophen   Tablet .. 650 milliGRAM(s) Oral every 6 hours PRN Temp greater or equal to 38C (100.4F), Mild Pain (1 - 3)  HYDROmorphone  Injectable 2 milliGRAM(s) IV Push every 4 hours PRN Severe Pain (7 - 10)  HYDROmorphone  Injectable 1 milliGRAM(s) IV Push every 2 hours PRN Moderate Pain (4 - 6)      ALLERGIES:  Allergies    No Known Allergies    Intolerances        LABS:                        10.3   7.93  )-----------( 151      ( 10 Aug 2019 11:32 )             31.9     08-11    134<L>  |  95<L>  |  65<H>  ----------------------------<  76  5.0   |  23  |  10.21<H>    Ca    8.9      11 Aug 2019 05:53  Phos  6.1     08-11  Mg     2.1     08-11      PTT - ( 10 Aug 2019 17:55 )  PTT:61.4 sec      RADIOLOGY & ADDITIONAL TESTS: Reviewed. INTERVAL HPI/OVERNIGHT EVENTS: NAEO    SUBJECTIVE: Patient seen and examined at bedside. tachypneic breathing on 4.5L NC. Still c/o pleuritic chest pain     OBJECTIVE:    VITAL SIGNS:  ICU Vital Signs Last 24 Hrs  T(C): 38.4 (11 Aug 2019 09:21), Max: 38.4 (11 Aug 2019 09:21)  T(F): 101.2 (11 Aug 2019 09:21), Max: 101.2 (11 Aug 2019 09:21)  HR: 98 (11 Aug 2019 09:00) (78 - 98)  BP: 140/76 (11 Aug 2019 09:00) (112/61 - 152/78)  BP(mean): 101 (11 Aug 2019 09:00) (77 - 107)  ABP: --  ABP(mean): --  RR: 40 (11 Aug 2019 09:00) (14 - 40)  SpO2: 97% (11 Aug 2019 09:00) (92% - 100%)        08-10 @ 07:01 - 08-11 @ 07:00  --------------------------------------------------------  IN: 183 mL / OUT: 1150 mL / NET: -967 mL    08-11 @ 07:01  -  08-11 @ 10:12  --------------------------------------------------------  IN: 0 mL / OUT: 200 mL / NET: -200 mL      CAPILLARY BLOOD GLUCOSE          PHYSICAL EXAM:    General: NAD, off drips, extubated on NC 4.5L  HEENT: NC/AT; PERRL, clear conjunctiva  Neck: supple  Respiratory: bibasilar rhonchi, increased WOB, using accessory muscles   Cardiovascular: +S1/S2; RRR  Abdomen: soft, NT/ND; +BS x4  Extremities: WWP, 2+ peripheral pulses b/l; no LE edema  Skin: normal color and turgor; no rash  Neurological: AAOx3    MEDICATIONS:  MEDICATIONS  (STANDING):  apixaban 10 milliGRAM(s) Oral every 12 hours  chlorhexidine 2% Cloths 1 Application(s) Topical <User Schedule>    MEDICATIONS  (PRN):  acetaminophen   Tablet .. 650 milliGRAM(s) Oral every 6 hours PRN Temp greater or equal to 38C (100.4F), Mild Pain (1 - 3)  HYDROmorphone  Injectable 2 milliGRAM(s) IV Push every 4 hours PRN Severe Pain (7 - 10)  HYDROmorphone  Injectable 1 milliGRAM(s) IV Push every 2 hours PRN Moderate Pain (4 - 6)      ALLERGIES:  Allergies    No Known Allergies    Intolerances        LABS:                        10.3   7.93  )-----------( 151      ( 10 Aug 2019 11:32 )             31.9     08-11    134<L>  |  95<L>  |  65<H>  ----------------------------<  76  5.0   |  23  |  10.21<H>    Ca    8.9      11 Aug 2019 05:53  Phos  6.1     08-11  Mg     2.1     08-11      PTT - ( 10 Aug 2019 17:55 )  PTT:61.4 sec      RADIOLOGY & ADDITIONAL TESTS: Reviewed. TRANSFER NOTE MICU TO   Hospital Course  47 yo M w/ESRD on HD T, Th, Sat via left sided AV fistula presenting complaining of shortness of breath, fevers, chills at dialysis. Found to have b/l PE with pulmonary infarcts. Patient also with severe sepsis likely 2/2 to HD fistula infection and resulting multifactorial respiratory compromise. Admitted to ICU for further monitoring. He was CTPE positive for B/L PE. Started on heparin drip, PTT 92 @ 6am and 83 @3pm. ECHO w EF 55-60% w nL RV and LV fxn. Met criteria for severe sepsis. BCx, UCx sent w suspicion for AV fistula infection given recent instrumentation to site on 8/6. HD done and started on Vanc. Vanc trough @ 5pm 12.9. Dosed Vanc 1250 x 1 after HD. Placed on bipap and started on 1mg q4h prn dilaudid to help pain w breathing.  Overnight, pt's heparin IV on R hand fell off, unclear duration and his 9pm was PTT 48.3; midnight PTT 57.1. His heparin drip increased to 17cc/hr. And was given tessalon pearls for cough. Later in the day his tachypnea worsened and was attempted to place BiPAP however refused BiPAP. He remained on NC and was given dilaudid for pain and RR improved. On 8/10 he remained on nasal cannula and his work of breathing was able to be controlled with dilaudid. His was PTT >200 at noon, so heparin was held and restarted at 1400. His PM PTT was 64 so he was then restarted on his heparin drip. No vanc was given over the weekend because HD was deferred by renal.  He was transitioned to eliquis per pharmacy and nephro recs. On 8/11 pt tolerated oxygen desat test and was deemed stable for step down.     INTERVAL HPI/OVERNIGHT EVENTS: NAEO    SUBJECTIVE: Patient seen and examined at bedside. tachypneic breathing on 4.5L NC. Still c/o pleuritic chest pain     OBJECTIVE:    VITAL SIGNS:  ICU Vital Signs Last 24 Hrs  T(C): 38.4 (11 Aug 2019 09:21), Max: 38.4 (11 Aug 2019 09:21)  T(F): 101.2 (11 Aug 2019 09:21), Max: 101.2 (11 Aug 2019 09:21)  HR: 98 (11 Aug 2019 09:00) (78 - 98)  BP: 140/76 (11 Aug 2019 09:00) (112/61 - 152/78)  BP(mean): 101 (11 Aug 2019 09:00) (77 - 107)  ABP: --  ABP(mean): --  RR: 40 (11 Aug 2019 09:00) (14 - 40)  SpO2: 97% (11 Aug 2019 09:00) (92% - 100%)        08-10 @ 07:01  -  08-11 @ 07:00  --------------------------------------------------------  IN: 183 mL / OUT: 1150 mL / NET: -967 mL    08-11 @ 07:01  -  08-11 @ 10:12  --------------------------------------------------------  IN: 0 mL / OUT: 200 mL / NET: -200 mL      CAPILLARY BLOOD GLUCOSE          PHYSICAL EXAM:    General: NAD, off drips, extubated on NC 4.5L  HEENT: NC/AT; PERRL, clear conjunctiva  Neck: supple  Respiratory: bibasilar rhonchi, increased WOB, using accessory muscles   Cardiovascular: +S1/S2; RRR  Abdomen: soft, NT/ND; +BS x4  Extremities: WWP, 2+ peripheral pulses b/l; no LE edema  Skin: normal color and turgor; no rash  Neurological: AAOx3    MEDICATIONS:  MEDICATIONS  (STANDING):  apixaban 10 milliGRAM(s) Oral every 12 hours  chlorhexidine 2% Cloths 1 Application(s) Topical <User Schedule>    MEDICATIONS  (PRN):  acetaminophen   Tablet .. 650 milliGRAM(s) Oral every 6 hours PRN Temp greater or equal to 38C (100.4F), Mild Pain (1 - 3)  HYDROmorphone  Injectable 2 milliGRAM(s) IV Push every 4 hours PRN Severe Pain (7 - 10)  HYDROmorphone  Injectable 1 milliGRAM(s) IV Push every 2 hours PRN Moderate Pain (4 - 6)      ALLERGIES:  Allergies    No Known Allergies    Intolerances        LABS:                        10.3   7.93  )-----------( 151      ( 10 Aug 2019 11:32 )             31.9     08-11    134<L>  |  95<L>  |  65<H>  ----------------------------<  76  5.0   |  23  |  10.21<H>    Ca    8.9      11 Aug 2019 05:53  Phos  6.1     08-11  Mg     2.1     08-11      PTT - ( 10 Aug 2019 17:55 )  PTT:61.4 sec      RADIOLOGY & ADDITIONAL TESTS: Reviewed.

## 2019-08-11 NOTE — PHYSICAL THERAPY INITIAL EVALUATION ADULT - PATIENT/FAMILY/SIGNIFICANT OTHER GOALS STATEMENT, PT EVAL
Patient is willing and motivated to participate in physical therapy and wants to be able to breathe easier

## 2019-08-11 NOTE — PROGRESS NOTE ADULT - PROBLEM SELECTOR PLAN 1
CTPE with e/o bilateral occlusive segmental and subsegmental PE with developing bilateral pulmonary infarct, likely 2/2 LUE AV fistula in setting of known thrombectomy. Echo without evidence of R heart strain. Echo with EF 55-60%, normal RV and LV function, repeated by cardio fellow today. Received Eliquis this AM, however will need to be bridged with coumadin.  - restart heparin gtt tonight at 21:00 and dose for Coumadin  - f/u PT/PTT/INR   - CTPE with e/o bilateral occlusive segmental and subsegmental PE with developing bilateral pulmonary infarct, likely 2/2 LUE AV fistula in setting of known thrombectomy. Echo without evidence of R heart strain. Echo with EF 55-60%, normal RV and LV function, repeated by cardio fellow today. Received Eliquis this AM, however will need to be bridged with coumadin.  - restart heparin gtt tonight at 21:00 and dose for Coumadin  - f/u PT/PTT/INR   - vascular following- no plan for intervention at this time CTPE with e/o bilateral occlusive segmental and subsegmental PE with developing bilateral pulmonary infarct, likely 2/2 LUE AV fistula in setting of known thrombectomy. Echo without evidence of R heart strain. Echo with EF 55-60%, normal RV and LV function, repeated by cardio fellow today. Received Eliquis this AM, however will need to be bridged with coumadin.  - restart heparin gtt tonight at 21:00 and dose for Coumadin 2.5 mg   - f/u PT/PTT/INR   - vascular following- no plan for intervention at this time

## 2019-08-11 NOTE — PROGRESS NOTE ADULT - PROBLEM SELECTOR PLAN 2
Pt with ESRD on HD, last (full session) on 8/10/19. Unclear etiology of ESRD.   - holding home calcium acetate for now, re-start when patient can take PO meds  - no HD today per renal Pt with ESRD on HD, last (full session) on 8/09/19. Unclear etiology of ESRD.   - holding home calcium acetate for now, re-start when patient can take PO meds  - no HD today per renal

## 2019-08-12 DIAGNOSIS — N18.9 CHRONIC KIDNEY DISEASE, UNSPECIFIED: ICD-10-CM

## 2019-08-12 DIAGNOSIS — E87.2 ACIDOSIS: ICD-10-CM

## 2019-08-12 DIAGNOSIS — T82.868A THROMBOSIS DUE TO VASCULAR PROSTHETIC DEVICES, IMPLANTS AND GRAFTS, INITIAL ENCOUNTER: ICD-10-CM

## 2019-08-12 DIAGNOSIS — E66.9 OBESITY, UNSPECIFIED: ICD-10-CM

## 2019-08-12 DIAGNOSIS — D63.1 ANEMIA IN CHRONIC KIDNEY DISEASE: ICD-10-CM
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
## 2019-08-12 DIAGNOSIS — I12.0 HYPERTENSIVE CHRONIC KIDNEY DISEASE WITH STAGE 5 CHRONIC KIDNEY DISEASE OR END STAGE RENAL DISEASE: ICD-10-CM

## 2019-08-12 DIAGNOSIS — N18.6 END STAGE RENAL DISEASE: ICD-10-CM

## 2019-08-12 DIAGNOSIS — E87.5 HYPERKALEMIA: ICD-10-CM

## 2019-08-12 LAB
ALBUMIN SERPL ELPH-MCNC: 3.1 G/DL — LOW (ref 3.3–5)
ALP SERPL-CCNC: 58 U/L — SIGNIFICANT CHANGE UP (ref 40–120)
ALT FLD-CCNC: 9 U/L — LOW (ref 10–45)
ANION GAP SERPL CALC-SCNC: 18 MMOL/L — HIGH (ref 5–17)
ANION GAP SERPL CALC-SCNC: 18 MMOL/L — HIGH (ref 5–17)
APTT BLD: 39.6 SEC — HIGH (ref 27.5–36.3)
APTT BLD: 40.2 SEC — HIGH (ref 27.5–36.3)
AST SERPL-CCNC: 16 U/L — SIGNIFICANT CHANGE UP (ref 10–40)
BILIRUB SERPL-MCNC: 0.3 MG/DL — SIGNIFICANT CHANGE UP (ref 0.2–1.2)
BUN SERPL-MCNC: 31 MG/DL — HIGH (ref 7–23)
BUN SERPL-MCNC: 85 MG/DL — HIGH (ref 7–23)
BUN SERPL-MCNC: 89 MG/DL — HIGH (ref 7–23)
BUN SERPL-MCNC: 89 MG/DL — HIGH (ref 7–23)
CALCIUM SERPL-MCNC: 8.9 MG/DL — SIGNIFICANT CHANGE UP (ref 8.4–10.5)
CALCIUM SERPL-MCNC: 9.2 MG/DL — SIGNIFICANT CHANGE UP (ref 8.4–10.5)
CHLORIDE SERPL-SCNC: 94 MMOL/L — LOW (ref 96–108)
CHLORIDE SERPL-SCNC: 95 MMOL/L — LOW (ref 96–108)
CK MB CFR SERPL CALC: <1 NG/ML — SIGNIFICANT CHANGE UP (ref 0–6.7)
CK SERPL-CCNC: 61 U/L — SIGNIFICANT CHANGE UP (ref 30–200)
CK SERPL-CCNC: 66 U/L — SIGNIFICANT CHANGE UP (ref 30–200)
CO2 SERPL-SCNC: 22 MMOL/L — SIGNIFICANT CHANGE UP (ref 22–31)
CO2 SERPL-SCNC: 23 MMOL/L — SIGNIFICANT CHANGE UP (ref 22–31)
CREAT SERPL-MCNC: 12.11 MG/DL — HIGH (ref 0.5–1.3)
CREAT SERPL-MCNC: 12.85 MG/DL — HIGH (ref 0.5–1.3)
GLUCOSE SERPL-MCNC: 82 MG/DL — SIGNIFICANT CHANGE UP (ref 70–99)
GLUCOSE SERPL-MCNC: 90 MG/DL — SIGNIFICANT CHANGE UP (ref 70–99)
HCT VFR BLD CALC: 27.8 % — LOW (ref 39–50)
HGB BLD-MCNC: 9.1 G/DL — LOW (ref 13–17)
INR BLD: 1.38 — HIGH (ref 0.88–1.16)
INR BLD: 1.5 — HIGH (ref 0.88–1.16)
MAGNESIUM SERPL-MCNC: 2.2 MG/DL — SIGNIFICANT CHANGE UP (ref 1.6–2.6)
MAGNESIUM SERPL-MCNC: 2.2 MG/DL — SIGNIFICANT CHANGE UP (ref 1.6–2.6)
MCHC RBC-ENTMCNC: 28.3 PG — SIGNIFICANT CHANGE UP (ref 27–34)
MCHC RBC-ENTMCNC: 32.7 GM/DL — SIGNIFICANT CHANGE UP (ref 32–36)
MCV RBC AUTO: 86.6 FL — SIGNIFICANT CHANGE UP (ref 80–100)
NRBC # BLD: 0 /100 WBCS — SIGNIFICANT CHANGE UP (ref 0–0)
PHOSPHATE SERPL-MCNC: 5.9 MG/DL — HIGH (ref 2.5–4.5)
PHOSPHATE SERPL-MCNC: 6 MG/DL — HIGH (ref 2.5–4.5)
PLATELET # BLD AUTO: 201 K/UL — SIGNIFICANT CHANGE UP (ref 150–400)
POTASSIUM SERPL-MCNC: 4.7 MMOL/L — SIGNIFICANT CHANGE UP (ref 3.5–5.3)
POTASSIUM SERPL-MCNC: 4.8 MMOL/L — SIGNIFICANT CHANGE UP (ref 3.5–5.3)
POTASSIUM SERPL-SCNC: 4.7 MMOL/L — SIGNIFICANT CHANGE UP (ref 3.5–5.3)
POTASSIUM SERPL-SCNC: 4.8 MMOL/L — SIGNIFICANT CHANGE UP (ref 3.5–5.3)
PROT SERPL-MCNC: 7.9 G/DL — SIGNIFICANT CHANGE UP (ref 6–8.3)
PROTHROM AB SERPL-ACNC: 15.8 SEC — HIGH (ref 10–12.9)
PROTHROM AB SERPL-ACNC: 17.2 SEC — HIGH (ref 10–12.9)
RBC # BLD: 3.21 M/UL — LOW (ref 4.2–5.8)
RBC # FLD: 14.6 % — HIGH (ref 10.3–14.5)
SODIUM SERPL-SCNC: 134 MMOL/L — LOW (ref 135–145)
SODIUM SERPL-SCNC: 136 MMOL/L — SIGNIFICANT CHANGE UP (ref 135–145)
TROPONIN T SERPL-MCNC: 0.06 NG/ML — CRITICAL HIGH (ref 0–0.01)
TROPONIN T SERPL-MCNC: 0.06 NG/ML — CRITICAL HIGH (ref 0–0.01)
VANCOMYCIN TROUGH SERPL-MCNC: 23.3 UG/ML — HIGH (ref 10–20)
WBC # BLD: 8.75 K/UL — SIGNIFICANT CHANGE UP (ref 3.8–10.5)
WBC # FLD AUTO: 8.75 K/UL — SIGNIFICANT CHANGE UP (ref 3.8–10.5)

## 2019-08-12 PROCEDURE — 99223 1ST HOSP IP/OBS HIGH 75: CPT

## 2019-08-12 PROCEDURE — 99233 SBSQ HOSP IP/OBS HIGH 50: CPT | Mod: GC

## 2019-08-12 PROCEDURE — 71045 X-RAY EXAM CHEST 1 VIEW: CPT | Mod: 26

## 2019-08-12 PROCEDURE — 90935 HEMODIALYSIS ONE EVALUATION: CPT | Mod: GC

## 2019-08-12 RX ORDER — PANTOPRAZOLE SODIUM 20 MG/1
40 TABLET, DELAYED RELEASE ORAL ONCE
Refills: 0 | Status: COMPLETED | OUTPATIENT
Start: 2019-08-12 | End: 2019-08-12

## 2019-08-12 RX ORDER — HEPARIN SODIUM 5000 [USP'U]/ML
1800 INJECTION INTRAVENOUS; SUBCUTANEOUS
Qty: 25000 | Refills: 0 | Status: DISCONTINUED | OUTPATIENT
Start: 2019-08-12 | End: 2019-08-12

## 2019-08-12 RX ORDER — HEPARIN SODIUM 5000 [USP'U]/ML
2000 INJECTION INTRAVENOUS; SUBCUTANEOUS
Qty: 25000 | Refills: 0 | Status: DISCONTINUED | OUTPATIENT
Start: 2019-08-12 | End: 2019-08-12

## 2019-08-12 RX ORDER — APIXABAN 2.5 MG/1
5 TABLET, FILM COATED ORAL EVERY 12 HOURS
Refills: 0 | Status: DISCONTINUED | OUTPATIENT
Start: 2019-08-12 | End: 2019-08-17

## 2019-08-12 RX ORDER — PANTOPRAZOLE SODIUM 20 MG/1
40 TABLET, DELAYED RELEASE ORAL
Refills: 0 | Status: DISCONTINUED | OUTPATIENT
Start: 2019-08-13 | End: 2019-08-17

## 2019-08-12 RX ADMIN — Medication 100 MILLIGRAM(S): at 03:01

## 2019-08-12 RX ADMIN — HEPARIN SODIUM 1800 UNIT(S)/HR: 5000 INJECTION INTRAVENOUS; SUBCUTANEOUS at 12:22

## 2019-08-12 RX ADMIN — HYDROMORPHONE HYDROCHLORIDE 1 MILLIGRAM(S): 2 INJECTION INTRAMUSCULAR; INTRAVENOUS; SUBCUTANEOUS at 00:31

## 2019-08-12 RX ADMIN — HEPARIN SODIUM 18 UNIT(S)/HR: 5000 INJECTION INTRAVENOUS; SUBCUTANEOUS at 05:49

## 2019-08-12 RX ADMIN — HYDROMORPHONE HYDROCHLORIDE 2 MILLIGRAM(S): 2 INJECTION INTRAMUSCULAR; INTRAVENOUS; SUBCUTANEOUS at 20:34

## 2019-08-12 RX ADMIN — HYDROMORPHONE HYDROCHLORIDE 2 MILLIGRAM(S): 2 INJECTION INTRAMUSCULAR; INTRAVENOUS; SUBCUTANEOUS at 21:00

## 2019-08-12 RX ADMIN — HYDROMORPHONE HYDROCHLORIDE 1 MILLIGRAM(S): 2 INJECTION INTRAMUSCULAR; INTRAVENOUS; SUBCUTANEOUS at 00:04

## 2019-08-12 RX ADMIN — HYDROMORPHONE HYDROCHLORIDE 2 MILLIGRAM(S): 2 INJECTION INTRAMUSCULAR; INTRAVENOUS; SUBCUTANEOUS at 04:39

## 2019-08-12 RX ADMIN — PANTOPRAZOLE SODIUM 40 MILLIGRAM(S): 20 TABLET, DELAYED RELEASE ORAL at 11:11

## 2019-08-12 RX ADMIN — APIXABAN 5 MILLIGRAM(S): 2.5 TABLET, FILM COATED ORAL at 21:49

## 2019-08-12 NOTE — PROGRESS NOTE ADULT - SUBJECTIVE AND OBJECTIVE BOX
Hospital Course:   47 yo M w/ESRD on HD T, Th, Sat via left sided AV fistula presenting complaining of shortness of breath, fevers, chills at dialysis. Found to have b/l PE with pulmonary infarcts. Patient also with severe sepsis likely 2/2 to HD fistula infection and resulting multifactorial respiratory compromise. Admitted to ICU for further monitoring. He was CTPE positive for B/L PE. Started on heparin drip, PTT 92 @ 6am and 83 @3pm. ECHO w EF 55-60% w nL RV and LV fxn. Met criteria for severe sepsis. BCx, UCx sent w suspicion for AV fistula infection given recent instrumentation to site on 8/6. HD done and started on Vanc. Vanc trough @ 5pm 12.9. Dosed Vanc 1250 x 1 after HD. Placed on bipap and started on 1mg q4h prn dilaudid to help pain w breathing.  Overnight, pt's heparin IV on R hand fell off, unclear duration and his 9pm was PTT 48.3; midnight PTT 57.1. His heparin drip increased to 17cc/hr. And was given tessalon pearls for cough. Later in the day his tachypnea worsened and was attempted to place BiPAP however refused BiPAP. He remained on NC and was given dilaudid for pain and RR improved. On 8/10 he remained on nasal cannula and his work of breathing was able to be controlled with dilaudid. His was PTT >200 at noon, so heparin was held and restarted at 1400. His PM PTT was 64 so he was then restarted on his heparin drip. No vanc was given over the weekend because HD was deferred by renal.  He was transitioned to eliquis per pharmacy and nephro recs. On 8/11 pt tolerated oxygen desat test and was deemed stable for step down.     OVERNIGHT EVENTS:    SUBJECTIVE / INTERVAL HPI: Patient seen and examined at bedside.     ICU Vital Signs Last 24 Hrs  T(C): 37.4 (12 Aug 2019 05:00), Max: 38.4 (11 Aug 2019 09:21)  T(F): 99.4 (12 Aug 2019 05:00), Max: 101.2 (11 Aug 2019 09:21)  HR: 96 (12 Aug 2019 04:40) (82 - 98)  BP: 125/66 (12 Aug 2019 04:40) (108/60 - 140/76)  BP(mean): 89 (12 Aug 2019 04:40) (77 - 101)  RR: 24 (12 Aug 2019 00:25) (14 - 40)  SpO2: 94% (12 Aug 2019 04:40) (92% - 99%)      PHYSICAL EXAM:  General: WDWN, NAD, saturating well on 2 L NC  HEENT: NC/AT; PERRL, anicteric sclera; MMM  Neck: supple  Cardiovascular: +S1/S2, RRR  Respiratory: bibasilar rhonchi, slightly tachypenic   Gastrointestinal: soft, NT/ND; +BSx4  Extremities: WWP; no edema, clubbing or cyanosis  Vascular: 2+ radial, DP/PT pulses B/L, L AVF with sutures in place, bandages clean/dry/inact   Neurological: AAOx3; no focal deficits    MEDICATIONS:  MEDICATIONS  (STANDING):  chlorhexidine 2% Cloths 1 Application(s) Topical <User Schedule>  heparin  Infusion 1400 Unit(s)/Hr (14 mL/Hr) IV Continuous <Continuous>    MEDICATIONS  (PRN):  acetaminophen   Tablet .. 650 milliGRAM(s) Oral every 6 hours PRN Temp greater or equal to 38C (100.4F), Mild Pain (1 - 3)  benzonatate 100 milliGRAM(s) Oral three times a day PRN Cough -  1st line  guaiFENesin    Syrup 100 milliGRAM(s) Oral every 6 hours PRN Cough - 2nd line  HYDROmorphone  Injectable 2 milliGRAM(s) IV Push every 4 hours PRN Severe Pain (7 - 10)  HYDROmorphone  Injectable 1 milliGRAM(s) IV Push every 2 hours PRN Moderate Pain (4 - 6)      ALLERGIES:  Allergies    No Known Allergies    Intolerances        LABS:                        10.4   9.28  )-----------( 191      ( 11 Aug 2019 13:27 )             32.2     08-11    134<L>  |  95<L>  |  65<H>  ----------------------------<  76  5.0   |  23  |  10.21<H>    Ca    8.9      11 Aug 2019 05:53  Phos  6.1     08-11  Mg     2.1     08-11      PTT - ( 10 Aug 2019 17:55 )  PTT:61.4 sec    CAPILLARY BLOOD GLUCOSE          RADIOLOGY & ADDITIONAL TESTS: Reviewed. Hospital Course:   49 yo M w/ESRD on HD T, Th, Sat via left sided AV fistula presenting complaining of shortness of breath, fevers, chills at dialysis. Found to have b/l PE with pulmonary infarcts. Patient also with severe sepsis likely 2/2 to HD fistula infection and resulting multifactorial respiratory compromise. Admitted to ICU for further monitoring. He was CTPE positive for B/L PE. Started on heparin drip, PTT 92 @ 6am and 83 @3pm. ECHO w EF 55-60% w nL RV and LV fxn. Met criteria for severe sepsis. BCx, UCx sent w suspicion for AV fistula infection given recent instrumentation to site on 8/6. HD done and started on Vanc. Vanc trough @ 5pm 12.9. Dosed Vanc 1250 x 1 after HD. Placed on bipap and started on 1mg q4h prn dilaudid to help pain w breathing.  Overnight, pt's heparin IV on R hand fell off, unclear duration and his 9pm was PTT 48.3; midnight PTT 57.1. His heparin drip increased to 17cc/hr. And was given tessalon pearls for cough. Later in the day his tachypnea worsened and was attempted to place BiPAP however refused BiPAP. He remained on NC and was given dilaudid for pain and RR improved. On 8/10 he remained on nasal cannula and his work of breathing was able to be controlled with dilaudid. His was PTT >200 at noon, so heparin was held and restarted at 1400. His PM PTT was 64 so he was then restarted on his heparin drip. No vanc was given over the weekend because HD was deferred by renal.  He was transitioned to eliquis per pharmacy and nephro recs. On 8/11 pt tolerated oxygen desat test and was deemed stable for step down.     OVERNIGHT EVENTS: Complained of chest pain overnight    SUBJECTIVE / INTERVAL HPI: Patient seen and examined at bedside.     ICU Vital Signs Last 24 Hrs  T(C): 37.4 (12 Aug 2019 05:00), Max: 38.4 (11 Aug 2019 09:21)  T(F): 99.4 (12 Aug 2019 05:00), Max: 101.2 (11 Aug 2019 09:21)  HR: 96 (12 Aug 2019 04:40) (82 - 98)  BP: 125/66 (12 Aug 2019 04:40) (108/60 - 140/76)  BP(mean): 89 (12 Aug 2019 04:40) (77 - 101)  RR: 24 (12 Aug 2019 00:25) (14 - 40)  SpO2: 94% (12 Aug 2019 04:40) (92% - 99%)      PHYSICAL EXAM:  General: WDWN, NAD, saturating well on 2 L NC  HEENT: NC/AT; PERRL, anicteric sclera; MMM  Neck: supple  Cardiovascular: +S1/S2, RRR  Respiratory: bibasilar rhonchi, slightly tachypenic   Gastrointestinal: soft, NT/ND; +BSx4  Extremities: WWP; no edema, clubbing or cyanosis  Vascular: 2+ radial, DP/PT pulses B/L, L AVF with sutures in place, bandages clean/dry/inact   Neurological: AAOx3; no focal deficits    MEDICATIONS:  MEDICATIONS  (STANDING):  chlorhexidine 2% Cloths 1 Application(s) Topical <User Schedule>  heparin  Infusion 1400 Unit(s)/Hr (14 mL/Hr) IV Continuous <Continuous>    MEDICATIONS  (PRN):  acetaminophen   Tablet .. 650 milliGRAM(s) Oral every 6 hours PRN Temp greater or equal to 38C (100.4F), Mild Pain (1 - 3)  benzonatate 100 milliGRAM(s) Oral three times a day PRN Cough -  1st line  guaiFENesin    Syrup 100 milliGRAM(s) Oral every 6 hours PRN Cough - 2nd line  HYDROmorphone  Injectable 2 milliGRAM(s) IV Push every 4 hours PRN Severe Pain (7 - 10)  HYDROmorphone  Injectable 1 milliGRAM(s) IV Push every 2 hours PRN Moderate Pain (4 - 6)      ALLERGIES:  Allergies    No Known Allergies    Intolerances        LABS:                        10.4   9.28  )-----------( 191      ( 11 Aug 2019 13:27 )             32.2     08-11    134<L>  |  95<L>  |  65<H>  ----------------------------<  76  5.0   |  23  |  10.21<H>    Ca    8.9      11 Aug 2019 05:53  Phos  6.1     08-11  Mg     2.1     08-11      PTT - ( 10 Aug 2019 17:55 )  PTT:61.4 sec    CAPILLARY BLOOD GLUCOSE          RADIOLOGY & ADDITIONAL TESTS: Reviewed. Hospital Course:   47 yo M w/ESRD on HD T, Th, Sat via left sided AV fistula presenting complaining of shortness of breath, fevers, chills at dialysis. Found to have b/l PE with pulmonary infarcts. Patient also with severe sepsis likely 2/2 to HD fistula infection and resulting multifactorial respiratory compromise. Admitted to ICU for further monitoring. He was CTPE positive for B/L PE. Started on heparin drip, PTT 92 @ 6am and 83 @3pm. ECHO w EF 55-60% w nL RV and LV fxn. Met criteria for severe sepsis. BCx, UCx sent w suspicion for AV fistula infection given recent instrumentation to site on 8/6. HD done and started on Vanc. Vanc trough @ 5pm 12.9. Dosed Vanc 1250 x 1 after HD. Placed on bipap and started on 1mg q4h prn dilaudid to help pain w breathing.  Overnight, pt's heparin IV on R hand fell off, unclear duration and his 9pm was PTT 48.3; midnight PTT 57.1. His heparin drip increased to 17cc/hr. And was given tessalon pearls for cough. Later in the day his tachypnea worsened and was attempted to place BiPAP however refused BiPAP. He remained on NC and was given dilaudid for pain and RR improved. On 8/10 he remained on nasal cannula and his work of breathing was able to be controlled with dilaudid. His was PTT >200 at noon, so heparin was held and restarted at 1400. His PM PTT was 64 so he was then restarted on his heparin drip. No vanc was given over the weekend because HD was deferred by renal.  He was transitioned to eliquis per pharmacy and nephro recs. On 8/11 pt tolerated oxygen desat test and was deemed stable for step down.     OVERNIGHT EVENTS: Complained of chest pain overnight. EKG and Trp ordered.    SUBJECTIVE / INTERVAL HPI: Mr. Soares states he has pain when taking a deep breath, but otherwise has no complaints and his chest pain has improved. Denies fever/chills, Nausea/vomiting.     ICU Vital Signs Last 24 Hrs  T(C): 37.4 (12 Aug 2019 05:00), Max: 38.4 (11 Aug 2019 09:21)  T(F): 99.4 (12 Aug 2019 05:00), Max: 101.2 (11 Aug 2019 09:21)  HR: 96 (12 Aug 2019 04:40) (82 - 98)  BP: 125/66 (12 Aug 2019 04:40) (108/60 - 140/76)  BP(mean): 89 (12 Aug 2019 04:40) (77 - 101)  RR: 24 (12 Aug 2019 00:25) (14 - 40)  SpO2: 94% (12 Aug 2019 04:40) (92% - 99%)      PHYSICAL EXAM:  General: diaphoretic, saturating well on 2 L NC  HEENT: NC/AT; PERRL, anicteric sclera; MMM  Neck: supple  Cardiovascular: +S1/S2, RRR  Respiratory: bibasilar rhonchi, tachypneic   Gastrointestinal: soft, NT/ND; +BSx4  Extremities: WWP; no edema, clubbing or cyanosis  Vascular: 2+ radial, DP/PT pulses B/L, L AVF with sutures in place, bandages clean/dry/inact   Neurological: AAOx3; no focal deficits    MEDICATIONS:  MEDICATIONS  (STANDING):  chlorhexidine 2% Cloths 1 Application(s) Topical <User Schedule>  heparin  Infusion 1400 Unit(s)/Hr (14 mL/Hr) IV Continuous <Continuous>    MEDICATIONS  (PRN):  acetaminophen   Tablet .. 650 milliGRAM(s) Oral every 6 hours PRN Temp greater or equal to 38C (100.4F), Mild Pain (1 - 3)  benzonatate 100 milliGRAM(s) Oral three times a day PRN Cough -  1st line  guaiFENesin    Syrup 100 milliGRAM(s) Oral every 6 hours PRN Cough - 2nd line  HYDROmorphone  Injectable 2 milliGRAM(s) IV Push every 4 hours PRN Severe Pain (7 - 10)  HYDROmorphone  Injectable 1 milliGRAM(s) IV Push every 2 hours PRN Moderate Pain (4 - 6)      ALLERGIES:  Allergies    No Known Allergies    Intolerances        LABS:                         10.4   9.28  )-----------( 191      ( 11 Aug 2019 13:27 )             32.2     08-12    136  |  95<L>  |  85<H>  ----------------------------<  82  4.8   |  23  |  12.11<H>    Ca    9.2      12 Aug 2019 05:01  Phos  6.0     08-12  Mg     2.2     08-12      PT/INR - ( 12 Aug 2019 05:02 )   PT: 15.8 sec;   INR: 1.38          PTT - ( 12 Aug 2019 05:02 )  PTT:39.6 sec    CARDIAC MARKERS ( 12 Aug 2019 05:01 )  x     / 0.06 ng/mL / 66 U/L / x     / <1.0 ng/mL            RADIOLOGY, EKG & ADDITIONAL TESTS: Reviewed. Hospital Course:   49 yo M w/ESRD on HD T, Th, Sat via left sided AV fistula presenting complaining of shortness of breath, fevers, chills at dialysis. Found to have b/l PE with pulmonary infarcts. Patient also with severe sepsis likely 2/2 to HD fistula infection and resulting multifactorial respiratory compromise. Admitted to ICU for further monitoring. He was CTPE positive for B/L PE. Started on heparin drip, PTT 92 @ 6am and 83 @3pm. ECHO w EF 55-60% w nL RV and LV fxn. Met criteria for severe sepsis. BCx, UCx sent w suspicion for AV fistula infection given recent instrumentation to site on 8/6. HD done and started on Vanc. Vanc trough @ 5pm 12.9. Dosed Vanc 1250 x 1 after HD. Placed on bipap and started on 1mg q4h prn dilaudid to help pain w breathing.  Overnight, pt's heparin IV on R hand fell off, unclear duration and his 9pm was PTT 48.3; midnight PTT 57.1. His heparin drip increased to 17cc/hr. And was given tessalon pearls for cough. Later in the day his tachypnea worsened and was attempted to place BiPAP however refused BiPAP. He remained on NC and was given dilaudid for pain and RR improved. On 8/10 he remained on nasal cannula and his work of breathing was able to be controlled with dilaudid. His was PTT >200 at noon, so heparin was held and restarted at 1400. His PM PTT was 64 so he was then restarted on his heparin drip. No vanc was given over the weekend because HD was deferred by renal.  He was transitioned to eliquis per pharmacy and nephro recs. On 8/11 pt tolerated oxygen desat test and was deemed stable for step down.     OVERNIGHT EVENTS: Complained of chest pain overnight. EKG and Trp ordered.    SUBJECTIVE / INTERVAL HPI: Mr. Soares states he has pain when taking a deep breath, but otherwise has no complaints and his chest pain has improved. Denies fever/chills, Nausea/vomiting.     ICU Vital Signs Last 24 Hrs  T(C): 37.4 (12 Aug 2019 05:00), Max: 38.4 (11 Aug 2019 09:21)  T(F): 99.4 (12 Aug 2019 05:00), Max: 101.2 (11 Aug 2019 09:21)  HR: 96 (12 Aug 2019 04:40) (82 - 98)  BP: 125/66 (12 Aug 2019 04:40) (108/60 - 140/76)  BP(mean): 89 (12 Aug 2019 04:40) (77 - 101)  RR: 24 (12 Aug 2019 00:25) (14 - 40)  SpO2: 94% (12 Aug 2019 04:40) (92% - 99%)      PHYSICAL EXAM:  General: diaphoretic, saturating well on 2 L NC  HEENT: NC/AT; PERRL, anicteric sclera  Neck: supple  Cardiovascular: +S1/S2, RRR, friction rub appreciated at left sternal border  Respiratory: bibasilar rhonchi, tachypneic   Gastrointestinal: soft, NT/ND; +BSx4  Extremities: WWP; no edema, clubbing or cyanosis  Vascular: 2+ radial, DP/PT pulses B/L, L AVF with sutures in place, bandages clean/dry/inact   Neurological: AAOx3; no focal deficits    MEDICATIONS:  MEDICATIONS  (STANDING):  chlorhexidine 2% Cloths 1 Application(s) Topical <User Schedule>  heparin  Infusion 1400 Unit(s)/Hr (14 mL/Hr) IV Continuous <Continuous>    MEDICATIONS  (PRN):  acetaminophen   Tablet .. 650 milliGRAM(s) Oral every 6 hours PRN Temp greater or equal to 38C (100.4F), Mild Pain (1 - 3)  benzonatate 100 milliGRAM(s) Oral three times a day PRN Cough -  1st line  guaiFENesin    Syrup 100 milliGRAM(s) Oral every 6 hours PRN Cough - 2nd line  HYDROmorphone  Injectable 2 milliGRAM(s) IV Push every 4 hours PRN Severe Pain (7 - 10)  HYDROmorphone  Injectable 1 milliGRAM(s) IV Push every 2 hours PRN Moderate Pain (4 - 6)      ALLERGIES:  Allergies    No Known Allergies    Intolerances        LABS:                         10.4   9.28  )-----------( 191      ( 11 Aug 2019 13:27 )             32.2     08-12    136  |  95<L>  |  85<H>  ----------------------------<  82  4.8   |  23  |  12.11<H>    Ca    9.2      12 Aug 2019 05:01  Phos  6.0     08-12  Mg     2.2     08-12      PT/INR - ( 12 Aug 2019 05:02 )   PT: 15.8 sec;   INR: 1.38          PTT - ( 12 Aug 2019 05:02 )  PTT:39.6 sec    CARDIAC MARKERS ( 12 Aug 2019 05:01 )  x     / 0.06 ng/mL / 66 U/L / x     / <1.0 ng/mL            RADIOLOGY, EKG & ADDITIONAL TESTS: Reviewed.

## 2019-08-12 NOTE — PROGRESS NOTE ADULT - ASSESSMENT
47 yo M w/ESRD on HD T, Th, Sat via left sided AV fistula presenting complaining of shortness of breath, fevers, chills at dialysis. Found to have b/l PE with pulmonary infarcts and right heart strain. Patient also with severe sepsis likely 2/2 to HD fistula infection and resulting multifactorial respiratory compromise. Transitioned to NC 2L satting well, pleuritic pain controlled with dilaudid, HD stable, transferred to 7L for further management.

## 2019-08-12 NOTE — PROGRESS NOTE ADULT - PROBLEM SELECTOR PLAN 5
1) PCP Contacted on Admission: (Y/N) --> Name & Phone #: pt with no PCP  2) Date of Contact with PCP:  3) PCP Contacted at Discharge: (Y/N, N/A)  4) Summary of Handoff Given to PCP:   5) Post-Discharge Appointment Date and Location: F: None  E: replete cautiously in the setting of ESRD  N: DASH/TLC renal diet   GI PPX: None  VTE PPX: Therapeutic AC in the setting of PE as above (Heparin gtt, Coumadin bridge)  FULL CODE   DISPO: [email or web address]

## 2019-08-12 NOTE — PROGRESS NOTE ADULT - PROBLEM SELECTOR PLAN 4
F: None  E: replete cautiously in the setting of ESRD  N: DASH/TLC renal diet   GI PPX: None  VTE PPX: Therapeutic AC in the setting of PE as above (Heparin gtt, Coumadin bridge)  FULL CODE   DISPO: [email or web address] -Patient had chest pain overnight 8/12  -EKG and Trp ordered  -EKG likely indicative of Uremic Pericarditis per Cardiology, Trp 0.06. Patient has baseline Trp of 0.09, F/u trend  -Consider adding renally-dosed Colchicine.  -TTE 8/12 follow-up results with cardiology -Patient had chest pain overnight 8/12  -EKG and Trp ordered  -EKG likely indicative of Uremic Pericarditis per Cardiology, Trp 0.06. Patient has baseline Trp of 0.09, F/u trend  -TTE 8/12 follow-up results with cardiology  -Poor evidence that use of NSAID or colchicine in ESRD would be efficacious, although intensified HD has been shown to improve symptoms  -Repeat EKG 8/13

## 2019-08-12 NOTE — PROGRESS NOTE ADULT - PROBLEM SELECTOR PLAN 3
Meets SIRS criteria by HR, RR and Temp. Patient with no localizing symptoms such as cough, dysuria, rhinorrhea, headache. Since fever onset during dialysis suspect infection of fistula s/p recent instrumentation. s/p Vanc/Zosyn x1. last temp 101.2 (axillary 08/11/19).  - BCx NGTD x2 (08/09/2019)  - hold abx at this time   - vascular following for recommendations regarding fistula, f/u rec Meets SIRS criteria by HR, RR and Temp. Patient with no localizing symptoms such as cough, dysuria, rhinorrhea, headache. Since fever onset during dialysis suspect infection of fistula s/p recent instrumentation. s/p Vanc/Zosyn x1. last temp 101.2 (axillary 08/11/19).  - BCx NGTD x2 (08/09/2019)  - hold abx at this time   - vascular following for recommendations regarding fistula, f/u rec  -Likely febrile due to thrombosis.

## 2019-08-12 NOTE — PROGRESS NOTE ADULT - PROBLEM SELECTOR PLAN 1
CTPE with e/o bilateral occlusive segmental and subsegmental PE with developing bilateral pulmonary infarct, likely 2/2 LUE AV fistula in setting of known thrombectomy. Echo without evidence of R heart strain. Echo with EF 55-60%, normal RV and LV function, repeated by cardio fellow today. Received Eliquis this AM, however will need to be bridged with coumadin.  - restart heparin gtt tonight at 21:00 and dose for Coumadin 2.5 mg   - f/u PT/PTT/INR   - vascular following- no plan for intervention at this time CTPE with e/o bilateral occlusive segmental and subsegmental PE with developing bilateral pulmonary infarct, likely 2/2 LUE AV fistula in setting of known thrombectomy. Echo without evidence of R heart strain. Echo with EF 55-60%, normal RV and LV function, repeated by cardio fellow today. Received Eliquis this AM, however will need to be bridged with coumadin.  -Heparin gtt at 18 ml/hr. Continue to monitor PTT and INR  - Received 2.5 mg of Coumadin on 8/11 evening  -INR 1.44 on 8/12 AM  - vascular following- no plan for intervention at this time CTPE with e/o bilateral occlusive segmental and subsegmental PE with developing bilateral pulmonary infarct, likely 2/2 LUE AV fistula in setting of known thrombectomy. Echo without evidence of R heart strain. Echo with EF 55-60%, normal RV and LV function, repeated by cardio fellow today. Received Eliquis this AM, however will need to be bridged with coumadin.  -Heparin gtt at 18 ml/hr. Continue to monitor PTT and INR  - Received 2.5 mg of Coumadin on 8/11 evening  -INR 1.38 on 8/12 AM  - vascular following- no plan for intervention at this time CTPE with e/o bilateral occlusive segmental and subsegmental PE with developing bilateral pulmonary infarct, likely 2/2 LUE AV fistula in setting of known thrombectomy. Echo without evidence of R heart strain. Echo with EF 55-60%, normal RV and LV function, repeated by cardio fellow today. Received Eliquis this AM, initially bridged with coumadin on 8/11  -Heparin gtt at 18 ml/hr. Continue to monitor PTT and INR  - Received 2.5 mg of Coumadin on 8/11 evening  -INR 1.38 on 8/12 AM  -Consider DC of coumadin for renally-dosed Eliquis  - vascular following- no plan for intervention at this time

## 2019-08-12 NOTE — CONSULT NOTE ADULT - ATTENDING COMMENTS
submassive PE  fever  lactate nl  treat with iv heparin  given degree of RV dilatation on TTE will admit to MICU, vascular to eval for CDT  treat empirically with vanco zosyn for possible sepsis. he had recent repair of clotted AVG
acute respiratory failure with PE  will try some UF to see if aids pulm status as venous congestion noted on imaging
see above edits

## 2019-08-12 NOTE — PROGRESS NOTE ADULT - SUBJECTIVE AND OBJECTIVE BOX
Patient was seen and evaluated on dialysis.   HR: 82 (08-12-19 @ 12:15)  BP: 125/65 (08-12-19 @ 12:15)  Wt(kg): -- 77.8      08-12    134<L>  |  94<L>  |  89<H>  ----------------------------<  90  4.7   |  22  |  12.85<H>    Ca    8.9      12 Aug 2019 11:04  Phos  5.9     08-12  Mg     2.2     08-12    TPro  7.9  /  Alb  3.1<L>  /  TBili  0.3  /  DBili  x   /  AST  16  /  ALT  9<L>  /  AlkPhos  58  08-12    Continue dialysis:   Dialyzer:  revaclear 300    QB: 400  K bath: 2  Goal UF:   2.0    L   over     210          min  Patient is tolerating the procedure well.   Continue full treatment as prescribed.  Will follow URR for dialysis adequacy.

## 2019-08-12 NOTE — PROGRESS NOTE ADULT - ASSESSMENT
47 yo M w PMHx of ESRD on HD T, Th, Sat via left sided AV fistula presenting complaining of shortness of breath, fevers, chills at dialysis. Found to have b/l PE with pulmonary infarcts and right heart strain.   Nephrology consult is placed in regards to ESRD on HD.    # ESRD on HD TTS via LUE AVF   - last HD completed on 8/9 with UF 2.0 L as prescribed  - labs noted, BUN/Cr 85/12.1 <- 65/10.2  - possible uremic pericarditis vs pleural effusion vs pneumonia  - volume status noted  -   next hd anticipated on 8/12  renally dose abx    # HTN  - UF with HD      # Anemia  H/H 10.4/32  continue monitoring 47 yo M w PMHx of ESRD on HD T, Th, Sat via left sided AV fistula presenting complaining of shortness of breath, fevers, chills at dialysis. Found to have b/l PE with pulmonary infarcts and right heart strain.   Nephrology consult is placed in regards to ESRD on HD.    # ESRD on HD TTS via LUE AVF   - last HD completed on 8/9 with UF 2.0 L as prescribed  - possible uremic pericarditis vs pleural effusion vs pneumonia  - volume status and electrolytes noted  - anticipated HD today for clearance and UF   - will check URR (urea reduction ratio) for dialysis adequacy (labs noted BUN/Cr 85/12.1 <- 65/10.2, in setting of possible uremic pericarditis)  - renal dosing of antibiotics    # Anemia  - Hb 9.1 g/dl  - monitor H/H, transfuse as needed

## 2019-08-12 NOTE — CONSULT NOTE ADULT - SUBJECTIVE AND OBJECTIVE BOX
Patient is a 48y old  Male who presents with a chief complaint of B/L PE (11 Aug 2019 10:11)    47 y/o M PMHx of ESRD presented with SOB, fevers na chills at dialysis. Admitted for AVF thrombus removal under vascular. On 8/8 became acute SOB with fevers and chills. CT showed BL PE with right heart strain. ECHO at that time showed interventricular septal bounce, RV dysfunction and hypokinetic RV apex. He was admitted to MICU for further management of PE. He has been having intermittent pleuritic chest pain. However today, complained of severe chest pain 8/10 more intense than his prior pain. It's positional, better leaning forward. His last dialysis was day before yesterday. K 5 this AM.     His EKG at 4.30 revealed diffuse ST elevations 1-3 mm in anterior leads without any reciprocal ST depressions. He also had distinct TX depressions most notable on leads V1-V3, II. His BUN at that time was 85, Kr 4.8. A repeat ECHO at that time did not show any wall motion abnormalities. EF was 65%.     PAST MEDICAL & SURGICAL HISTORY:  ESRD (end stage renal disease) on dialysis  AV fistula    FAMILY HISTORY:  No pertinent family history in first degree relatives      Allergies    No Known Allergies    Intolerances        MEDICATIONS  (STANDING):  chlorhexidine 2% Cloths 1 Application(s) Topical <User Schedule>  heparin  Infusion 1400 Unit(s)/Hr (14 mL/Hr) IV Continuous <Continuous>    MEDICATIONS  (PRN):  acetaminophen   Tablet .. 650 milliGRAM(s) Oral every 6 hours PRN Temp greater or equal to 38C (100.4F), Mild Pain (1 - 3)  benzonatate 100 milliGRAM(s) Oral three times a day PRN Cough -  1st line  guaiFENesin    Syrup 100 milliGRAM(s) Oral every 6 hours PRN Cough - 2nd line  HYDROmorphone  Injectable 2 milliGRAM(s) IV Push every 4 hours PRN Severe Pain (7 - 10)  HYDROmorphone  Injectable 1 milliGRAM(s) IV Push every 2 hours PRN Moderate Pain (4 - 6)      REVIEW OF SYSTEMS:  12 point ROS negative except for that stated in the HPI    PHYSICAL EXAM:  Vitals past 24 Hours: T(C): 37.4 (08-12-19 @ 05:00), Max: 38.4 (08-11-19 @ 09:21)  HR: 96 (08-12-19 @ 04:40) (82 - 98)  BP: 125/66 (08-12-19 @ 04:40) (108/60 - 140/76)  RR: 24 (08-12-19 @ 00:25) (14 - 40)  SpO2: 94% (08-12-19 @ 04:40) (92% - 99%)	    Daily     Daily     GEN: Alert and awake, no acute distress, uremic frost on exam   HEENT: Moist mucous membranes  Neck: No JVD  Cardiovascular: Regular rate and rhythm, +S1 S2. No murmurs, rubs, or gallops appreciated  Respiratory: Lungs clear to auscultation bilaterally  Gastrointestinal:  Soft, non-tender, non-distended, normoactive bowel sounds  Skin: No rashes, No ecchymoses, No cyanosis  Neurologic: Non-focal, alert and oriented x3.   Extremities: No clubbing, cyanosis or edema. Warm, well-perfused extremities.   Vascular: Radial and dorsalis pedis pulses 2+ bilaterally    I&O's Detail    10 Aug 2019 07:01  -  11 Aug 2019 07:00  --------------------------------------------------------  IN:    heparin  Infusion.: 85 mL    heparin Infusion: 98 mL  Total IN: 183 mL    OUT:    Voided: 1150 mL  Total OUT: 1150 mL    Total NET: -967 mL      11 Aug 2019 07:01  -  12 Aug 2019 05:33  --------------------------------------------------------  IN:  Total IN: 0 mL    OUT:    Voided: 550 mL  Total OUT: 550 mL    Total NET: -550 mL            LABS:                        10.4   9.28  )-----------( 191      ( 11 Aug 2019 13:27 )             32.2     08-11    134<L>  |  95<L>  |  65<H>  ----------------------------<  76  5.0   |  23  |  10.21<H>    Ca    8.9      11 Aug 2019 05:53  Phos  6.1     08-11  Mg     2.1     08-11          PT/INR - ( 11 Aug 2019 18:30 )   PT: 16.5 sec;   INR: 1.44          PTT - ( 12 Aug 2019 05:02 )  PTT:39.6 sec    I&O's Summary    10 Aug 2019 07:01  -  11 Aug 2019 07:00  --------------------------------------------------------  IN: 183 mL / OUT: 1150 mL / NET: -967 mL    11 Aug 2019 07:01  -  12 Aug 2019 05:33  --------------------------------------------------------  IN: 0 mL / OUT: 550 mL / NET: -550 mL      CARDIAC DIAGNOSTIC TESTING:    ECG: TOR V1-3 with concaved elevations, no ST depressions. Peaked T waves in anterior leads. TX depressions in anterior pericordial leads and II.     ECHO: Normal EF, no WMA. Did not appreciate RHS seen on previous ECHO.     ASSESSMENT/PLAN:  47 y/o M PMHx of ESRD presented with SOB, fevers na chills at dialysis. Admitted for AVF thrombus removal under vascular incidentally found to have a PE. Now complaining of chest pain likely 2/2 to uremic pericarditis. ECHO unremarkable for any WMA. BUN 85 today with characteristic EKG changes.     Uremic Pericarditis   - EKG with TX depressions and somewhat diffuse ST elevations   - Recommend urgent dialysis   - touch base with renal if colchicine can be given in setting of ESRD   - denied any history of CAD, would consider high dose ASA   - no effusion on ECHO (repeat official echo in AM)   - trend cardiac enzymes till peak     Will be seen by consult team in the AM     Esthela Cali MD   Cardiology fellow on call Patient is a 48y old  Male who presents with a chief complaint of B/L PE (11 Aug 2019 10:11)    49 y/o M PMHx of ESRD presented with SOB, fevers na chills at dialysis. Admitted for AVF thrombus removal under vascular. On 8/8 became acute SOB with fevers and chills. CT showed BL PE with right heart strain. ECHO at that time showed interventricular septal bounce, RV dysfunction and hypokinetic RV apex. He was admitted to MICU for further management of PE. He has been having intermittent pleuritic chest pain. However today, complained of severe chest pain 8/10 more intense than his prior pain. It's positional, better leaning forward. His last dialysis was day before yesterday. K 5 this AM.     His EKG at 4.30 revealed diffuse ST elevations 1-3 mm in anterior leads without any reciprocal ST depressions. He also had distinct OH depressions most notable on leads V1-V3, II. His BUN at that time was 85, Kr 4.8. A repeat ECHO at that time did not show any wall motion abnormalities. EF was 65%.     PAST MEDICAL & SURGICAL HISTORY:  ESRD (end stage renal disease) on dialysis  AV fistula    FAMILY HISTORY:  No pertinent family history in first degree relatives      Allergies    No Known Allergies    Intolerances        MEDICATIONS  (STANDING):  chlorhexidine 2% Cloths 1 Application(s) Topical <User Schedule>  heparin  Infusion 1400 Unit(s)/Hr (14 mL/Hr) IV Continuous <Continuous>    MEDICATIONS  (PRN):  acetaminophen   Tablet .. 650 milliGRAM(s) Oral every 6 hours PRN Temp greater or equal to 38C (100.4F), Mild Pain (1 - 3)  benzonatate 100 milliGRAM(s) Oral three times a day PRN Cough -  1st line  guaiFENesin    Syrup 100 milliGRAM(s) Oral every 6 hours PRN Cough - 2nd line  HYDROmorphone  Injectable 2 milliGRAM(s) IV Push every 4 hours PRN Severe Pain (7 - 10)  HYDROmorphone  Injectable 1 milliGRAM(s) IV Push every 2 hours PRN Moderate Pain (4 - 6)      REVIEW OF SYSTEMS:  12 point ROS negative except for that stated in the HPI    PHYSICAL EXAM:  Vitals past 24 Hours: T(C): 37.4 (08-12-19 @ 05:00), Max: 38.4 (08-11-19 @ 09:21)  HR: 96 (08-12-19 @ 04:40) (82 - 98)  BP: 125/66 (08-12-19 @ 04:40) (108/60 - 140/76)  RR: 24 (08-12-19 @ 00:25) (14 - 40)  SpO2: 94% (08-12-19 @ 04:40) (92% - 99%)	    Daily     Daily     GEN: Alert and awake, no acute distress, uremic frost on exam   HEENT: Moist mucous membranes  Neck: No JVD  Cardiovascular: Regular rate and rhythm, +S1 S2. No murmurs, rubs, or gallops appreciated  Respiratory: Lungs clear to auscultation bilaterally  Gastrointestinal:  Soft, non-tender, non-distended, normoactive bowel sounds  Skin: No rashes, No ecchymoses, No cyanosis  Neurologic: Non-focal, alert and oriented x3.   Extremities: No clubbing, cyanosis or edema. Warm, well-perfused extremities.   Vascular: Radial and dorsalis pedis pulses 2+ bilaterally    I&O's Detail    10 Aug 2019 07:01  -  11 Aug 2019 07:00  --------------------------------------------------------  IN:    heparin  Infusion.: 85 mL    heparin Infusion: 98 mL  Total IN: 183 mL    OUT:    Voided: 1150 mL  Total OUT: 1150 mL    Total NET: -967 mL      11 Aug 2019 07:01  -  12 Aug 2019 05:33  --------------------------------------------------------  IN:  Total IN: 0 mL    OUT:    Voided: 550 mL  Total OUT: 550 mL    Total NET: -550 mL            LABS:                        10.4   9.28  )-----------( 191      ( 11 Aug 2019 13:27 )             32.2     08-11    134<L>  |  95<L>  |  65<H>  ----------------------------<  76  5.0   |  23  |  10.21<H>    Ca    8.9      11 Aug 2019 05:53  Phos  6.1     08-11  Mg     2.1     08-11          PT/INR - ( 11 Aug 2019 18:30 )   PT: 16.5 sec;   INR: 1.44          PTT - ( 12 Aug 2019 05:02 )  PTT:39.6 sec    I&O's Summary    10 Aug 2019 07:01  -  11 Aug 2019 07:00  --------------------------------------------------------  IN: 183 mL / OUT: 1150 mL / NET: -967 mL    11 Aug 2019 07:01  -  12 Aug 2019 05:33  --------------------------------------------------------  IN: 0 mL / OUT: 550 mL / NET: -550 mL      CARDIAC DIAGNOSTIC TESTING:    ECG: TOR V1-3 with concaved elevations, no ST depressions. Peaked T waves in anterior leads. OH depressions in anterior pericordial leads and II.     ECHO: Normal EF, no WMA. Did not appreciate RHS seen on previous ECHO.     ASSESSMENT/PLAN:  49 y/o M PMHx of ESRD presented with SOB, fevers na chills at dialysis. Admitted for AVF thrombus removal under vascular incidentally found to have a PE. Now complaining of chest pain likely 2/2 to his pulmonary emboli    Pleurtic Chest pain   -EKG is not consistent with TOR  -This is not an acute coronary syndrome with acute atherothrombotic event  -Pain control as per primary team   -no need for further cardiac tesitng at this time         Esthela Cali MD   Cardiology fellow on call

## 2019-08-12 NOTE — PROGRESS NOTE ADULT - SUBJECTIVE AND OBJECTIVE BOX
Patient seen and evaluated at bedside.   He is c/o chest pain, sob.   Continue on heparin drip for PE.  Nephrology was called this morning for urgent HD in setting of possible uremic pericarditis.  Last HD 8/9 with UF 2.0 L as prescribed.  BUN/Cr 85/12.1 <- 65/10.2.          Meds:    acetaminophen   Tablet .. 650 milliGRAM(s) Oral every 6 hours PRN  benzonatate 100 milliGRAM(s) Oral three times a day PRN  guaiFENesin    Syrup 100 milliGRAM(s) Oral every 6 hours PRN  heparin  Infusion. 1800 Unit(s)/Hr IV Continuous <Continuous>  HYDROmorphone  Injectable 2 milliGRAM(s) IV Push every 4 hours PRN  HYDROmorphone  Injectable 1 milliGRAM(s) IV Push every 2 hours PRN      T(C): 35.9 (08-12-19 @ 10:55), Max: 37.7 (08-11-19 @ 17:07)  HR: 82 (08-12-19 @ 12:15) (81 - 100)  BP: 125/65 (08-12-19 @ 12:15) (108/60 - 138/73)  RR: 20 (08-12-19 @ 12:15) (16 - 26)  SpO2: 96% (08-12-19 @ 12:15) (94% - 99%)    Input/Output      08-11-19 @ 07:01  -  08-12-19 @ 07:00  --------------------------------------------------------  IN: 176 mL / OUT: 550 mL / NET: -374 mL    08-12-19 @ 07:01  -  08-12-19 @ 12:33  --------------------------------------------------------  IN: 72 mL / OUT: 300 mL / NET: -228 mL            ROS:   Gen: +febrile  Cardiovascular: +chest pain  Respiratory: +sob, no cough, no sputum  Gastrointestinal: no nausea, no vomiting, no change in bowel habits  Neurologic: no headache  : no urinary symptoms        PHYSICAL EXAM:  GENERAL: NAD, alert  HEAD:  Atraumatic, Normocephalic,   EYES: Bilateral conjunctiva and sclera normal   Oral cavity: Oral mucosa dry and pink  NECK: Neck supple, No JVD  CHEST/LUNG: decreased air entry, mild pericardial rub   HEART: Regular rate and rhythm  ABDOMEN: Soft, Nontender, BS+nt, No flank tenderness  EXTREMITIES: 1+ pitting LE edema bilateral  Neurology: AAOx3, no focal neurological deficit  SKIN: chronic skin changes  ACCESS: LUE AVF with thrill and bruit           LABS:                                       10.4   9.28  )-----------( 191      ( 11 Aug 2019 13:27 )             32.2       08-12    136  |  95<L>  |  85<H>  ----------------------------<  82  4.8   |  23  |  12.11<H>    Ca    9.2      12 Aug 2019 05:01  Phos  6.0     08-12  Mg     2.2     08-12        PT/INR - ( 12 Aug 2019 05:02 )   PT: 15.8 sec;   INR: 1.38          PTT - ( 12 Aug 2019 11:04 )  PTT:40.2 sec        CARDIAC MARKERS ( 12 Aug 2019 11:04 )  x     / 0.06 ng/mL / 61 U/L / x     / x      CARDIAC MARKERS ( 12 Aug 2019 05:01 )  x     / 0.06 ng/mL / 66 U/L / x     / <1.0 ng/mL          RADIOLOGY & ADDITIONAL STUDIES:    < from: Xray Chest 1 View- PORTABLE-Urgent (08.12.19 @ 10:20) >  EXAM:  XR CHEST PORTABLE URGENT 1V                          PROCEDURE DATE:  08/12/2019          INTERPRETATION:  Clinical history/reason for exam: Fever.    Single frontal view.    Comparison: August 10, 2019    Findings/  impression: Bilateral opacities/pleural effusions and cardiomegaly,   stable. Stable bony structures.    < end of copied text > Patient seen and evaluated at bedside.   He is c/o chest pain, sob.   Continue on heparin drip for PE.  Nephrology was called this morning for urgent HD in setting of possible uremic pericarditis.  Last HD 8/9 with UF 2.0 L as prescribed.  BUN/Cr 85/12.1 <- 65/10.2.          Meds:    acetaminophen   Tablet .. 650 milliGRAM(s) Oral every 6 hours PRN  benzonatate 100 milliGRAM(s) Oral three times a day PRN  guaiFENesin    Syrup 100 milliGRAM(s) Oral every 6 hours PRN  heparin  Infusion. 1800 Unit(s)/Hr IV Continuous <Continuous>  HYDROmorphone  Injectable 2 milliGRAM(s) IV Push every 4 hours PRN  HYDROmorphone  Injectable 1 milliGRAM(s) IV Push every 2 hours PRN      T(C): 35.9 (08-12-19 @ 10:55), Max: 37.7 (08-11-19 @ 17:07)  HR: 82 (08-12-19 @ 12:15) (81 - 100)  BP: 125/65 (08-12-19 @ 12:15) (108/60 - 138/73)  RR: 20 (08-12-19 @ 12:15) (16 - 26)  SpO2: 96% (08-12-19 @ 12:15) (94% - 99%)    Input/Output      08-11-19 @ 07:01  -  08-12-19 @ 07:00  --------------------------------------------------------  IN: 176 mL / OUT: 550 mL / NET: -374 mL    08-12-19 @ 07:01  -  08-12-19 @ 12:33  --------------------------------------------------------  IN: 72 mL / OUT: 300 mL / NET: -228 mL            ROS:   Gen: +febrile  Cardiovascular: +chest pain  Respiratory: +sob, no cough, no sputum  Gastrointestinal: no nausea, no vomiting, no change in bowel habits  Neurologic: no headache  : no urinary symptoms        PHYSICAL EXAM:  GENERAL: NAD, alert  HEAD:  Atraumatic, Normocephalic,   EYES: Bilateral conjunctiva and sclera normal   Oral cavity: Oral mucosa dry and pink  NECK: Neck supple, No JVD  CHEST/LUNG: decreased air entry, ?mild pericardial rub  HEART: Regular rate and rhythm  ABDOMEN: Soft, Nontender, BS+nt, No flank tenderness  EXTREMITIES: 1+ pitting LE edema bilateral  Neurology: AAOx3, no focal neurological deficit  SKIN: chronic skin changes  ACCESS: LUE AVF with thrill and bruit           LABS:                                       10.4   9.28  )-----------( 191      ( 11 Aug 2019 13:27 )             32.2       08-12    136  |  95<L>  |  85<H>  ----------------------------<  82  4.8   |  23  |  12.11<H>    Ca    9.2      12 Aug 2019 05:01  Phos  6.0     08-12  Mg     2.2     08-12        PT/INR - ( 12 Aug 2019 05:02 )   PT: 15.8 sec;   INR: 1.38          PTT - ( 12 Aug 2019 11:04 )  PTT:40.2 sec        CARDIAC MARKERS ( 12 Aug 2019 11:04 )  x     / 0.06 ng/mL / 61 U/L / x     / x      CARDIAC MARKERS ( 12 Aug 2019 05:01 )  x     / 0.06 ng/mL / 66 U/L / x     / <1.0 ng/mL          RADIOLOGY & ADDITIONAL STUDIES:    < from: Xray Chest 1 View- PORTABLE-Urgent (08.12.19 @ 10:20) >  EXAM:  XR CHEST PORTABLE URGENT 1V                          PROCEDURE DATE:  08/12/2019          INTERPRETATION:  Clinical history/reason for exam: Fever.    Single frontal view.    Comparison: August 10, 2019    Findings/  impression: Bilateral opacities/pleural effusions and cardiomegaly,   stable. Stable bony structures.    < end of copied text > Patient seen and evaluated at bedside.   He is c/o chest pain, sob.   Continue on heparin drip for PE.  Nephrology was called this morning for urgent HD in setting of possible uremic pericarditis.  Last HD 8/9 with UF 2.0 L as prescribed.  BUN/Cr 85/12.1 <- 65/10.2.          Meds:    acetaminophen   Tablet .. 650 milliGRAM(s) Oral every 6 hours PRN  benzonatate 100 milliGRAM(s) Oral three times a day PRN  guaiFENesin    Syrup 100 milliGRAM(s) Oral every 6 hours PRN  heparin  Infusion. 1800 Unit(s)/Hr IV Continuous <Continuous>  HYDROmorphone  Injectable 2 milliGRAM(s) IV Push every 4 hours PRN  HYDROmorphone  Injectable 1 milliGRAM(s) IV Push every 2 hours PRN      T(C): 35.9 (08-12-19 @ 10:55), Max: 37.7 (08-11-19 @ 17:07)  HR: 82 (08-12-19 @ 12:15) (81 - 100)  BP: 125/65 (08-12-19 @ 12:15) (108/60 - 138/73)  RR: 20 (08-12-19 @ 12:15) (16 - 26)  SpO2: 96% (08-12-19 @ 12:15) (94% - 99%)    Input/Output      08-11-19 @ 07:01  -  08-12-19 @ 07:00  --------------------------------------------------------  IN: 176 mL / OUT: 550 mL / NET: -374 mL    08-12-19 @ 07:01  -  08-12-19 @ 12:33  --------------------------------------------------------  IN: 72 mL / OUT: 300 mL / NET: -228 mL            ROS:   Gen: +febrile  Cardiovascular: +chest pain  Respiratory: +sob, no cough, no sputum  Gastrointestinal: no nausea, no vomiting, no change in bowel habits  Neurologic: no headache  : no urinary symptoms        PHYSICAL EXAM:  GENERAL: NAD, alert  HEAD:  Atraumatic, Normocephalic,   EYES: Bilateral conjunctiva and sclera normal   Oral cavity: Oral mucosa dry and pink  NECK: Neck supple, No JVD  CHEST/LUNG: decreased air entry, ? pleural rub?   HEART: Regular rate and rhythm pericardial rub not clearly heard   ABDOMEN: Soft, Nontender, BS+nt, No flank tenderness  EXTREMITIES: 1+ pitting LE edema bilateral  Neurology: AAOx3, no focal neurological deficit  SKIN: chronic skin changes  ACCESS: LUE AVF with thrill and bruit           LABS:                                       10.4   9.28  )-----------( 191      ( 11 Aug 2019 13:27 )             32.2       08-12    136  |  95<L>  |  85<H>  ----------------------------<  82  4.8   |  23  |  12.11<H>    Ca    9.2      12 Aug 2019 05:01  Phos  6.0     08-12  Mg     2.2     08-12        PT/INR - ( 12 Aug 2019 05:02 )   PT: 15.8 sec;   INR: 1.38          PTT - ( 12 Aug 2019 11:04 )  PTT:40.2 sec        CARDIAC MARKERS ( 12 Aug 2019 11:04 )  x     / 0.06 ng/mL / 61 U/L / x     / x      CARDIAC MARKERS ( 12 Aug 2019 05:01 )  x     / 0.06 ng/mL / 66 U/L / x     / <1.0 ng/mL          RADIOLOGY & ADDITIONAL STUDIES:    < from: Xray Chest 1 View- PORTABLE-Urgent (08.12.19 @ 10:20) >  EXAM:  XR CHEST PORTABLE URGENT 1V                          PROCEDURE DATE:  08/12/2019          INTERPRETATION:  Clinical history/reason for exam: Fever.    Single frontal view.    Comparison: August 10, 2019    Findings/  impression: Bilateral opacities/pleural effusions and cardiomegaly,   stable. Stable bony structures.    < end of copied text >

## 2019-08-12 NOTE — PROGRESS NOTE ADULT - PROBLEM SELECTOR PLAN 2
Pt with ESRD on HD, last (full session) on 8/09/19. Unclear etiology of ESRD.   - holding home calcium acetate for now, re-start when patient can take PO meds  - no HD today per renal Pt with ESRD on HD, last (full session) on 8/09/19. Unclear etiology of ESRD.   -Continue home calcium acetate  -Renal consulted. Plan for HD today.   -F/u lab values ordered for HD

## 2019-08-13 LAB
ANION GAP SERPL CALC-SCNC: 15 MMOL/L — SIGNIFICANT CHANGE UP (ref 5–17)
APTT BLD: 30.1 SEC — SIGNIFICANT CHANGE UP (ref 27.5–36.3)
BASOPHILS # BLD AUTO: 0.02 K/UL — SIGNIFICANT CHANGE UP (ref 0–0.2)
BASOPHILS NFR BLD AUTO: 0.3 % — SIGNIFICANT CHANGE UP (ref 0–2)
BUN SERPL-MCNC: 54 MG/DL — HIGH (ref 7–23)
CALCIUM SERPL-MCNC: 9 MG/DL — SIGNIFICANT CHANGE UP (ref 8.4–10.5)
CHLORIDE SERPL-SCNC: 96 MMOL/L — SIGNIFICANT CHANGE UP (ref 96–108)
CO2 SERPL-SCNC: 27 MMOL/L — SIGNIFICANT CHANGE UP (ref 22–31)
CREAT SERPL-MCNC: 9.1 MG/DL — HIGH (ref 0.5–1.3)
EOSINOPHIL # BLD AUTO: 0.25 K/UL — SIGNIFICANT CHANGE UP (ref 0–0.5)
EOSINOPHIL NFR BLD AUTO: 3.1 % — SIGNIFICANT CHANGE UP (ref 0–6)
GLUCOSE SERPL-MCNC: 76 MG/DL — SIGNIFICANT CHANGE UP (ref 70–99)
HCT VFR BLD CALC: 28.4 % — LOW (ref 39–50)
HGB BLD-MCNC: 9.3 G/DL — LOW (ref 13–17)
IMM GRANULOCYTES NFR BLD AUTO: 0.9 % — SIGNIFICANT CHANGE UP (ref 0–1.5)
INR BLD: 1.72 — HIGH (ref 0.88–1.16)
LYMPHOCYTES # BLD AUTO: 0.71 K/UL — LOW (ref 1–3.3)
LYMPHOCYTES # BLD AUTO: 8.9 % — LOW (ref 13–44)
MAGNESIUM SERPL-MCNC: 2.1 MG/DL — SIGNIFICANT CHANGE UP (ref 1.6–2.6)
MCHC RBC-ENTMCNC: 28.5 PG — SIGNIFICANT CHANGE UP (ref 27–34)
MCHC RBC-ENTMCNC: 32.7 GM/DL — SIGNIFICANT CHANGE UP (ref 32–36)
MCV RBC AUTO: 87.1 FL — SIGNIFICANT CHANGE UP (ref 80–100)
MONOCYTES # BLD AUTO: 1.21 K/UL — HIGH (ref 0–0.9)
MONOCYTES NFR BLD AUTO: 15.2 % — HIGH (ref 2–14)
NEUTROPHILS # BLD AUTO: 5.71 K/UL — SIGNIFICANT CHANGE UP (ref 1.8–7.4)
NEUTROPHILS NFR BLD AUTO: 71.6 % — SIGNIFICANT CHANGE UP (ref 43–77)
NRBC # BLD: 0 /100 WBCS — SIGNIFICANT CHANGE UP (ref 0–0)
PHOSPHATE SERPL-MCNC: 5 MG/DL — HIGH (ref 2.5–4.5)
PLATELET # BLD AUTO: 212 K/UL — SIGNIFICANT CHANGE UP (ref 150–400)
POTASSIUM SERPL-MCNC: 4.2 MMOL/L — SIGNIFICANT CHANGE UP (ref 3.5–5.3)
POTASSIUM SERPL-SCNC: 4.2 MMOL/L — SIGNIFICANT CHANGE UP (ref 3.5–5.3)
PROTHROM AB SERPL-ACNC: 19.8 SEC — HIGH (ref 10–12.9)
RBC # BLD: 3.26 M/UL — LOW (ref 4.2–5.8)
RBC # FLD: 14.3 % — SIGNIFICANT CHANGE UP (ref 10.3–14.5)
SODIUM SERPL-SCNC: 138 MMOL/L — SIGNIFICANT CHANGE UP (ref 135–145)
WBC # BLD: 7.97 K/UL — SIGNIFICANT CHANGE UP (ref 3.8–10.5)
WBC # FLD AUTO: 7.97 K/UL — SIGNIFICANT CHANGE UP (ref 3.8–10.5)

## 2019-08-13 PROCEDURE — 99233 SBSQ HOSP IP/OBS HIGH 50: CPT | Mod: GC

## 2019-08-13 PROCEDURE — 71045 X-RAY EXAM CHEST 1 VIEW: CPT | Mod: 26

## 2019-08-13 PROCEDURE — 99232 SBSQ HOSP IP/OBS MODERATE 35: CPT | Mod: GC

## 2019-08-13 RX ADMIN — APIXABAN 5 MILLIGRAM(S): 2.5 TABLET, FILM COATED ORAL at 21:50

## 2019-08-13 RX ADMIN — PANTOPRAZOLE SODIUM 40 MILLIGRAM(S): 20 TABLET, DELAYED RELEASE ORAL at 06:43

## 2019-08-13 RX ADMIN — APIXABAN 5 MILLIGRAM(S): 2.5 TABLET, FILM COATED ORAL at 10:08

## 2019-08-13 RX ADMIN — Medication 100 MILLIGRAM(S): at 23:36

## 2019-08-13 NOTE — PROGRESS NOTE ADULT - SUBJECTIVE AND OBJECTIVE BOX
Hospital Course:   Mr. Soares 47 yo M w/ESRD on HD T, Th, Sat via left sided AV fistula presenting complaining of shortness of breath, fevers, chills at dialysis. Found to have b/l PE with pulmonary infarcts. Patient also with severe sepsis likely 2/2 to HD fistula infection and resulting multifactorial respiratory compromise. Admitted to ICU for further monitoring. He was CTPE positive for B/L PE. Started on heparin drip, PTT 92 @ 6am and 83 @3pm. ECHO w EF 55-60% w nL RV and LV fxn. Met criteria for severe sepsis. BCx, UCx sent w suspicion for AV fistula infection given recent instrumentation to site on 8/6. HD done and started on Vanc. Vanc trough @ 5pm 12.9. Dosed Vanc 1250 x 1 after HD. Placed on bipap and started on 1mg q4h prn dilaudid to help pain w breathing.  Overnight, pt's heparin IV on R hand fell off, unclear duration and his 9pm was PTT 48.3; midnight PTT 57.1. His heparin drip increased to 17cc/hr. And was given tessalon pearls for cough. Later in the day his tachypnea worsened and was attempted to place BiPAP however refused BiPAP. He remained on NC and was given dilaudid for pain and RR improved. On 8/10 he remained on nasal cannula and his work of breathing was able to be controlled with dilaudid. His was PTT >200 at noon, so heparin was held and restarted at 1400. His PM PTT was 64 so he was then restarted on his heparin drip. No vanc was given over the weekend because HD was deferred by renal.  He was transitioned to eliquis per pharmacy and nephro recs. On 8/11 pt tolerated oxygen desat test and was deemed stable for step down. On 8/12, patient received HD. On 8/13, heparin drip was discontinued and he was started on renally-dosed Eliquis at 5 mg BID.     OVERNIGHT EVENTS:     SUBJECTIVE / INTERVAL HPI: Mr. Soares states he has pain when taking a deep breath, but otherwise has no complaints and his chest pain has improved. Denies fever/chills, Nausea/vomiting.     ICU Vital Signs Last 24 Hrs  T(C): 37.6 (13 Aug 2019 05:00), Max: 37.9 (12 Aug 2019 16:21)  T(F): 99.6 (13 Aug 2019 05:00), Max: 100.3 (12 Aug 2019 16:21)  HR: 86 (13 Aug 2019 04:36) (81 - 100)  BP: 104/54 (13 Aug 2019 04:36) (102/56 - 136/71)  BP(mean): 74 (13 Aug 2019 04:36) (72 - 96)  RR: 18 (13 Aug 2019 04:36) (16 - 20)  SpO2: 100% (13 Aug 2019 04:36) (91% - 100%)    PHYSICAL EXAM:  General: diaphoretic, saturating well on 2 L NC  HEENT: NC/AT; PERRL, anicteric sclera  Neck: supple  Cardiovascular: +S1/S2, RRR, friction rub appreciated at left sternal border  Respiratory: bibasilar rhonchi, tachypneic   Gastrointestinal: soft, NT/ND; +BSx4  Extremities: WWP; no edema, clubbing or cyanosis  Vascular: 2+ radial, DP/PT pulses B/L, L AVF with sutures in place, bandages clean/dry/inact   Neurological: AAOx3; no focal deficits    MEDICATIONS:  MEDICATIONS  (STANDING):  chlorhexidine 2% Cloths 1 Application(s) Topical <User Schedule>  heparin  Infusion 1400 Unit(s)/Hr (14 mL/Hr) IV Continuous <Continuous>    MEDICATIONS  (PRN):  acetaminophen   Tablet .. 650 milliGRAM(s) Oral every 6 hours PRN Temp greater or equal to 38C (100.4F), Mild Pain (1 - 3)  benzonatate 100 milliGRAM(s) Oral three times a day PRN Cough -  1st line  guaiFENesin    Syrup 100 milliGRAM(s) Oral every 6 hours PRN Cough - 2nd line  HYDROmorphone  Injectable 2 milliGRAM(s) IV Push every 4 hours PRN Severe Pain (7 - 10)  HYDROmorphone  Injectable 1 milliGRAM(s) IV Push every 2 hours PRN Moderate Pain (4 - 6)      ALLERGIES:  Allergies    No Known Allergies    Intolerances        LABS:                         10.4   9.28  )-----------( 191      ( 11 Aug 2019 13:27 )             32.2     08-12    136  |  95<L>  |  85<H>  ----------------------------<  82  4.8   |  23  |  12.11<H>    Ca    9.2      12 Aug 2019 05:01  Phos  6.0     08-12  Mg     2.2     08-12      PT/INR - ( 12 Aug 2019 05:02 )   PT: 15.8 sec;   INR: 1.38          PTT - ( 12 Aug 2019 05:02 )  PTT:39.6 sec    CARDIAC MARKERS ( 12 Aug 2019 05:01 )  x     / 0.06 ng/mL / 66 U/L / x     / <1.0 ng/mL            RADIOLOGY, EKG & ADDITIONAL TESTS: Reviewed. Hospital Course:   Mr. Soares 49 yo M w/ESRD on HD T, Th, Sat via left sided AV fistula presenting complaining of shortness of breath, fevers, chills at dialysis. Found to have b/l PE with pulmonary infarcts. Patient also with severe sepsis likely 2/2 to HD fistula infection and resulting multifactorial respiratory compromise. Admitted to ICU for further monitoring. He was CTPE positive for B/L PE. Started on heparin drip, PTT 92 @ 6am and 83 @3pm. ECHO w EF 55-60% w nL RV and LV fxn. Met criteria for severe sepsis. BCx, UCx sent w suspicion for AV fistula infection given recent instrumentation to site on 8/6. HD done and started on Vanc. Vanc trough @ 5pm 12.9. Dosed Vanc 1250 x 1 after HD. Placed on bipap and started on 1mg q4h prn dilaudid to help pain w breathing.  Overnight, pt's heparin IV on R hand fell off, unclear duration and his 9pm was PTT 48.3; midnight PTT 57.1. His heparin drip increased to 17cc/hr. And was given tessalon pearls for cough. Later in the day his tachypnea worsened and was attempted to place BiPAP however refused BiPAP. He remained on NC and was given dilaudid for pain and RR improved. On 8/10 he remained on nasal cannula and his work of breathing was able to be controlled with dilaudid. His was PTT >200 at noon, so heparin was held and restarted at 1400. His PM PTT was 64 so he was then restarted on his heparin drip. No vanc was given over the weekend because HD was deferred by renal.  He was transitioned to eliquis per pharmacy and nephro recs. On 8/11 pt tolerated oxygen desat test and was deemed stable for step down. On 8/12, patient received HD. On 8/13, heparin drip was discontinued and he was started on renally-dosed Eliquis at 5 mg BID.     OVERNIGHT EVENTS:     SUBJECTIVE / INTERVAL HPI: Mr. Soares states he has pain when taking a deep breath, but otherwise has no complaints and his chest pain has improved. Denies fever/chills, Nausea/vomiting.     ICU Vital Signs Last 24 Hrs  T(C): 37.6 (13 Aug 2019 05:00), Max: 37.9 (12 Aug 2019 16:21)  T(F): 99.6 (13 Aug 2019 05:00), Max: 100.3 (12 Aug 2019 16:21)  HR: 86 (13 Aug 2019 04:36) (81 - 100)  BP: 104/54 (13 Aug 2019 04:36) (102/56 - 136/71)  BP(mean): 74 (13 Aug 2019 04:36) (72 - 96)  RR: 18 (13 Aug 2019 04:36) (16 - 20)  SpO2: 100% (13 Aug 2019 04:36) (91% - 100%)    PHYSICAL EXAM:  General: diaphoretic, saturating well on 2 L NC  HEENT: NC/AT; PERRL, anicteric sclera  Neck: supple  Cardiovascular: +S1/S2, RRR, friction rub appreciated at left sternal border  Respiratory: bibasilar rhonchi, tachypneic   Gastrointestinal: soft, NT/ND; +BSx4  Extremities: WWP; no edema, clubbing or cyanosis  Vascular: 2+ radial, DP/PT pulses B/L, L AVF with sutures in place, bandages clean/dry/inact   Neurological: AAOx3; no focal deficits    MEDICATIONS:  MEDICATIONS  (STANDING):  chlorhexidine 2% Cloths 1 Application(s) Topical <User Schedule>  heparin  Infusion 1400 Unit(s)/Hr (14 mL/Hr) IV Continuous <Continuous>    MEDICATIONS  (PRN):  acetaminophen   Tablet .. 650 milliGRAM(s) Oral every 6 hours PRN Temp greater or equal to 38C (100.4F), Mild Pain (1 - 3)  benzonatate 100 milliGRAM(s) Oral three times a day PRN Cough -  1st line  guaiFENesin    Syrup 100 milliGRAM(s) Oral every 6 hours PRN Cough - 2nd line  HYDROmorphone  Injectable 2 milliGRAM(s) IV Push every 4 hours PRN Severe Pain (7 - 10)  HYDROmorphone  Injectable 1 milliGRAM(s) IV Push every 2 hours PRN Moderate Pain (4 - 6)      ALLERGIES:  Allergies    No Known Allergies    Intolerances        LABS:                         9.3    7.97  )-----------( 212      ( 13 Aug 2019 06:15 )             28.4     08-13    138  |  96  |  54<H>  ----------------------------<  76  4.2   |  27  |  9.10<H>    Ca    9.0      13 Aug 2019 06:15  Phos  5.0     08-13  Mg     2.1     08-13    TPro  7.9  /  Alb  3.1<L>  /  TBili  0.3  /  DBili  x   /  AST  16  /  ALT  9<L>  /  AlkPhos  58  08-12    PT/INR - ( 13 Aug 2019 06:15 )   PT: 19.8 sec;   INR: 1.72          PTT - ( 13 Aug 2019 06:15 )  PTT:30.1 sec    CARDIAC MARKERS ( 12 Aug 2019 11:04 )  x     / 0.06 ng/mL / 61 U/L / x     / x      CARDIAC MARKERS ( 12 Aug 2019 05:01 )  x     / 0.06 ng/mL / 66 U/L / x     / <1.0 ng/mL            RADIOLOGY, EKG & ADDITIONAL TESTS: Reviewed.             RADIOLOGY, EKG & ADDITIONAL TESTS: Reviewed. Hospital Course:   Mr. Soares 49 yo M w/ESRD on HD T, Th, Sat via left sided AV fistula presenting complaining of shortness of breath, fevers, chills at dialysis. Found to have b/l PE with pulmonary infarcts. Patient also with severe sepsis likely 2/2 to HD fistula infection and resulting multifactorial respiratory compromise. Admitted to ICU for further monitoring. He was CTPE positive for B/L PE. Started on heparin drip, PTT 92 @ 6am and 83 @3pm. ECHO w EF 55-60% w nL RV and LV fxn. Met criteria for severe sepsis. BCx, UCx sent w suspicion for AV fistula infection given recent instrumentation to site on 8/6. HD done and started on Vanc. Vanc trough @ 5pm 12.9. Dosed Vanc 1250 x 1 after HD. Placed on bipap and started on 1mg q4h prn dilaudid to help pain w breathing.  Overnight, pt's heparin IV on R hand fell off, unclear duration and his 9pm was PTT 48.3; midnight PTT 57.1. His heparin drip increased to 17cc/hr. And was given tessalon pearls for cough. Later in the day his tachypnea worsened and was attempted to place BiPAP however refused BiPAP. He remained on NC and was given dilaudid for pain and RR improved. On 8/10 he remained on nasal cannula and his work of breathing was able to be controlled with dilaudid. His was PTT >200 at noon, so heparin was held and restarted at 1400. His PM PTT was 64 so he was then restarted on his heparin drip. No vanc was given over the weekend because HD was deferred by renal.  He was transitioned to eliquis per pharmacy and nephro recs. On 8/11 pt tolerated oxygen desat test and was deemed stable for step down. On 8/12, patient received HD. On 8/13, heparin drip was discontinued and he was started on renally-dosed Eliquis at 5 mg BID.     OVERNIGHT EVENTS:     SUBJECTIVE / INTERVAL HPI: Mr. Soares states he has pain when taking a deep breath, but otherwise has no complaints and his chest pain has improved. Denies fever/chills, Nausea/vomiting.     ICU Vital Signs Last 24 Hrs  T(C): 37.6 (13 Aug 2019 05:00), Max: 37.9 (12 Aug 2019 16:21)  T(F): 99.6 (13 Aug 2019 05:00), Max: 100.3 (12 Aug 2019 16:21)  HR: 86 (13 Aug 2019 04:36) (81 - 100)  BP: 104/54 (13 Aug 2019 04:36) (102/56 - 136/71)  BP(mean): 74 (13 Aug 2019 04:36) (72 - 96)  RR: 18 (13 Aug 2019 04:36) (16 - 20)  SpO2: 100% (13 Aug 2019 04:36) (91% - 100%)    PHYSICAL EXAM:  General: tachypneic, saturating well on 2 L NC  HEENT: NC/AT; PERRL, anicteric sclera  Neck: supple  Cardiovascular: +S1/S2, RRR, no rubs  Respiratory: bibasilar rhonchi, tachypneic   Gastrointestinal: soft, NT/ND; +BSx4  Extremities: WWP; no edema, clubbing or cyanosis  Vascular: 2+ radial, DP/PT pulses B/L, L AVF with sutures in place, bandages clean/dry/inact   Neurological: AAOx3; no focal deficits    MEDICATIONS:  MEDICATIONS  (STANDING):  chlorhexidine 2% Cloths 1 Application(s) Topical <User Schedule>  heparin  Infusion 1400 Unit(s)/Hr (14 mL/Hr) IV Continuous <Continuous>    MEDICATIONS  (PRN):  acetaminophen   Tablet .. 650 milliGRAM(s) Oral every 6 hours PRN Temp greater or equal to 38C (100.4F), Mild Pain (1 - 3)  benzonatate 100 milliGRAM(s) Oral three times a day PRN Cough -  1st line  guaiFENesin    Syrup 100 milliGRAM(s) Oral every 6 hours PRN Cough - 2nd line  HYDROmorphone  Injectable 2 milliGRAM(s) IV Push every 4 hours PRN Severe Pain (7 - 10)  HYDROmorphone  Injectable 1 milliGRAM(s) IV Push every 2 hours PRN Moderate Pain (4 - 6)      ALLERGIES:  Allergies    No Known Allergies    Intolerances        LABS:                         9.3    7.97  )-----------( 212      ( 13 Aug 2019 06:15 )             28.4     08-13    138  |  96  |  54<H>  ----------------------------<  76  4.2   |  27  |  9.10<H>    Ca    9.0      13 Aug 2019 06:15  Phos  5.0     08-13  Mg     2.1     08-13    TPro  7.9  /  Alb  3.1<L>  /  TBili  0.3  /  DBili  x   /  AST  16  /  ALT  9<L>  /  AlkPhos  58  08-12    PT/INR - ( 13 Aug 2019 06:15 )   PT: 19.8 sec;   INR: 1.72          PTT - ( 13 Aug 2019 06:15 )  PTT:30.1 sec    CARDIAC MARKERS ( 12 Aug 2019 11:04 )  x     / 0.06 ng/mL / 61 U/L / x     / x      CARDIAC MARKERS ( 12 Aug 2019 05:01 )  x     / 0.06 ng/mL / 66 U/L / x     / <1.0 ng/mL            RADIOLOGY, EKG & ADDITIONAL TESTS: Reviewed.             RADIOLOGY, EKG & ADDITIONAL TESTS: Reviewed.

## 2019-08-13 NOTE — PROGRESS NOTE ADULT - PROBLEM SELECTOR PLAN 1
CTPE with e/o bilateral occlusive segmental and subsegmental PE with developing bilateral pulmonary infarct, likely 2/2 LUE AV fistula in setting of known thrombectomy. Echo without evidence of R heart strain. Echo with EF 55-60%, normal RV and LV function, repeated by cardio fellow today. Received Eliquis this AM, initially bridged with coumadin on 8/11  -Heparin gtt at 18 ml/hr. Continue to monitor PTT and INR  - Received 2.5 mg of Coumadin on 8/11 evening  -INR 1.38 on 8/12 AM  -Consider DC of coumadin for renally-dosed Eliquis  - vascular following- no plan for intervention at this time CTPE with e/o bilateral occlusive segmental and subsegmental PE with developing bilateral pulmonary infarct, likely 2/2 LUE AV fistula in setting of known thrombectomy. Echo without evidence of R heart strain. Echo with EF 55-60%, normal RV and LV function, repeated by cardio fellow today. Received Eliquis this AM, initially bridged with coumadin on 8/11  - Received 2.5 mg of Coumadin on 8/11 evening;   -INR 1.38 on 8/12 AM  -Consider DC of coumadin for renally-dosed Eliquis  -Heparin gtt DC patient on renally-dosed Eliquis 5 mg BID. Started on 8/12 PM  - vascular following- no plan for intervention at this time

## 2019-08-13 NOTE — PROGRESS NOTE ADULT - ASSESSMENT
47 yo M w PMHx of ESRD on HD T, Th, Sat via left sided AV fistula presenting complaining of shortness of breath, fevers, chills at dialysis. Found to have b/l PE with pulmonary infarcts and right heart strain.   Nephrology consult is placed in regards to ESRD on HD.    # ESRD on HD TTS via LUE AVF   - last HD completed on 8/12 with UF 3.0 L as prescribed  - volume status and electrolytes noted, acceptable  - defer HD today  - anticipated HD on 8/14  - renal dosing of antibiotics    # Chest pain  - uremic pericarditis less likely (regular HD, compliance, URR >65%)  - consider pleural effusion vs pneumonia vs PE/ lung infarction  - renal dosing of antibiotics  - cardiology evaluation and recommendations    # Anemia  - Hb 9.3 g/dl, stable  - monitor H/H, transfuse as needed

## 2019-08-13 NOTE — PROGRESS NOTE ADULT - PROBLEM SELECTOR PLAN 3
Meets SIRS criteria by HR, RR and Temp. Patient with no localizing symptoms such as cough, dysuria, rhinorrhea, headache. Since fever onset during dialysis suspect infection of fistula s/p recent instrumentation. s/p Vanc/Zosyn x1. last temp 101.2 (axillary 08/11/19).  - BCx NGTD x2 (08/09/2019)  - hold abx at this time   - vascular following for recommendations regarding fistula, f/u rec  -Likely febrile due to thrombosis.

## 2019-08-13 NOTE — CHART NOTE - NSCHARTNOTEFT_GEN_A_CORE
Admitting Diagnosis:   Patient is a 48y old  Male who presents with a chief complaint of SOB and Fever (13 Aug 2019 06:10)      PAST MEDICAL & SURGICAL HISTORY:  ESRD (end stage renal disease) on dialysis  AV fistula      Current Nutrition Order:  DASH, Renal     PO Intake: Good (%) [   ]  Fair (50-75%) [ X  ] Poor (<25%) [   ]    GI Issues: +Constipation    Pain: Some chest pain, and mild abdominal discomfort endorsed     Skin Integrity: Regino 19, L. AVF    Labs:       138  |  96  |  54<H>  ----------------------------<  76  4.2   |  27  |  9.10<H>    Ca    9.0      13 Aug 2019 06:15  Phos  5.0       Mg     2.1         TPro  7.9  /  Alb  3.1<L>  /  TBili  0.3  /  DBili  x   /  AST  16  /  ALT  9<L>  /  AlkPhos  58      CAPILLARY BLOOD GLUCOSE          Medications:  MEDICATIONS  (STANDING):  apixaban 5 milliGRAM(s) Oral every 12 hours  pantoprazole    Tablet 40 milliGRAM(s) Oral before breakfast    MEDICATIONS  (PRN):  acetaminophen   Tablet .. 650 milliGRAM(s) Oral every 6 hours PRN Temp greater or equal to 38C (100.4F), Mild Pain (1 - 3)  benzonatate 100 milliGRAM(s) Oral three times a day PRN Cough -  1st line  guaiFENesin    Syrup 100 milliGRAM(s) Oral every 6 hours PRN Cough - 2nd line  HYDROmorphone  Injectable 2 milliGRAM(s) IV Push every 4 hours PRN Severe Pain (7 - 10)  HYDROmorphone  Injectable 1 milliGRAM(s) IV Push every 2 hours PRN Moderate Pain (4 - 6)      Weight:  Daily     Daily Weight in k.8 (12 Aug 2019 14:25), dry     Weight Change: 17.2kg weight loss from admission; likely admission wt was inaccurate, though also w/fluid loss from HD    Nutrition Focused Physical Exam: Completed [   ]  Not Pertinent [ X  ]    Estimated energy needs: Most recent dry BW of 74.8kg used to estimate needs as pt w/in % of IBW. Needs increased for HD. Fluids 1000mL +UOP or per team  Calories: 30-35 kcal/kg= 5859-0569 kcal/day  Protein: 1.2-1.4 g/kg = 90-105g protein/day    Subjective: 47 yo/male with PMHx ESRD on HD, s/p declotting of LUE AVF on , readmitted w/SOB and CP. CT showing B/L segmental PE w/borderline R. heart strain and B/L LL opacities; likely AVF source of clot. No intervention by vascular at this time. Pt stepped down to [facility]. Last HD . Seen in room, asleep but awakened to name. He endorses okay appetite; ate ~50% of breakfast, though not very hungry for lunch 2/2 abdominal discomfort. Has not had a BM in a few days. Still endorses mild chest pain w/deep inspiration- team aware. Pt endorses following w/RD at outpatient HD center. Renal diet ed handout provided, though pt sleepy at this time and did not want to review. Will continue to follow per RD protocol.     Previous Nutrition Diagnosis:  Increased Nutrient Needs RT increased demand for protein-calorie intake AEB ESRD on HD.     Active [ X  ]  Resolved [   ]    If resolved, new PES:     Goal: Pt will consistently meet % of EER     Recommendations:  1. Encourage PO intake; if intake remains <50%, consider addition of Nepro 1x/day (425kcal, 19g pro)  2. Monitor lytes and replete prn.   3. Trend dry wts post HD   4. Reinforce diet education     Education: Renal diet ed handout provided; encouraged PO intake     Risk Level: High [   ] Moderate [ X  ] Low [   ] Admitting Diagnosis:   Patient is a 48y old  Male who presents with a chief complaint of SOB and Fever (13 Aug 2019 06:10)      PAST MEDICAL & SURGICAL HISTORY:  ESRD (end stage renal disease) on dialysis  AV fistula      Current Nutrition Order:  DASH, Renal     PO Intake: Good (%) [   ]  Fair (50-75%) [ X  ] Poor (<25%) [   ]    GI Issues: +Constipation    Pain: Some chest pain, and mild abdominal discomfort endorsed     Skin Integrity: Regino 19, L. AVF    Labs:       138  |  96  |  54<H>  ----------------------------<  76  4.2   |  27  |  9.10<H>    Ca    9.0      13 Aug 2019 06:15  Phos  5.0       Mg     2.1         TPro  7.9  /  Alb  3.1<L>  /  TBili  0.3  /  DBili  x   /  AST  16  /  ALT  9<L>  /  AlkPhos  58      CAPILLARY BLOOD GLUCOSE          Medications:  MEDICATIONS  (STANDING):  apixaban 5 milliGRAM(s) Oral every 12 hours  pantoprazole    Tablet 40 milliGRAM(s) Oral before breakfast    MEDICATIONS  (PRN):  acetaminophen   Tablet .. 650 milliGRAM(s) Oral every 6 hours PRN Temp greater or equal to 38C (100.4F), Mild Pain (1 - 3)  benzonatate 100 milliGRAM(s) Oral three times a day PRN Cough -  1st line  guaiFENesin    Syrup 100 milliGRAM(s) Oral every 6 hours PRN Cough - 2nd line  HYDROmorphone  Injectable 2 milliGRAM(s) IV Push every 4 hours PRN Severe Pain (7 - 10)  HYDROmorphone  Injectable 1 milliGRAM(s) IV Push every 2 hours PRN Moderate Pain (4 - 6)      Weight:  Daily     Daily Weight in k.8 (12 Aug 2019 14:25), dry     Weight Change: 17.2kg weight loss from admission; likely admission wt was inaccurate, though also w/fluid loss from HD    Nutrition Focused Physical Exam: Completed [   ]  Not Pertinent [ X  ]    Estimated energy needs: Most recent dry BW of 74.8kg used to estimate needs as pt w/in % of IBW. Needs increased for HD. Fluids 1000mL +UOP or per team  Calories: 30-35 kcal/kg= 7010-8103 kcal/day  Protein: 1.2-1.4 g/kg = 90-105g protein/day    Subjective: 47 yo/male with PMHx ESRD on HD, s/p declotting of LUE AVF on , readmitted w/SOB and CP. CT showing B/L segmental PE w/borderline R. heart strain and B/L LL opacities; likely AVF source of clot. No intervention by vascular at this time. Pt stepped down to [facility]. Last HD . Seen in room, asleep but awakened to name. He endorses okay appetite; ate ~50% of breakfast, though not very hungry for lunch 2/2 abdominal discomfort. Has not had a BM in a few days. Still endorses mild chest pain w/deep inspiration- team aware. Pt endorses following w/RD at outpatient HD center. Renal diet ed handout provided, though pt sleepy at this time and did not want to review. Will continue to follow per RD protocol.     Previous Nutrition Diagnosis:  Increased Nutrient Needs RT increased demand for protein-calorie intake AEB ESRD on HD.     Active [ X  ]  Resolved [   ]    If resolved, new PES:     Goal: Pt will consistently meet % of EER     Recommendations:  1. Encourage PO intake; if intake remains <50%, consider addition of Nepro 1x/day (425kcal, 19g pro)  2. Monitor lytes and replete prn.   3. Recommend addition of bowel regimen   4. Reinforce diet education     Education: Renal diet ed handout provided; encouraged PO intake     Risk Level: High [   ] Moderate [ X  ] Low [   ]

## 2019-08-13 NOTE — PROGRESS NOTE ADULT - SUBJECTIVE AND OBJECTIVE BOX
Patient seen and evaluated at bedside.   He admits to feeling better today.  No acute events overnight.  URR was done on 8/12 for dialysis adequacy in the setting of possible uremic pericarditis and was >65 %, that is acceptable.   Last HD on 8/12 with UF 3.0 L as prescribed.            Meds:    acetaminophen   Tablet .. 650 milliGRAM(s) Oral every 6 hours PRN  apixaban 5 milliGRAM(s) Oral every 12 hours  benzonatate 100 milliGRAM(s) Oral three times a day PRN  guaiFENesin    Syrup 100 milliGRAM(s) Oral every 6 hours PRN  HYDROmorphone  Injectable 2 milliGRAM(s) IV Push every 4 hours PRN  HYDROmorphone  Injectable 1 milliGRAM(s) IV Push every 2 hours PRN  pantoprazole    Tablet 40 milliGRAM(s) Oral before breakfast      T(C): 37.9 (08-13-19 @ 09:10), Max: 37.9 (08-12-19 @ 16:21)  HR: 84 (08-13-19 @ 12:10) (82 - 100)  BP: 104/54 (08-13-19 @ 12:10) (102/56 - 122/67)  RR: 19 (08-13-19 @ 12:10) (18 - 20)  SpO2: 98% (08-13-19 @ 12:10) (91% - 100%)    Input/Output      08-12-19 @ 07:01  -  08-13-19 @ 07:00  --------------------------------------------------------  IN: 252 mL / OUT: 3400 mL / NET: -3148 mL          ROS:   Gen: + febrile  Cardiovascular: no chest pain  Respiratory: no cough, no sputum  Gastrointestinal: no nausea, no vomiting, no change in bowel habits  Neurologic: no headache  : no urinary symptoms        PHYSICAL EXAM:  GENERAL: NAD, alert  HEAD:  Atraumatic, Normocephalic  EYES: Bilateral conjunctiva and sclera normal   Oral cavity: Oral mucosa dry and pink  NECK: Neck supple, No JVD  CHEST/LUNG: decreased air entry, clear breath sounds bilateral  HEART: Regular rate and rhythm, no murmurs, rubs appreciated  ABDOMEN: Soft, Nontender, BS+nt, No flank tenderness  EXTREMITIES: mild pitting LE edema bilateral  Neurology: AAOx3, no focal neurological deficit  SKIN: chronic skin changes  ACCESS: LUE AVF with good thrill and bruit           LABS:                                       9.3    7.97  )-----------( 212      ( 13 Aug 2019 06:15 )             28.4       08-13    138  |  96  |  54<H>  ----------------------------<  76  4.2   |  27  |  9.10<H>    Ca    9.0      13 Aug 2019 06:15  Phos  5.0     08-13  Mg     2.1     08-13    TPro  7.9  /  Alb  3.1<L>  /  TBili  0.3  /  DBili  x   /  AST  16  /  ALT  9<L>  /  AlkPhos  58  08-12      PT/INR - ( 13 Aug 2019 06:15 )   PT: 19.8 sec;   INR: 1.72          PTT - ( 13 Aug 2019 06:15 )  PTT:30.1 sec        CARDIAC MARKERS ( 12 Aug 2019 11:04 )  x     / 0.06 ng/mL / 61 U/L / x     / x      CARDIAC MARKERS ( 12 Aug 2019 05:01 )  x     / 0.06 ng/mL / 66 U/L / x     / <1.0 ng/mL                                  RADIOLOGY & ADDITIONAL STUDIES:    < from: Xray Chest 1 View- PORTABLE-Routine (08.13.19 @ 06:45) >  EXAM:  XR CHEST PORTABLE ROUTINE 1V                          PROCEDURE DATE:  08/13/2019          INTERPRETATION:  Clinical history/reason for exam: Fever.    Single frontal view.    Comparison: August 12, 2019.    Findings/  impression: Bilateral opacities/pleural effusions and cardiomegaly,   stable. Stable bony structures.    < end of copied text > Patient seen and evaluated at bedside.   He admits to feeling better today.  No acute events overnight.  URR was done on 8/12 for dialysis adequacy in the setting of possible uremic pericarditis and was >65 %, that is acceptable.   Last HD on 8/12 with UF 3.0 L as prescribed.            Meds:    acetaminophen   Tablet .. 650 milliGRAM(s) Oral every 6 hours PRN  apixaban 5 milliGRAM(s) Oral every 12 hours  benzonatate 100 milliGRAM(s) Oral three times a day PRN  guaiFENesin    Syrup 100 milliGRAM(s) Oral every 6 hours PRN  HYDROmorphone  Injectable 2 milliGRAM(s) IV Push every 4 hours PRN  HYDROmorphone  Injectable 1 milliGRAM(s) IV Push every 2 hours PRN  pantoprazole    Tablet 40 milliGRAM(s) Oral before breakfast      T(C): 37.9 (08-13-19 @ 09:10), Max: 37.9 (08-12-19 @ 16:21)  HR: 84 (08-13-19 @ 12:10) (82 - 100)  BP: 104/54 (08-13-19 @ 12:10) (102/56 - 122/67)  RR: 19 (08-13-19 @ 12:10) (18 - 20)  SpO2: 98% (08-13-19 @ 12:10) (91% - 100%)    Input/Output      08-12-19 @ 07:01  -  08-13-19 @ 07:00  --------------------------------------------------------  IN: 252 mL / OUT: 3400 mL / NET: -3148 mL          ROS:   Gen: + febrile  Cardiovascular: no chest pain  Respiratory: no cough, no sputum  Gastrointestinal: no nausea, no vomiting, no change in bowel habits  Neurologic: no headache  : no urinary symptoms        PHYSICAL EXAM:  GENERAL: NAD, alert  HEAD:  Atraumatic, Normocephalic  EYES: Bilateral conjunctiva and sclera normal   Oral cavity: Oral mucosa dry and pink  NECK: Neck supple, No JVD  CHEST/LUNG: decreased air entry, clear breath sounds bilateral  HEART: Regular rate and rhythm, no murmurs, no rubs appreciated  ABDOMEN: Soft, Nontender, BS+nt, No flank tenderness  EXTREMITIES: mild pitting LE edema bilateral  Neurology: AAOx3, no focal neurological deficit  SKIN: chronic skin changes  ACCESS: LUE AVF with good thrill and bruit           LABS:                                       9.3    7.97  )-----------( 212      ( 13 Aug 2019 06:15 )             28.4       08-13    138  |  96  |  54<H>  ----------------------------<  76  4.2   |  27  |  9.10<H>    Ca    9.0      13 Aug 2019 06:15  Phos  5.0     08-13  Mg     2.1     08-13    TPro  7.9  /  Alb  3.1<L>  /  TBili  0.3  /  DBili  x   /  AST  16  /  ALT  9<L>  /  AlkPhos  58  08-12      PT/INR - ( 13 Aug 2019 06:15 )   PT: 19.8 sec;   INR: 1.72          PTT - ( 13 Aug 2019 06:15 )  PTT:30.1 sec        CARDIAC MARKERS ( 12 Aug 2019 11:04 )  x     / 0.06 ng/mL / 61 U/L / x     / x      CARDIAC MARKERS ( 12 Aug 2019 05:01 )  x     / 0.06 ng/mL / 66 U/L / x     / <1.0 ng/mL                                  RADIOLOGY & ADDITIONAL STUDIES:    < from: Xray Chest 1 View- PORTABLE-Routine (08.13.19 @ 06:45) >  EXAM:  XR CHEST PORTABLE ROUTINE 1V                          PROCEDURE DATE:  08/13/2019          INTERPRETATION:  Clinical history/reason for exam: Fever.    Single frontal view.    Comparison: August 12, 2019.    Findings/  impression: Bilateral opacities/pleural effusions and cardiomegaly,   stable. Stable bony structures.    < end of copied text >

## 2019-08-13 NOTE — PROGRESS NOTE ADULT - PROBLEM SELECTOR PLAN 5
F: None  E: replete cautiously in the setting of ESRD  N: DASH/TLC renal diet   GI PPX: None  VTE PPX: Therapeutic AC in the setting of PE as above (Heparin gtt, Coumadin bridge)  FULL CODE   DISPO: 7[facility]

## 2019-08-13 NOTE — PROGRESS NOTE ADULT - PROBLEM SELECTOR PLAN 2
Pt with ESRD on HD, last (full session) on 8/09/19. Unclear etiology of ESRD.   -Continue home calcium acetate  -Renal consulted. Plan for HD today.   -F/u lab values ordered for HD Pt with ESRD on HD, last (full session) on 8/09/19. Unclear etiology of ESRD.   -Continue home calcium acetate  -Renal consulted. No plan for HD on 8/13  -F/u lab values ordered for HD

## 2019-08-13 NOTE — PROGRESS NOTE ADULT - PROBLEM SELECTOR PLAN 4
-Patient had chest pain overnight 8/12  -EKG and Trp ordered  -EKG likely indicative of Uremic Pericarditis per Cardiology, Trp 0.06. Patient has baseline Trp of 0.09, F/u trend  -TTE 8/12 follow-up results with cardiology  -Poor evidence that use of NSAID or colchicine in ESRD would be efficacious, although intensified HD has been shown to improve symptoms  -Repeat EKG 8/13 -Patient had chest pain overnight 8/12  -EKG and Trp ordered  -EKG likely indicative of Uremic Pericarditis per Cardiology, Trp 0.06. Patient has baseline Trp of 0.09, F/u trend  -TTE 8/12 follow-up results with cardiology  -Poor evidence that use of NSAID or colchicine in ESRD would be efficacious, although intensified HD has been shown to improve symptoms  -Cardiology not concerned for pericarditis. Signing off.

## 2019-08-14 LAB
ANION GAP SERPL CALC-SCNC: 14 MMOL/L — SIGNIFICANT CHANGE UP (ref 5–17)
ANION GAP SERPL CALC-SCNC: 19 MMOL/L — HIGH (ref 5–17)
APTT BLD: 32.6 SEC — SIGNIFICANT CHANGE UP (ref 27.5–36.3)
BUN SERPL-MCNC: 29 MG/DL — HIGH (ref 7–23)
BUN SERPL-MCNC: 42 MG/DL — HIGH (ref 7–23)
BUN SERPL-MCNC: 82 MG/DL — HIGH (ref 7–23)
CALCIUM SERPL-MCNC: 9.3 MG/DL — SIGNIFICANT CHANGE UP (ref 8.4–10.5)
CALCIUM SERPL-MCNC: 9.9 MG/DL — SIGNIFICANT CHANGE UP (ref 8.4–10.5)
CHLORIDE SERPL-SCNC: 93 MMOL/L — LOW (ref 96–108)
CHLORIDE SERPL-SCNC: 93 MMOL/L — LOW (ref 96–108)
CO2 SERPL-SCNC: 23 MMOL/L — SIGNIFICANT CHANGE UP (ref 22–31)
CO2 SERPL-SCNC: 29 MMOL/L — SIGNIFICANT CHANGE UP (ref 22–31)
CREAT SERPL-MCNC: 12.89 MG/DL — HIGH (ref 0.5–1.3)
CREAT SERPL-MCNC: 8.13 MG/DL — HIGH (ref 0.5–1.3)
CULTURE RESULTS: SIGNIFICANT CHANGE UP
CULTURE RESULTS: SIGNIFICANT CHANGE UP
GLUCOSE SERPL-MCNC: 131 MG/DL — HIGH (ref 70–99)
GLUCOSE SERPL-MCNC: 86 MG/DL — SIGNIFICANT CHANGE UP (ref 70–99)
HCT VFR BLD CALC: 27.8 % — LOW (ref 39–50)
HCT VFR BLD CALC: 30.7 % — LOW (ref 39–50)
HGB BLD-MCNC: 10 G/DL — LOW (ref 13–17)
HGB BLD-MCNC: 9.1 G/DL — LOW (ref 13–17)
INR BLD: 1.71 — HIGH (ref 0.88–1.16)
LACTATE SERPL-SCNC: 1.3 MMOL/L — SIGNIFICANT CHANGE UP (ref 0.5–2)
MCHC RBC-ENTMCNC: 28 PG — SIGNIFICANT CHANGE UP (ref 27–34)
MCHC RBC-ENTMCNC: 28.2 PG — SIGNIFICANT CHANGE UP (ref 27–34)
MCHC RBC-ENTMCNC: 32.6 GM/DL — SIGNIFICANT CHANGE UP (ref 32–36)
MCHC RBC-ENTMCNC: 32.7 GM/DL — SIGNIFICANT CHANGE UP (ref 32–36)
MCV RBC AUTO: 86 FL — SIGNIFICANT CHANGE UP (ref 80–100)
MCV RBC AUTO: 86.1 FL — SIGNIFICANT CHANGE UP (ref 80–100)
NRBC # BLD: 0 /100 WBCS — SIGNIFICANT CHANGE UP (ref 0–0)
NRBC # BLD: 0 /100 WBCS — SIGNIFICANT CHANGE UP (ref 0–0)
PLATELET # BLD AUTO: 253 K/UL — SIGNIFICANT CHANGE UP (ref 150–400)
PLATELET # BLD AUTO: 282 K/UL — SIGNIFICANT CHANGE UP (ref 150–400)
POTASSIUM SERPL-MCNC: 4.1 MMOL/L — SIGNIFICANT CHANGE UP (ref 3.5–5.3)
POTASSIUM SERPL-MCNC: 4.6 MMOL/L — SIGNIFICANT CHANGE UP (ref 3.5–5.3)
POTASSIUM SERPL-SCNC: 4.1 MMOL/L — SIGNIFICANT CHANGE UP (ref 3.5–5.3)
POTASSIUM SERPL-SCNC: 4.6 MMOL/L — SIGNIFICANT CHANGE UP (ref 3.5–5.3)
PROTHROM AB SERPL-ACNC: 19.6 SEC — HIGH (ref 10–12.9)
RBC # BLD: 3.23 M/UL — LOW (ref 4.2–5.8)
RBC # BLD: 3.57 M/UL — LOW (ref 4.2–5.8)
RBC # FLD: 14.3 % — SIGNIFICANT CHANGE UP (ref 10.3–14.5)
RBC # FLD: 14.5 % — SIGNIFICANT CHANGE UP (ref 10.3–14.5)
SODIUM SERPL-SCNC: 135 MMOL/L — SIGNIFICANT CHANGE UP (ref 135–145)
SODIUM SERPL-SCNC: 136 MMOL/L — SIGNIFICANT CHANGE UP (ref 135–145)
SPECIMEN SOURCE: SIGNIFICANT CHANGE UP
SPECIMEN SOURCE: SIGNIFICANT CHANGE UP
WBC # BLD: 8.26 K/UL — SIGNIFICANT CHANGE UP (ref 3.8–10.5)
WBC # BLD: 9.88 K/UL — SIGNIFICANT CHANGE UP (ref 3.8–10.5)
WBC # FLD AUTO: 8.26 K/UL — SIGNIFICANT CHANGE UP (ref 3.8–10.5)
WBC # FLD AUTO: 9.88 K/UL — SIGNIFICANT CHANGE UP (ref 3.8–10.5)

## 2019-08-14 PROCEDURE — 90935 HEMODIALYSIS ONE EVALUATION: CPT | Mod: GC

## 2019-08-14 PROCEDURE — 99233 SBSQ HOSP IP/OBS HIGH 50: CPT | Mod: GC

## 2019-08-14 PROCEDURE — 71045 X-RAY EXAM CHEST 1 VIEW: CPT | Mod: 26

## 2019-08-14 RX ORDER — SODIUM CHLORIDE 9 MG/ML
250 INJECTION INTRAMUSCULAR; INTRAVENOUS; SUBCUTANEOUS ONCE
Refills: 0 | Status: COMPLETED | OUTPATIENT
Start: 2019-08-14 | End: 2019-08-14

## 2019-08-14 RX ORDER — PIPERACILLIN AND TAZOBACTAM 4; .5 G/20ML; G/20ML
2.25 INJECTION, POWDER, LYOPHILIZED, FOR SOLUTION INTRAVENOUS EVERY 12 HOURS
Refills: 0 | Status: DISCONTINUED | OUTPATIENT
Start: 2019-08-14 | End: 2019-08-16

## 2019-08-14 RX ORDER — VANCOMYCIN HCL 1 G
1000 VIAL (EA) INTRAVENOUS ONCE
Refills: 0 | Status: COMPLETED | OUTPATIENT
Start: 2019-08-14 | End: 2019-08-14

## 2019-08-14 RX ORDER — HYDROMORPHONE HYDROCHLORIDE 2 MG/ML
0.5 INJECTION INTRAMUSCULAR; INTRAVENOUS; SUBCUTANEOUS EVERY 4 HOURS
Refills: 0 | Status: DISCONTINUED | OUTPATIENT
Start: 2019-08-14 | End: 2019-08-14

## 2019-08-14 RX ORDER — OXYCODONE AND ACETAMINOPHEN 5; 325 MG/1; MG/1
1 TABLET ORAL EVERY 6 HOURS
Refills: 0 | Status: DISCONTINUED | OUTPATIENT
Start: 2019-08-14 | End: 2019-08-14

## 2019-08-14 RX ORDER — HYDROMORPHONE HYDROCHLORIDE 2 MG/ML
2 INJECTION INTRAMUSCULAR; INTRAVENOUS; SUBCUTANEOUS EVERY 6 HOURS
Refills: 0 | Status: DISCONTINUED | OUTPATIENT
Start: 2019-08-14 | End: 2019-08-14

## 2019-08-14 RX ORDER — OXYCODONE AND ACETAMINOPHEN 5; 325 MG/1; MG/1
1 TABLET ORAL EVERY 6 HOURS
Refills: 0 | Status: DISCONTINUED | OUTPATIENT
Start: 2019-08-14 | End: 2019-08-16

## 2019-08-14 RX ADMIN — PANTOPRAZOLE SODIUM 40 MILLIGRAM(S): 20 TABLET, DELAYED RELEASE ORAL at 06:06

## 2019-08-14 RX ADMIN — Medication 650 MILLIGRAM(S): at 06:06

## 2019-08-14 RX ADMIN — Medication 100 MILLIGRAM(S): at 07:29

## 2019-08-14 RX ADMIN — Medication 650 MILLIGRAM(S): at 20:30

## 2019-08-14 RX ADMIN — Medication 250 MILLIGRAM(S): at 23:12

## 2019-08-14 RX ADMIN — APIXABAN 5 MILLIGRAM(S): 2.5 TABLET, FILM COATED ORAL at 18:59

## 2019-08-14 RX ADMIN — Medication 650 MILLIGRAM(S): at 07:01

## 2019-08-14 RX ADMIN — Medication 100 MILLIGRAM(S): at 21:48

## 2019-08-14 RX ADMIN — Medication 650 MILLIGRAM(S): at 19:30

## 2019-08-14 RX ADMIN — SODIUM CHLORIDE 500 MILLILITER(S): 9 INJECTION INTRAMUSCULAR; INTRAVENOUS; SUBCUTANEOUS at 22:16

## 2019-08-14 RX ADMIN — PIPERACILLIN AND TAZOBACTAM 200 GRAM(S): 4; .5 INJECTION, POWDER, LYOPHILIZED, FOR SOLUTION INTRAVENOUS at 23:12

## 2019-08-14 NOTE — PROGRESS NOTE ADULT - SUBJECTIVE AND OBJECTIVE BOX
Patient seen and evaluated while sitting comfortably in the chair.  No acute events overnight.  Denies sob at rest, does feel sob with exertion.  on 2L NC with Pox 94-9 8%.  Last HD on 8/12.          Meds:    acetaminophen   Tablet .. 650 milliGRAM(s) Oral every 6 hours PRN  apixaban 5 milliGRAM(s) Oral every 12 hours  benzonatate 100 milliGRAM(s) Oral three times a day PRN  guaiFENesin    Syrup 100 milliGRAM(s) Oral every 6 hours PRN  oxyCODONE    5 mG/acetaminophen 325 mG 1 Tablet(s) Oral every 6 hours  pantoprazole    Tablet 40 milliGRAM(s) Oral before breakfast      T(C): 37.3 (08-14-19 @ 06:55), Max: 38.2 (08-14-19 @ 06:00)  HR: 92 (08-14-19 @ 09:32) (89 - 95)  BP: 122/65 (08-14-19 @ 06:00) (113/67 - 128/75)  RR: 18 (08-14-19 @ 06:00) (16 - 18)  SpO2: 98% (08-14-19 @ 09:32) (94% - 98%)    Input/Output      08-13-19 @ 07:01  -  08-14-19 @ 07:00  --------------------------------------------------------  IN: 0 mL / OUT: 200 mL / NET: -200 mL            ROS:   Gen: +fever  Cardiovascular: no chest pain  Respiratory: +sob with exertion, no cough, no sputum  Gastrointestinal: no nausea, no vomiting, no change in bowel habits  Neurologic: no headache  : no urinary symptoms          PHYSICAL EXAM:  GENERAL: NAD, alert  EYES: Bilateral conjunctiva and sclera normal   Oral cavity: Oral mucosa moist and pink  NECK: Neck supple, No JVD  CHEST/LUNG: decreased air entry, clear breath sounds bilateral, no rub noted  HEART: Regular rate and rhythm, no murmurs, no rub appreciated  ABDOMEN: Soft, Nontender, BS+nt, No flank tenderness  EXTREMITIES: mild pitting LE edema bilateral  Neurology: AAOx3, no focal neurological deficit  SKIN: chronic skin changes  ACCESS: LUE AVF with good thrill and bruit           LABS:                                         9.3    7.97  )-----------( 212      ( 13 Aug 2019 06:15 )             28.4       08-13    138  |  96  |  54<H>  ----------------------------<  76  4.2   |  27  |  9.10<H>    Ca    9.0      13 Aug 2019 06:15  Phos  5.0     08-13  Mg     2.1     08-13        PT/INR - ( 13 Aug 2019 06:15 )   PT: 19.8 sec;   INR: 1.72          PTT - ( 13 Aug 2019 06:15 )  PTT:30.1 sec                                              RADIOLOGY & ADDITIONAL STUDIES:      < from: Xray Chest 1 View- PORTABLE-Routine (08.13.19 @ 06:45) >    EXAM:  XR CHEST PORTABLE ROUTINE 1V                          PROCEDURE DATE:  08/13/2019          INTERPRETATION:  Clinical history/reason for exam: Fever.    Single frontal view.    Comparison: August 12, 2019.    Findings/  impression: Bilateral opacities/pleural effusions and cardiomegaly,   stable. Stable bony structures.    < end of copied text >

## 2019-08-14 NOTE — PROGRESS NOTE ADULT - SUBJECTIVE AND OBJECTIVE BOX
Patient was seen and evaluated on dialysis.   HR: 85 (08-14-19 @ 12:20)  BP: 149/69 (08-14-19 @ 12:20)  Wt(kg): -- 86.8      08-14    135  |  93<L>  |  82<H>  ----------------------------<  86  4.1   |  23  |  12.89<H>    Ca    9.3      14 Aug 2019 13:13  Phos  5.0     08-13  Mg     2.1     08-13      Continue dialysis:   Dialyzer:   180    QB: 400  K bath: 2  Goal UF:    2.0   L   over       210        min  Patient is tolerating the procedure well.   Continue full treatment as prescribed. Patient was seen and evaluated on dialysis.   HR: 85 (08-14-19 @ 12:20)  BP: 149/69 (08-14-19 @ 12:20)  Wt(kg): -- 86.8      08-14    135  |  93<L>  |  82<H>  ----------------------------<  86  4.1   |  23  |  12.89<H>    Ca    9.3      14 Aug 2019 13:13  Phos  5.0     08-13  Mg     2.1     08-13      Continue dialysis:   Dialyzer:   180    QB: 400  K bath: 3  Goal UF:    2.0   L   over       210        min  Patient is tolerating the procedure well.   Continue full treatment as prescribed.

## 2019-08-14 NOTE — PROGRESS NOTE ADULT - ASSESSMENT
47 yo M w/ESRD on HD T, Th, Sat via left sided AV fistula presenting complaining of shortness of breath, fevers, chills at dialysis. Found to have b/l PE with pulmonary infarcts and right heart strain. Patient also with severe sepsis likely 2/2 to HD fistula infection and resulting multifactorial respiratory compromise. Transitioned to NC 2L satting well. Transferred from 7 to medical floor. Pleuritic pain controlled with percocet PO, pt being treated w/ eliquis for PE

## 2019-08-14 NOTE — PROGRESS NOTE ADULT - PROBLEM SELECTOR PLAN 3
Met SIRS criteria by HR, RR and Temp at admission. Pt w/ low grade temp of 100.7F oral, 99.7 after tylenol     - BCx NGTD since (08/09/2019)  - hold abx at this time   - if pt spikes again today (8/14), will consider full fever workup to r/o any hospital acquired infections  -will order doppler of left arm of fistula to make sure there is no abscess present  -diagnosis of exclusion is febrile 2/2 to PE

## 2019-08-14 NOTE — PROGRESS NOTE ADULT - PROBLEM SELECTOR PLAN 1
CT showed bilateral occlusive segmental and subsegmental PE with developing bilateral pulmonary infarct, likely 2/2 LUE AV fistula in setting of known thrombectomy. Echo without evidence of R heart strain. Echo with EF 55-60%, normal RV and LV function   -Has been on eliquis 5mg BID since 8/12 - will discuss w/ team if dose should be 10mg BID instead

## 2019-08-14 NOTE — PROGRESS NOTE ADULT - ASSESSMENT
49 yo M w PMHx of ESRD on HD T, Th, Sat via left sided AV fistula presenting complaining of shortness of breath, fevers, chills at dialysis. Found to have b/l PE with pulmonary infarcts and right heart strain.   Nephrology consult is placed in regards to ESRD on HD.    # ESRD on HD TTS via LUE AVF   - last HD completed on 8/12 with UF 3.0 L as prescribed  - volume status and electrolytes noted  - plan for HD today       # Chest pain  - uremic pericarditis less likely (regular HD, compliance, URR >65%)  - consider pleural effusion vs pneumonia vs PE/ lung infarction  - continue to spike fever  - CXR noted, stable  - renal dosing of antibiotics  - cardiology evaluation and recommendations    # Anemia  - monitor H/H, transfuse as needed

## 2019-08-14 NOTE — PROGRESS NOTE ADULT - SUBJECTIVE AND OBJECTIVE BOX
OVERNIGHT EVENTS: NIKHIL    SUBJECTIVE / INTERVAL HPI: Pt had a oral temp of 100.7F at 6 am, pt refused rectal temp, given Tylenol oral temp repeat of 99.2F. Pt endorsed having chills w/ some pleuritic chest pain and cough producing clear phlem. Pt producing yellow urine. Denies sob, fever, abdominal pain, changes in urination, or pain at his AV fistula.    T(F): 99.2 (08-14-19 @ 06:55)  HR: 92 (08-14-19 @ 09:32)  BP: 122/65 (08-14-19 @ 06:00)  RR: 18 (08-14-19 @ 06:00)  SpO2: 98% (08-14-19 @ 09:32)    PHYSICAL EXAM:    General: NAD, no accessory muscle use, no respiratory distress, on 2L NC satting well, slows down his words when speaking  HEENT: NC/AT; PERRL, anicteric sclera; MMM  Neck: supple, no JVD  Cardiovascular: +S1/S2; RRR  Respiratory: CTA in upper fields, no wheezing or crackles. Some crackles b/l in lower lung fields  Gastrointestinal: soft, NT/ND; +BSx4  Extremities: WWP; no edema, clubbing or cyanosis, Has AV fistula in left arm-no signs of erythema, swelling, or infection  Vascular: 2+ radial, DP/PT pulses B/L  Neurological: AAOx3; no focal deficits    .  LABS:                         9.3    7.97  )-----------( 212      ( 13 Aug 2019 06:15 )             28.4     08-13    138  |  96  |  54<H>  ----------------------------<  76  4.2   |  27  |  9.10<H>    Ca    9.0      13 Aug 2019 06:15  Phos  5.0     08-13  Mg     2.1     08-13      PT/INR - ( 13 Aug 2019 06:15 )   PT: 19.8 sec;   INR: 1.72          PTT - ( 13 Aug 2019 06:15 )  PTT:30.1 sec              RADIOLOGY, EKG & ADDITIONAL TESTS: Reviewed.

## 2019-08-14 NOTE — PROGRESS NOTE ADULT - PROBLEM SELECTOR PLAN 4
-pt had pleuritic chest pain  -Cardiology not concerned for pericarditis + signed off  -most likely from PE

## 2019-08-15 DIAGNOSIS — R50.9 FEVER, UNSPECIFIED: ICD-10-CM

## 2019-08-15 LAB
ANION GAP SERPL CALC-SCNC: 15 MMOL/L — SIGNIFICANT CHANGE UP (ref 5–17)
APPEARANCE UR: CLEAR — SIGNIFICANT CHANGE UP
BACTERIA # UR AUTO: PRESENT /HPF
BILIRUB UR-MCNC: NEGATIVE — SIGNIFICANT CHANGE UP
BUN SERPL-MCNC: 49 MG/DL — HIGH (ref 7–23)
CALCIUM SERPL-MCNC: 9.4 MG/DL — SIGNIFICANT CHANGE UP (ref 8.4–10.5)
CHLORIDE SERPL-SCNC: 94 MMOL/L — LOW (ref 96–108)
CO2 SERPL-SCNC: 27 MMOL/L — SIGNIFICANT CHANGE UP (ref 22–31)
COLOR SPEC: YELLOW — SIGNIFICANT CHANGE UP
CREAT SERPL-MCNC: 9.2 MG/DL — HIGH (ref 0.5–1.3)
DIFF PNL FLD: ABNORMAL
EPI CELLS # UR: SIGNIFICANT CHANGE UP /HPF (ref 0–5)
GLUCOSE SERPL-MCNC: 90 MG/DL — SIGNIFICANT CHANGE UP (ref 70–99)
GLUCOSE UR QL: NEGATIVE — SIGNIFICANT CHANGE UP
GRAM STN FLD: SIGNIFICANT CHANGE UP
HCT VFR BLD CALC: 29.3 % — LOW (ref 39–50)
HGB BLD-MCNC: 9.4 G/DL — LOW (ref 13–17)
KETONES UR-MCNC: NEGATIVE — SIGNIFICANT CHANGE UP
LEUKOCYTE ESTERASE UR-ACNC: NEGATIVE — SIGNIFICANT CHANGE UP
MAGNESIUM SERPL-MCNC: 2.3 MG/DL — SIGNIFICANT CHANGE UP (ref 1.6–2.6)
MCHC RBC-ENTMCNC: 28 PG — SIGNIFICANT CHANGE UP (ref 27–34)
MCHC RBC-ENTMCNC: 32.1 GM/DL — SIGNIFICANT CHANGE UP (ref 32–36)
MCV RBC AUTO: 87.2 FL — SIGNIFICANT CHANGE UP (ref 80–100)
MRSA PCR RESULT.: NEGATIVE — SIGNIFICANT CHANGE UP
NITRITE UR-MCNC: NEGATIVE — SIGNIFICANT CHANGE UP
NRBC # BLD: 0 /100 WBCS — SIGNIFICANT CHANGE UP (ref 0–0)
PH UR: 7.5 — SIGNIFICANT CHANGE UP (ref 5–8)
PHOSPHATE SERPL-MCNC: 4.9 MG/DL — HIGH (ref 2.5–4.5)
PLATELET # BLD AUTO: 264 K/UL — SIGNIFICANT CHANGE UP (ref 150–400)
POTASSIUM SERPL-MCNC: 4.3 MMOL/L — SIGNIFICANT CHANGE UP (ref 3.5–5.3)
POTASSIUM SERPL-SCNC: 4.3 MMOL/L — SIGNIFICANT CHANGE UP (ref 3.5–5.3)
PROT UR-MCNC: 100 MG/DL
RAPID RVP RESULT: SIGNIFICANT CHANGE UP
RBC # BLD: 3.36 M/UL — LOW (ref 4.2–5.8)
RBC # FLD: 14.2 % — SIGNIFICANT CHANGE UP (ref 10.3–14.5)
RBC CASTS # UR COMP ASSIST: < 5 /HPF — SIGNIFICANT CHANGE UP
S AUREUS DNA NOSE QL NAA+PROBE: NEGATIVE — SIGNIFICANT CHANGE UP
SODIUM SERPL-SCNC: 136 MMOL/L — SIGNIFICANT CHANGE UP (ref 135–145)
SP GR SPEC: 1.01 — SIGNIFICANT CHANGE UP (ref 1–1.03)
SPECIMEN SOURCE: SIGNIFICANT CHANGE UP
UROBILINOGEN FLD QL: 1 E.U./DL — SIGNIFICANT CHANGE UP
VANCOMYCIN TROUGH SERPL-MCNC: 28.5 UG/ML — CRITICAL HIGH (ref 10–20)
WBC # BLD: 8.44 K/UL — SIGNIFICANT CHANGE UP (ref 3.8–10.5)
WBC # FLD AUTO: 8.44 K/UL — SIGNIFICANT CHANGE UP (ref 3.8–10.5)
WBC UR QL: < 5 /HPF — SIGNIFICANT CHANGE UP

## 2019-08-15 PROCEDURE — 99232 SBSQ HOSP IP/OBS MODERATE 35: CPT | Mod: GC

## 2019-08-15 PROCEDURE — 93990 DOPPLER FLOW TESTING: CPT | Mod: 26

## 2019-08-15 PROCEDURE — 99233 SBSQ HOSP IP/OBS HIGH 50: CPT

## 2019-08-15 RX ORDER — SODIUM CHLORIDE 9 MG/ML
500 INJECTION INTRAMUSCULAR; INTRAVENOUS; SUBCUTANEOUS ONCE
Refills: 0 | Status: COMPLETED | OUTPATIENT
Start: 2019-08-15 | End: 2019-08-15

## 2019-08-15 RX ORDER — SODIUM CHLORIDE 9 MG/ML
500 INJECTION INTRAMUSCULAR; INTRAVENOUS; SUBCUTANEOUS ONCE
Refills: 0 | Status: DISCONTINUED | OUTPATIENT
Start: 2019-08-15 | End: 2019-08-15

## 2019-08-15 RX ADMIN — OXYCODONE AND ACETAMINOPHEN 1 TABLET(S): 5; 325 TABLET ORAL at 07:24

## 2019-08-15 RX ADMIN — APIXABAN 5 MILLIGRAM(S): 2.5 TABLET, FILM COATED ORAL at 17:44

## 2019-08-15 RX ADMIN — Medication 100 MILLIGRAM(S): at 21:30

## 2019-08-15 RX ADMIN — OXYCODONE AND ACETAMINOPHEN 1 TABLET(S): 5; 325 TABLET ORAL at 17:44

## 2019-08-15 RX ADMIN — OXYCODONE AND ACETAMINOPHEN 1 TABLET(S): 5; 325 TABLET ORAL at 18:44

## 2019-08-15 RX ADMIN — PIPERACILLIN AND TAZOBACTAM 200 GRAM(S): 4; .5 INJECTION, POWDER, LYOPHILIZED, FOR SOLUTION INTRAVENOUS at 21:30

## 2019-08-15 RX ADMIN — APIXABAN 5 MILLIGRAM(S): 2.5 TABLET, FILM COATED ORAL at 06:24

## 2019-08-15 RX ADMIN — OXYCODONE AND ACETAMINOPHEN 1 TABLET(S): 5; 325 TABLET ORAL at 06:24

## 2019-08-15 RX ADMIN — SODIUM CHLORIDE 500 MILLILITER(S): 9 INJECTION INTRAMUSCULAR; INTRAVENOUS; SUBCUTANEOUS at 09:47

## 2019-08-15 RX ADMIN — PANTOPRAZOLE SODIUM 40 MILLIGRAM(S): 20 TABLET, DELAYED RELEASE ORAL at 06:24

## 2019-08-15 RX ADMIN — PIPERACILLIN AND TAZOBACTAM 200 GRAM(S): 4; .5 INJECTION, POWDER, LYOPHILIZED, FOR SOLUTION INTRAVENOUS at 11:04

## 2019-08-15 NOTE — PROGRESS NOTE ADULT - SUBJECTIVE AND OBJECTIVE BOX
Patient seen and evaluated at bedside.  s/p HD on 8/14.          Meds:    acetaminophen   Tablet .. 650 milliGRAM(s) Oral every 6 hours PRN  apixaban 5 milliGRAM(s) Oral every 12 hours  benzonatate 100 milliGRAM(s) Oral three times a day PRN  guaiFENesin    Syrup 100 milliGRAM(s) Oral every 6 hours PRN  oxyCODONE    5 mG/acetaminophen 325 mG 1 Tablet(s) Oral every 6 hours  pantoprazole    Tablet 40 milliGRAM(s) Oral before breakfast      T(C): 37.3 (08-14-19 @ 06:55), Max: 38.2 (08-14-19 @ 06:00)  HR: 92 (08-14-19 @ 09:32) (89 - 95)  BP: 122/65 (08-14-19 @ 06:00) (113/67 - 128/75)  RR: 18 (08-14-19 @ 06:00) (16 - 18)  SpO2: 98% (08-14-19 @ 09:32) (94% - 98%)    Input/Output      08-13-19 @ 07:01  -  08-14-19 @ 07:00  --------------------------------------------------------  IN: 0 mL / OUT: 200 mL / NET: -200 mL            ROS:   Gen: +fever  Cardiovascular: no chest pain  Respiratory: +sob with exertion, no cough, no sputum  Gastrointestinal: no nausea, no vomiting, no change in bowel habits  Neurologic: no headache  : no urinary symptoms          PHYSICAL EXAM:  GENERAL: NAD, alert  EYES: Bilateral conjunctiva and sclera normal   Oral cavity: Oral mucosa moist and pink  NECK: Neck supple, No JVD  CHEST/LUNG: decreased air entry, clear breath sounds bilateral, no rub noted  HEART: Regular rate and rhythm, no murmurs, no rub appreciated  ABDOMEN: Soft, Nontender, BS+nt, No flank tenderness  EXTREMITIES: mild pitting LE edema bilateral  Neurology: AAOx3, no focal neurological deficit  SKIN: chronic skin changes  ACCESS: LUE AVF with good thrill and bruit           LABS:                                         9.3    7.97  )-----------( 212      ( 13 Aug 2019 06:15 )             28.4       08-13    138  |  96  |  54<H>  ----------------------------<  76  4.2   |  27  |  9.10<H>    Ca    9.0      13 Aug 2019 06:15  Phos  5.0     08-13  Mg     2.1     08-13        PT/INR - ( 13 Aug 2019 06:15 )   PT: 19.8 sec;   INR: 1.72          PTT - ( 13 Aug 2019 06:15 )  PTT:30.1 sec                                              RADIOLOGY & ADDITIONAL STUDIES:      < from: Xray Chest 1 View- PORTABLE-Routine (08.13.19 @ 06:45) >    EXAM:  XR CHEST PORTABLE ROUTINE 1V                          PROCEDURE DATE:  08/13/2019          INTERPRETATION:  Clinical history/reason for exam: Fever.    Single frontal view.    Comparison: August 12, 2019.    Findings/  impression: Bilateral opacities/pleural effusions and cardiomegaly,   stable. Stable bony structures.    < end of copied text > Patient seen and evaluated at bedside.  C/o productive cough and dizziness.  s/p HD on 8/14 with UF 2.0 L as prescribed.  overnight patient became febrile, hypotensive, likely due to pneumonia.  Met sepsis criteria, responded to IV hydration and started on antibiotics.          Meds:    acetaminophen   Tablet .. 650 milliGRAM(s) Oral every 6 hours PRN  apixaban 5 milliGRAM(s) Oral every 12 hours  benzonatate 100 milliGRAM(s) Oral three times a day PRN  guaiFENesin    Syrup 100 milliGRAM(s) Oral every 6 hours PRN  oxyCODONE    5 mG/acetaminophen 325 mG 1 Tablet(s) Oral every 6 hours  pantoprazole    Tablet 40 milliGRAM(s) Oral before breakfast  piperacillin/tazobactam IVPB.. 2.25 Gram(s) IV Intermittent every 12 hours      T(C): 36.7 (08-15-19 @ 11:00), Max: 38.6 (08-14-19 @ 19:27)  HR: 82 (08-15-19 @ 11:00) (78 - 92)  BP: 110/67 (08-15-19 @ 11:00) (97/57 - 137/77)  RR: 18 (08-15-19 @ 11:00) (17 - 19)  SpO2: 99% (08-15-19 @ 11:00) (94% - 99%)    Input/Output      08-14-19 @ 07:01  -  08-15-19 @ 07:00  --------------------------------------------------------  IN: 600 mL / OUT: 2000 mL / NET: -1400 mL    08-15-19 @ 07:01  -  08-15-19 @ 14:17  --------------------------------------------------------  IN: 600 mL / OUT: 0 mL / NET: 600 mL      ROS:   Gen: +fever  Cardiovascular: no chest pain  Respiratory: + productive cough  Gastrointestinal: no nausea, no vomiting, no change in bowel habits  Neurologic: + dizziness, no headache  : no urinary symptoms          PHYSICAL EXAM:  GENERAL: NAD, alert  EYES: Bilateral conjunctiva and sclera normal   Oral cavity: Oral mucosa moist and pink  NECK: Neck supple, No JVD  CHEST/LUNG: decreased air entry, clear breath sounds bilateral, no rub noted  HEART: Regular rate and rhythm, no murmurs, no rub appreciated  ABDOMEN: Soft, Nontender, BS+nt, No flank tenderness  EXTREMITIES: mild pitting LE edema bilateral  Neurology: AAOx3, no focal neurological deficit  SKIN: chronic skin changes  ACCESS: LUE AVF with good thrill and bruit           LABS:                                           9.4    8.44  )-----------( 264      ( 15 Aug 2019 06:50 )             29.3       08-15    136  |  94<L>  |  49<H>  ----------------------------<  90  4.3   |  27  |  9.20<H>    Ca    9.4      15 Aug 2019 06:50  Phos  4.9     08-15  Mg     2.3     08-15        PT/INR - ( 14 Aug 2019 22:33 )   PT: 19.6 sec;   INR: 1.71          PTT - ( 14 Aug 2019 22:33 )  PTT:32.6 sec                                                        RADIOLOGY & ADDITIONAL STUDIES: Patient seen and evaluated at bedside.  C/o productive cough and dizziness.  s/p HD on 8/14 with UF 2.0 L as prescribed.  overnight patient became febrile, hypotensive, likely due to pneumonia.  Met sepsis criteria, responded to IV hydration and started on antibiotics.          Meds:    acetaminophen   Tablet .. 650 milliGRAM(s) Oral every 6 hours PRN  apixaban 5 milliGRAM(s) Oral every 12 hours  benzonatate 100 milliGRAM(s) Oral three times a day PRN  guaiFENesin    Syrup 100 milliGRAM(s) Oral every 6 hours PRN  oxyCODONE    5 mG/acetaminophen 325 mG 1 Tablet(s) Oral every 6 hours  pantoprazole    Tablet 40 milliGRAM(s) Oral before breakfast  piperacillin/tazobactam IVPB.. 2.25 Gram(s) IV Intermittent every 12 hours      T(C): 36.7 (08-15-19 @ 11:00), Max: 38.6 (08-14-19 @ 19:27)  HR: 82 (08-15-19 @ 11:00) (78 - 92)  BP: 110/67 (08-15-19 @ 11:00) (97/57 - 137/77)  RR: 18 (08-15-19 @ 11:00) (17 - 19)  SpO2: 99% (08-15-19 @ 11:00) (94% - 99%)    Input/Output      08-14-19 @ 07:01  -  08-15-19 @ 07:00  --------------------------------------------------------  IN: 600 mL / OUT: 2000 mL / NET: -1400 mL    08-15-19 @ 07:01  -  08-15-19 @ 14:17  --------------------------------------------------------  IN: 600 mL / OUT: 0 mL / NET: 600 mL      ROS:   Gen: +fever  Cardiovascular: no chest pain  Respiratory: + productive cough, no sig SOB  Gastrointestinal: no nausea, no vomiting, no change in bowel habits  Neurologic: + dizziness, no headache  : no urinary symptoms          PHYSICAL EXAM:  GENERAL: NAD, alert  EYES: Bilateral conjunctiva and sclera normal   Oral cavity: Oral mucosa moist and pink  NECK: Neck supple, No JVD  CHEST/LUNG: decreased air entry, clear breath sounds bilateral, no rub noted  HEART: Regular rate and rhythm, no murmurs, no rub appreciated  ABDOMEN: Soft, Nontender, BS+nt, No flank tenderness  EXTREMITIES: mild pitting LE edema bilateral  Neurology: AAOx3, no focal neurological deficit  SKIN: chronic skin changes  ACCESS: LUE AVF with good thrill and bruit           LABS:                                           9.4    8.44  )-----------( 264      ( 15 Aug 2019 06:50 )             29.3       08-15    136  |  94<L>  |  49<H>  ----------------------------<  90  4.3   |  27  |  9.20<H>    Ca    9.4      15 Aug 2019 06:50  Phos  4.9     08-15  Mg     2.3     08-15        PT/INR - ( 14 Aug 2019 22:33 )   PT: 19.6 sec;   INR: 1.71          PTT - ( 14 Aug 2019 22:33 )  PTT:32.6 sec                                                        RADIOLOGY & ADDITIONAL STUDIES:

## 2019-08-15 NOTE — PROGRESS NOTE ADULT - PROBLEM SELECTOR PLAN 4
-pt had pleuritic chest pain  -Cardiology not concerned for pericarditis + signed off  -most likely from PE Met SIRS criteria by HR, RR and Temp at admission. Pt w/ low grade temp of 100.7F oral, 99.7 after tylenol     - BCx NGTD since (08/09/2019)  - hold abx at this time   - currently resolved

## 2019-08-15 NOTE — PROGRESS NOTE ADULT - PROBLEM SELECTOR PLAN 5
F: None  E: replete cautiously in the setting of ESRD  N: DASH/TLC renal diet   GI PPX: None  VTE PPX: Therapeutic AC in the setting of PE as above (Heparin gtt, Coumadin bridge)  FULL CODE   DISPO: 7[facility] -pt had pleuritic chest pain  -Cardiology not concerned for pericarditis + signed off  -most likely from PE  - currently resolved

## 2019-08-15 NOTE — PROGRESS NOTE ADULT - PROBLEM SELECTOR PLAN 3
Met SIRS criteria by HR, RR and Temp at admission. Pt w/ low grade temp of 100.7F oral, 99.7 after tylenol     - BCx NGTD since (08/09/2019)  - hold abx at this time   - if pt spikes again today (8/14), will consider full fever workup to r/o any hospital acquired infections  -will order doppler of left arm of fistula to make sure there is no abscess present  -diagnosis of exclusion is febrile 2/2 to PE Pt with ESRD on HD, last (full session) on 8/09/19. Unclear etiology of ESRD.   -renal following  -HD yesterday- had 2L taken off    #Orthostatic hypotension  -pt s/p dialysis and has been feeling light headed  -positive orthostatics o/n given 250cc NS bolus  -positive orthostatics this am, given 500 cc NS bolus (w/ renal approval)   -pt had lightheadedness following treatment

## 2019-08-15 NOTE — PROVIDER CONTACT NOTE (OTHER) - BACKGROUND
Pt admitted for PE. He is a hemodialysis patient who went for treatment today. 2.0 L taken out per patient.

## 2019-08-15 NOTE — PROGRESS NOTE ADULT - PROBLEM SELECTOR PLAN 1
CT showed bilateral occlusive segmental and subsegmental PE with developing bilateral pulmonary infarct, likely 2/2 LUE AV fistula in setting of known thrombectomy. Echo without evidence of R heart strain. Echo with EF 55-60%, normal RV and LV function   -Has been on eliquis 5mg BID since 8/12 - will discuss w/ team if dose should be 10mg BID instead 8/14- febrile 101.4F o/n. Pt has been having low grade fevers and presumed to be from current PE. Fever seems to be uptrending. Did fever workup. No leukocytosis, tachycardia, or elevated lactate. DDX: 2/2 to PE vs bacteremia vs HAP vs UTI vs fistula abcess. Currently afebrile    -stopped vanc due to negative MRSA/MSSA swab, c/w zosyn (renally dose) until cultures come back  -f/u official cxr read  -f/u u/a w/ culture  -f/u bcx  -f/u fistula ultrasound/doppler

## 2019-08-15 NOTE — PROGRESS NOTE ADULT - ASSESSMENT
49 yo M w/ESRD on HD T, Th, Sat via left sided AV fistula presenting complaining of shortness of breath, fevers, chills at dialysis. Found to have b/l PE with pulmonary infarcts and right heart strain. Patient also with severe sepsis likely 2/2 to HD fistula infection and resulting multifactorial respiratory compromise. Transitioned to NC 2L satting well. Transferred from 7 to medical floor. Pleuritic pain controlled with percocet PO, pt being treated w/ eliquis for PE

## 2019-08-15 NOTE — PROGRESS NOTE ADULT - ASSESSMENT
47 yo M w PMHx of ESRD on HD T, Th, Sat via left sided AV fistula presenting complaining of shortness of breath, fevers, chills at dialysis. Found to have b/l PE with pulmonary infarcts and right heart strain.   Nephrology consult is placed in regards to ESRD on HD.    # ESRD on HD TTS via LUE AVF   - last HD completed on 8/12 with UF 3.0 L as prescribed  - volume status and electrolytes noted  - plan for HD today       # Chest pain  - uremic pericarditis less likely (regular HD, compliance, URR >65%)  - consider pleural effusion vs pneumonia vs PE/ lung infarction  - continue to spike fever  - CXR noted, stable  - renal dosing of antibiotics  - cardiology evaluation and recommendations    # Anemia  - monitor H/H, transfuse as needed 49 yo M w PMHx of ESRD on HD T, Th, Sat via left sided AV fistula presenting complaining of shortness of breath, fevers, chills at dialysis. Found to have b/l PE with pulmonary infarcts and right heart strain.   Nephrology consult is placed in regards to ESRD on HD.    # ESRD on HD TTS via LUE AVF   - last HD completed on 8/14 with UF 2.0 L as prescribed  - volume status and electrolytes noted, acceptable  - defer HD today  - reassess for need of HD on 8/16    # Pneumonia, likely  - uremic pericarditis less likely (regular HD, compliance, URR >65%)  - renal dosing of antibiotics  - vanco trough 28.5, hold on vanco    # Anemia  - monitor H/H  - transfusion as per primary team

## 2019-08-15 NOTE — PROGRESS NOTE ADULT - SUBJECTIVE AND OBJECTIVE BOX
OVERNIGHT EVENTS: Pt had rectal temp 101.4, 97/57, Spo2 2L 94%, and had bcx and cxr done    SUBJECTIVE / INTERVAL HPI: Pt had a oral temp of 100.7F at 6 am, pt refused rectal temp, given Tylenol oral temp repeat of 99.2F. Pt endorsed having chills w/ some pleuritic chest pain and cough producing clear phlem. Pt producing yellow urine. Denies sob, fever, abdominal pain, changes in urination, or pain at his AV fistula.    T(F): 99.2 (08-14-19 @ 06:55)  HR: 92 (08-14-19 @ 09:32)  BP: 122/65 (08-14-19 @ 06:00)  RR: 18 (08-14-19 @ 06:00)  SpO2: 98% (08-14-19 @ 09:32)    PHYSICAL EXAM:    General: NAD, no accessory muscle use, no respiratory distress, on 2L NC satting well, slows down his words when speaking  HEENT: NC/AT; PERRL, anicteric sclera; MMM  Neck: supple, no JVD  Cardiovascular: +S1/S2; RRR  Respiratory: CTA in upper fields, no wheezing or crackles. Some crackles b/l in lower lung fields  Gastrointestinal: soft, NT/ND; +BSx4  Extremities: WWP; no edema, clubbing or cyanosis, Has AV fistula in left arm-no signs of erythema, swelling, or infection  Vascular: 2+ radial, DP/PT pulses B/L  Neurological: AAOx3; no focal deficits    .  LABS:                         9.3    7.97  )-----------( 212      ( 13 Aug 2019 06:15 )             28.4     08-13    138  |  96  |  54<H>  ----------------------------<  76  4.2   |  27  |  9.10<H>    Ca    9.0      13 Aug 2019 06:15  Phos  5.0     08-13  Mg     2.1     08-13      PT/INR - ( 13 Aug 2019 06:15 )   PT: 19.8 sec;   INR: 1.72          PTT - ( 13 Aug 2019 06:15 )  PTT:30.1 sec              RADIOLOGY, EKG & ADDITIONAL TESTS: Reviewed. OVERNIGHT EVENTS: Pt had rectal temp 101.4, 97/57, Spo2 2L 94%, and had bcx and cxr done, given 1 dose vanc and started on zosyn    SUBJECTIVE / INTERVAL HPI: Pt w/ sob, productive cougghhad a oral temp of 100.7F at 6 am, pt refused rectal temp, given Tylenol oral temp repeat of 99.2F. Pt endorsed having chills w/ some pleuritic chest pain and cough producing clear phlem. Pt producing yellow urine. Denies sob, fever, abdominal pain, changes in urination, or pain at his AV fistula.    T(F): 99.2 (08-14-19 @ 06:55)  HR: 92 (08-14-19 @ 09:32)  BP: 122/65 (08-14-19 @ 06:00)  RR: 18 (08-14-19 @ 06:00)  SpO2: 98% (08-14-19 @ 09:32)    PHYSICAL EXAM:    General: NAD, no accessory muscle use, no respiratory distress, on 2L NC satting well, slows down his words when speaking  HEENT: NC/AT; PERRL, anicteric sclera; MMM  Neck: supple, no JVD  Cardiovascular: +S1/S2; RRR  Respiratory: CTA in upper fields, no wheezing or crackles. Some crackles b/l in lower lung fields  Gastrointestinal: soft, NT/ND; +BSx4  Extremities: WWP; no edema, clubbing or cyanosis, Has AV fistula in left arm-no signs of erythema, swelling, or infection  Vascular: 2+ radial, DP/PT pulses B/L  Neurological: AAOx3; no focal deficits    .  LABS:                         9.3    7.97  )-----------( 212      ( 13 Aug 2019 06:15 )             28.4     08-13    138  |  96  |  54<H>  ----------------------------<  76  4.2   |  27  |  9.10<H>    Ca    9.0      13 Aug 2019 06:15  Phos  5.0     08-13  Mg     2.1     08-13      PT/INR - ( 13 Aug 2019 06:15 )   PT: 19.8 sec;   INR: 1.72          PTT - ( 13 Aug 2019 06:15 )  PTT:30.1 sec              RADIOLOGY, EKG & ADDITIONAL TESTS: Reviewed. OVERNIGHT EVENTS: Pt had rectal temp 101.4, 97/57, Spo2 2L 94%, and had bcx and cxr done, given 1 dose vanc and started on zosyn    SUBJECTIVE / INTERVAL HPI: Pt w/ sob this am (on 2L) + slight headache, productive cough which is causing his chest pain. Pt producing yellow urine. Denies sob, fever, abdominal pain, changes in urination, or pain at his AV fistula.    T(F): 99.2 (08-14-19 @ 06:55)  HR: 92 (08-14-19 @ 09:32)  BP: 122/65 (08-14-19 @ 06:00)  RR: 18 (08-14-19 @ 06:00)  SpO2: 98% (08-14-19 @ 09:32)    PHYSICAL EXAM:    General: NAD, no accessory muscle use, no respiratory distress, on 2L NC satting well, slows down his words when speaking  HEENT: NC/AT; PERRL, anicteric sclera; MMM  Neck: supple, no JVD  Cardiovascular: +S1/S2; RRR  Respiratory: CTA in upper fields, no wheezing or crackles. Some crackles b/l in lower lung fields  Gastrointestinal: soft, NT/ND; +BSx4  Extremities: WWP; no edema, clubbing or cyanosis, Has AV fistula in left arm-no signs of erythema, swelling, or infection  Vascular: 2+ radial, DP/PT pulses B/L  Neurological: AAOx3; no focal deficits    .  LABS:                         9.3    7.97  )-----------( 212      ( 13 Aug 2019 06:15 )             28.4     08-13    138  |  96  |  54<H>  ----------------------------<  76  4.2   |  27  |  9.10<H>    Ca    9.0      13 Aug 2019 06:15  Phos  5.0     08-13  Mg     2.1     08-13      PT/INR - ( 13 Aug 2019 06:15 )   PT: 19.8 sec;   INR: 1.72          PTT - ( 13 Aug 2019 06:15 )  PTT:30.1 sec              RADIOLOGY, EKG & ADDITIONAL TESTS: Reviewed. OVERNIGHT EVENTS: Pt had rectal temp 101.4, 97/57, Spo2 2L 94%, and had bcx and cxr done, given 1 dose vanc and started on zosyn    SUBJECTIVE / INTERVAL HPI: Pt w/ sob this am (on 2L) + slight headache + chills, productive cough which is causing his chest pain. Pt producing yellow urine and denies dysuria or polyuria. Denies fever, rigors, abdominal pain, pain at his AV fistula,     T(F): 98.5 (08-15-19 @ 06:11)  HR: 87 (08-15-19 @ 06:11)  BP: 126/69 (08-15-19 @ 06:11)  RR: 18 (08-15-19 @ 06:11)  SpO2: 94% (08-15-19 @ 06:11)    PHYSICAL EXAM:    General: NAD, no accessory muscle use, no respiratory distress, on 2L NC satting well, slows down his words when speaking  HEENT: NC/AT; PERRL, anicteric sclera; MMM  Neck: supple, no JVD  Cardiovascular: +S1/S2; RRR  Respiratory: CTA in upper fields, no wheezing or crackles. Some crackles b/l in lower lung fields  Gastrointestinal: soft, NT/ND; +BSx4  Extremities: WWP; no edema, clubbing or cyanosis, Has AV fistula in left arm-no signs of erythema, swelling, or infection  Vascular: 2+ radial, DP/PT pulses B/L  Neurological: AAOx3; no focal deficits    .  LABS:                         9.3    7.97  )-----------( 212      ( 13 Aug 2019 06:15 )             28.4     08-13    138  |  96  |  54<H>  ----------------------------<  76  4.2   |  27  |  9.10<H>    Ca    9.0      13 Aug 2019 06:15  Phos  5.0     08-13  Mg     2.1     08-13      PT/INR - ( 13 Aug 2019 06:15 )   PT: 19.8 sec;   INR: 1.72          PTT - ( 13 Aug 2019 06:15 )  PTT:30.1 sec              RADIOLOGY, EKG & ADDITIONAL TESTS: Reviewed. OVERNIGHT EVENTS: Pt had rectal temp 101.4, 97/57, other wise VSS. Had full fever workup and had bcx and cxr done, given 1 dose vanc and started on zosyn, +orthostatics given 250cc bolus NS    SUBJECTIVE / INTERVAL HPI: Pt w/ sob this am (on 2L) + slight headache + chills, productive cough which is causing his chest pain. Pt producing yellow urine and denies dysuria or polyuria. Denies fever, rigors, abdominal pain, pain at his AV fistula, lightheadedness or dizziness. Pt says he is using his incentive spirometry     T(F): 98.5 (08-15-19 @ 06:11)  HR: 87 (08-15-19 @ 06:11)  BP: 126/69 (08-15-19 @ 06:11)  RR: 18 (08-15-19 @ 06:11)  SpO2: 94% (08-15-19 @ 06:11)    PHYSICAL EXAM:    General: Pt has sob on 2L while talking, no accessory muscle use, no respiratory distress, slows down his words when speaking  HEENT: NC/AT; PERRL, anicteric sclera; MMM  Neck: supple, no JVD  Cardiovascular: +S1/S2; RRR  Respiratory: CTA in upper fields, no wheezing or crackles. Some crackles b/l in lower lung fields  Gastrointestinal: soft, NT/ND; +BSx4  Extremities: WWP; no leg edema, clubbing or cyanosis, Has AV fistula in left arm-no signs of erythema, swelling, or infection  Vascular: 2+ radial, DP/PT pulses B/L  Neurological: AAOx3; no focal deficits       .  LABS:                         9.4    8.44  )-----------( 264      ( 15 Aug 2019 06:50 )             29.3     08-15    136  |  94<L>  |  49<H>  ----------------------------<  90  4.3   |  27  |  9.20<H>    Ca    9.4      15 Aug 2019 06:50  Phos  4.9     08-15  Mg     2.3     08-15      PT/INR - ( 14 Aug 2019 22:33 )   PT: 19.6 sec;   INR: 1.71          PTT - ( 14 Aug 2019 22:33 )  PTT:32.6 sec              RADIOLOGY, EKG & ADDITIONAL TESTS: Reviewed.

## 2019-08-15 NOTE — PROGRESS NOTE ADULT - PROBLEM SELECTOR PLAN 2
Pt with ESRD on HD, last (full session) on 8/09/19. Unclear etiology of ESRD.   -renal following  -HD scheduled for today CT showed bilateral occlusive segmental and subsegmental PE with developing bilateral pulmonary infarct, likely 2/2 LUE AV fistula in setting of known thrombectomy. Echo without evidence of R heart strain. Echo with EF 55-60%, normal RV and LV function   -c/w eliquis 5mg BID (since 8/12) - maintenance dose

## 2019-08-16 ENCOUNTER — TRANSCRIPTION ENCOUNTER (OUTPATIENT)
Age: 49
End: 2019-08-16

## 2019-08-16 DIAGNOSIS — I26.99 OTHER PULMONARY EMBOLISM WITHOUT ACUTE COR PULMONALE: ICD-10-CM

## 2019-08-16 DIAGNOSIS — K20.9 ESOPHAGITIS, UNSPECIFIED: ICD-10-CM

## 2019-08-16 DIAGNOSIS — R50.81 FEVER PRESENTING WITH CONDITIONS CLASSIFIED ELSEWHERE: ICD-10-CM

## 2019-08-16 LAB
ANION GAP SERPL CALC-SCNC: 18 MMOL/L — HIGH (ref 5–17)
BUN SERPL-MCNC: 68 MG/DL — HIGH (ref 7–23)
CALCIUM SERPL-MCNC: 9.3 MG/DL — SIGNIFICANT CHANGE UP (ref 8.4–10.5)
CHLORIDE SERPL-SCNC: 93 MMOL/L — LOW (ref 96–108)
CO2 SERPL-SCNC: 25 MMOL/L — SIGNIFICANT CHANGE UP (ref 22–31)
CREAT SERPL-MCNC: 12.04 MG/DL — HIGH (ref 0.5–1.3)
GLUCOSE SERPL-MCNC: 81 MG/DL — SIGNIFICANT CHANGE UP (ref 70–99)
HCT VFR BLD CALC: 27 % — LOW (ref 39–50)
HGB BLD-MCNC: 8.7 G/DL — LOW (ref 13–17)
MAGNESIUM SERPL-MCNC: 2.4 MG/DL — SIGNIFICANT CHANGE UP (ref 1.6–2.6)
MCHC RBC-ENTMCNC: 28.2 PG — SIGNIFICANT CHANGE UP (ref 27–34)
MCHC RBC-ENTMCNC: 32.2 GM/DL — SIGNIFICANT CHANGE UP (ref 32–36)
MCV RBC AUTO: 87.7 FL — SIGNIFICANT CHANGE UP (ref 80–100)
NRBC # BLD: 0 /100 WBCS — SIGNIFICANT CHANGE UP (ref 0–0)
PHOSPHATE SERPL-MCNC: 6.2 MG/DL — HIGH (ref 2.5–4.5)
PLATELET # BLD AUTO: 300 K/UL — SIGNIFICANT CHANGE UP (ref 150–400)
POTASSIUM SERPL-MCNC: 4.7 MMOL/L — SIGNIFICANT CHANGE UP (ref 3.5–5.3)
POTASSIUM SERPL-SCNC: 4.7 MMOL/L — SIGNIFICANT CHANGE UP (ref 3.5–5.3)
RBC # BLD: 3.08 M/UL — LOW (ref 4.2–5.8)
RBC # FLD: 14.3 % — SIGNIFICANT CHANGE UP (ref 10.3–14.5)
SODIUM SERPL-SCNC: 136 MMOL/L — SIGNIFICANT CHANGE UP (ref 135–145)
WBC # BLD: 7.39 K/UL — SIGNIFICANT CHANGE UP (ref 3.8–10.5)
WBC # FLD AUTO: 7.39 K/UL — SIGNIFICANT CHANGE UP (ref 3.8–10.5)

## 2019-08-16 PROCEDURE — 99239 HOSP IP/OBS DSCHRG MGMT >30: CPT | Mod: GC

## 2019-08-16 PROCEDURE — 99232 SBSQ HOSP IP/OBS MODERATE 35: CPT | Mod: GC

## 2019-08-16 RX ORDER — APIXABAN 2.5 MG/1
1 TABLET, FILM COATED ORAL
Qty: 180 | Refills: 0
Start: 2019-08-16 | End: 2019-11-13

## 2019-08-16 RX ORDER — METHYLPREDNISOLONE 4 MG
0 TABLET ORAL
Qty: 0 | Refills: 0 | DISCHARGE

## 2019-08-16 RX ADMIN — PIPERACILLIN AND TAZOBACTAM 200 GRAM(S): 4; .5 INJECTION, POWDER, LYOPHILIZED, FOR SOLUTION INTRAVENOUS at 11:19

## 2019-08-16 RX ADMIN — Medication 650 MILLIGRAM(S): at 15:22

## 2019-08-16 RX ADMIN — APIXABAN 5 MILLIGRAM(S): 2.5 TABLET, FILM COATED ORAL at 17:07

## 2019-08-16 RX ADMIN — Medication 100 MILLIGRAM(S): at 11:24

## 2019-08-16 RX ADMIN — APIXABAN 5 MILLIGRAM(S): 2.5 TABLET, FILM COATED ORAL at 06:14

## 2019-08-16 RX ADMIN — Medication 650 MILLIGRAM(S): at 14:07

## 2019-08-16 RX ADMIN — PANTOPRAZOLE SODIUM 40 MILLIGRAM(S): 20 TABLET, DELAYED RELEASE ORAL at 06:14

## 2019-08-16 RX ADMIN — OXYCODONE AND ACETAMINOPHEN 1 TABLET(S): 5; 325 TABLET ORAL at 06:14

## 2019-08-16 RX ADMIN — OXYCODONE AND ACETAMINOPHEN 1 TABLET(S): 5; 325 TABLET ORAL at 08:13

## 2019-08-16 NOTE — PROGRESS NOTE ADULT - ASSESSMENT
47 yo M w PMHx of ESRD on HD T, Th, Sat via left sided AV fistula presenting complaining of shortness of breath, fevers, chills at dialysis. Found to have b/l PE with pulmonary infarcts and right heart strain.   Nephrology consult is placed in regards to ESRD on HD.    # ESRD on HD TTS via LUE AVF   - last HD completed on 8/14   - volume status and electrolytes noted, acceptable  - defer HD today  - anticipate HD on 8/17 as per schedule    # Pneumonia, likely  - uremic pericarditis less likely (regular HD, compliance, URR >65%)  - renal dosing of antibiotics  - vanco trough 28.5, hold on vanco    # Anemia  - monitor H/H  - transfusion as per primary team 47 yo M w PMHx of ESRD on HD T, Th, Sat via left sided AV fistula presenting complaining of shortness of breath, fevers, chills at dialysis. Found to have b/l PE with pulmonary infarcts and right heart strain.   Nephrology consult is placed in regards to ESRD on HD.    # ESRD on HD TTS via LUE AVF   - last HD completed on 8/14   - volume status and electrolytes noted, acceptable  - defer HD today  - anticipate HD on 8/17 as per schedule    # Pneumonia, likely  - uremic pericarditis less likely (regular HD, compliance, URR >65%)  - renal dosing of antibiotics  - vanco trough 28.5, hold on vanco  - chest PT, incentive spirometry  - get CXR    # Anemia  - monitor H/H  - transfusion as per primary team

## 2019-08-16 NOTE — DISCHARGE NOTE PROVIDER - HOSPITAL COURSE
Homeless 49 yo M PMH ESRD on HD T, Th, Sat via left sided AV fistula         presenting complaining of shortness of breath, fevers, chills and found to have  bilateral segmental PE (submassive) after open LUE fistula thrombectomy.         #Pulmonary Embolism- Pt still has some sob on exertion and pleuritic chest pain, but on room air satting well and stable for d/c. Continue eliquis 3 months        #fever- pt had low grade fever during admission. Had negative infectious workup. Never had leukocytosis. Thought to be 2/2 to PE        #ESRD on HD- pt did experience positive orthostatics, and given fluid bolus per nephro approval s/p HD         #Normocytic Anemia- downtrending during admission to [facility] (baseline 11). No signs of bleeding. Most likely 2/2 chronic disease/ESRD         Inpatient treatment course: 8/9 CT angio showed B/L PE. Started on heparin drip in ICU. ECHO w EF 55-60% w/ interventricular septal bounce, RV dysfunction with hypokinetic RV apex. Met criteria for severe sepsis (tachycardic, tachypnea, and suspected infection). BCx, UCx sent w suspicion for AV fistula infection given recent instrumentation to site on 8/6. HD done and started on Vanc. Placed on bipap and given Dilaudid for pleuritic pain. Transitioned to eliquis.8/13 Transferred to the medical floor. pt febrile 101.4F had full fever workup which came back negative for infection, including ultrasound at fistula site which showed a small hematoma and no signs of abscess. Antibiotics were stopped and pt d/c in stable condition.              New Meds: Eliquis 5mg BID for 3 months        Exam to be followed outpatient: Follow up w/ hematologist Dr. Kaminski for clotting + anemia  workup

## 2019-08-16 NOTE — DISCHARGE NOTE PROVIDER - NSDCCPCAREPLAN_GEN_ALL_CORE_FT
PRINCIPAL DISCHARGE DIAGNOSIS  Diagnosis: Pulmonary embolism  Assessment and Plan of Treatment: You were found to have a pulmonary embolism (clot in lung) after you had your AV fistula procedure w/ vascular surgery. You were started on a blood thinner called eliquis to stop the clot from getting bigger. The clot in the lung is what causes you to have pain when you take a deep breath, and can also cause you to have a low grade fever and be short of breath. Continue to take the eliquis as prescribed for 3 months and follow up with hematologist Dr. Kaminski to monitor the blood clot and anemia. If you develop worsening chest pain, shortness of breath, fever, chills, bleeding, or lightheadedness, make sure to return to the emergency room for evaluation.      SECONDARY DISCHARGE DIAGNOSES  Diagnosis: ESRD (end stage renal disease) on dialysis  Assessment and Plan of Treatment: You have end stage renal disease and go to dialysis on tuesdays, thursdays, and saturdays. Make sure to attend your dialysis session tomorrow. Follow up with your nephrologist at your dialysis center.  If you develop worsening chest pain, shortness of breath, lightheadedness, dizziness, or lots of volume overload, make sure to return to the emergency room for evaluation.

## 2019-08-16 NOTE — PROGRESS NOTE ADULT - SUBJECTIVE AND OBJECTIVE BOX
Patient seen and evaluated at bedside.  Still coughing, denies fever, chills, chest pain, sob.  Breathing comfortably on room air with Pox 95 %.  Last HD on .        Meds:    acetaminophen   Tablet .. 650 milliGRAM(s) Oral every 6 hours PRN  apixaban 5 milliGRAM(s) Oral every 12 hours  benzonatate 100 milliGRAM(s) Oral three times a day PRN  guaiFENesin    Syrup 100 milliGRAM(s) Oral every 6 hours PRN  pantoprazole    Tablet 40 milliGRAM(s) Oral before breakfast  piperacillin/tazobactam IVPB.. 2.25 Gram(s) IV Intermittent every 12 hours      T(C): 36.4 (19 @ 05:37), Max: 37 (08-15-19 @ 21:06)  HR: 83 (19 @ 05:37) (82 - 83)  BP: 123/68 (19 @ 05:37) (104/62 - 123/68)  RR: 18 (19 @ 05:37) (16 - 18)  SpO2: 95% (19 @ 05:37) (94% - 99%)    Input/Output      08-15-19 @ 07:01  -  19 @ 07:00  --------------------------------------------------------  IN: 1250 mL / OUT: 425 mL / NET: 825 mL            ROS:   Gen: no fever  Cardiovascular: no chest pain  Respiratory: + productive cough, no sob  Gastrointestinal: no nausea, no vomiting, no change in bowel habits  Neurologic: no headache  : no urinary symptoms        PHYSICAL EXAM:  GENERAL: NAD, alert  EYES: Bilateral conjunctiva and sclera normal   Oral cavity: Oral mucosa moist and pink  NECK: Neck supple, No JVD  CHEST/LUNG: decreased air entry, clear breath sounds bilateral, no rub   HEART: Regular rate and rhythm, no murmurs, no rub  ABDOMEN: Soft, Nontender, BS+nt, No flank tenderness  EXTREMITIES: no LE edema bilateral  Neurology: AAOx3, no focal neurological deficit  SKIN: chronic skin changes  ACCESS: LUE AVF with good thrill and bruit           LABS:                                         8.7    7.39  )-----------( 300      ( 16 Aug 2019 05:44 )             27.0       08-16    136  |  93<L>  |  68<H>  ----------------------------<  81  4.7   |  25  |  12.04<H>    Ca    9.3      16 Aug 2019 05:44  Phos  6.2       Mg     2.4             PT/INR - ( 14 Aug 2019 22:33 )   PT: 19.6 sec;   INR: 1.71          PTT - ( 14 Aug 2019 22:33 )  PTT:32.6 sec    Urinalysis Basic - ( 15 Aug 2019 22:48 )    Color: Yellow / Appearance: Clear / S.010 / pH: x  Gluc: x / Ketone: NEGATIVE  / Bili: Negative / Urobili: 1.0 E.U./dL   Blood: x / Protein: 100 mg/dL / Nitrite: NEGATIVE   Leuk Esterase: NEGATIVE / RBC: < 5 /HPF / WBC < 5 /HPF   Sq Epi: x / Non Sq Epi: 0-5 /HPF / Bacteria: Present /HPF                                                                             RADIOLOGY & ADDITIONAL STUDIES: Patient seen and evaluated at bedside.  Still coughing, denies fever, chills, still has some chest pain, has some sob.  dizziness has resolved  Breathing comfortably on room air with Pox 95 %.  Last HD on .        Meds:    acetaminophen   Tablet .. 650 milliGRAM(s) Oral every 6 hours PRN  apixaban 5 milliGRAM(s) Oral every 12 hours  benzonatate 100 milliGRAM(s) Oral three times a day PRN  guaiFENesin    Syrup 100 milliGRAM(s) Oral every 6 hours PRN  pantoprazole    Tablet 40 milliGRAM(s) Oral before breakfast  piperacillin/tazobactam IVPB.. 2.25 Gram(s) IV Intermittent every 12 hours      T(C): 36.4 (19 @ 05:37), Max: 37 (08-15-19 @ 21:06)  HR: 83 (19 @ 05:37) (82 - 83)  BP: 123/68 (19 @ 05:37) (104/62 - 123/68)  RR: 18 (19 @ 05:37) (16 - 18)  SpO2: 95% (19 @ 05:37) (94% - 99%)    Input/Output      08-15-19 @ 07:01  -  19 @ 07:00  --------------------------------------------------------  IN: 1250 mL / OUT: 425 mL / NET: 825 mL            ROS:   Gen: no fever  Cardiovascular: some chest pain  Respiratory: + productive cough, some sob  Gastrointestinal: no nausea, no vomiting, no change in bowel habits  Neurologic: no headache  : no urinary symptoms        PHYSICAL EXAM:  GENERAL: NAD, alert on RA  EYES: Bilateral conjunctiva and sclera normal   Oral cavity: Oral mucosa moist and pink  NECK: Neck supple, No JVD  CHEST/LUNG: decreased BS bases  HEART: Regular rate and rhythm, no murmurs, no rub  ABDOMEN: Soft, Nontender, BS+nt, No flank tenderness  EXTREMITIES: no LE edema bilateral  Neurology: AAOx3, no focal neurological deficit  SKIN: chronic skin changes  ACCESS: LUE AVF with good thrill and bruit           LABS:                                         8.7    7.39  )-----------( 300      ( 16 Aug 2019 05:44 )             27.0       08-16    136  |  93<L>  |  68<H>  ----------------------------<  81  4.7   |  25  |  12.04<H>    Ca    9.3      16 Aug 2019 05:44  Phos  6.2     -  Mg     2.4     08-16        PT/INR - ( 14 Aug 2019 22:33 )   PT: 19.6 sec;   INR: 1.71          PTT - ( 14 Aug 2019 22:33 )  PTT:32.6 sec    Urinalysis Basic - ( 15 Aug 2019 22:48 )    Color: Yellow / Appearance: Clear / S.010 / pH: x  Gluc: x / Ketone: NEGATIVE  / Bili: Negative / Urobili: 1.0 E.U./dL   Blood: x / Protein: 100 mg/dL / Nitrite: NEGATIVE   Leuk Esterase: NEGATIVE / RBC: < 5 /HPF / WBC < 5 /HPF   Sq Epi: x / Non Sq Epi: 0-5 /HPF / Bacteria: Present /HPF                                                                             RADIOLOGY & ADDITIONAL STUDIES:

## 2019-08-16 NOTE — DISCHARGE NOTE PROVIDER - NSDCFUADDAPPT_GEN_ALL_CORE_FT
Erika Kaminski)  Hematologist/Internal Medicine  [address] [place] [address]  Phone: [phone]  Fax: [phone]  Follow Up Time: call and make appointment to follow up 1-2 weeks w/ Dr. Kaminski for your blood clot Erika Kaminski)  Hematologist/Internal Medicine  [address] [place] [address]  Phone: [phone]  Fax: [phone]  Follow Up Time: call and make appointment to follow up 1-2 weeks w/ Dr. Kaminski for your blood clot and anemia

## 2019-08-16 NOTE — PROGRESS NOTE ADULT - PROBLEM SELECTOR PLAN 1
s/p LUE AVF thrombectomy; c/b pulmonary infarct; e/o R heart strain seen on CT but not TTE; cont. A/C w/ DOAC for at least 3 months; outpatient Heme f/u; I-STOP reviewed, I will write rx for Percocet PRN

## 2019-08-16 NOTE — DISCHARGE NOTE PROVIDER - CARE PROVIDER_API CALL
Erika Kaminski)  Internal Medicine  [address]  [place], [place] [id number]  Phone: [phone]  Fax: [phone]  Follow Up Time:

## 2019-08-16 NOTE — PROGRESS NOTE ADULT - SUBJECTIVE AND OBJECTIVE BOX
Patient is a 48y old  Male who presents with a chief complaint of Pulmonary Embolism (16 Aug 2019 15:08)      INTERVAL HPI/OVERNIGHT EVENTS:    Pt. seen and examined at 11:45AM  Fevers resolved  Pt. reports improved pleuritic CP (controlled w/ rx) and BLACK  Ambulated w/ PT w/out need for O2  Denies cough, dysuria, N/V/D, rash  +BM last night    Review of Systems: 12 point review of systems otherwise negative    MEDICATIONS  (STANDING):  apixaban 5 milliGRAM(s) Oral every 12 hours  pantoprazole    Tablet 40 milliGRAM(s) Oral before breakfast    MEDICATIONS  (PRN):  acetaminophen   Tablet .. 650 milliGRAM(s) Oral every 6 hours PRN Temp greater or equal to 38C (100.4F), Mild Pain (1 - 3)  benzonatate 100 milliGRAM(s) Oral three times a day PRN Cough -  1st line  guaiFENesin    Syrup 100 milliGRAM(s) Oral every 6 hours PRN Cough - 2nd line      Allergies    No Known Allergies    Intolerances          Vital Signs Last 24 Hrs  T(C): 36.9 (16 Aug 2019 13:52), Max: 37 (15 Aug 2019 21:06)  T(F): 98.5 (16 Aug 2019 13:52), Max: 98.6 (15 Aug 2019 21:06)  HR: 79 (16 Aug 2019 13:52) (79 - 93)  BP: 115/67 (16 Aug 2019 13:52) (104/62 - 123/68)  BP(mean): --  RR: 18 (16 Aug 2019 13:52) (16 - 18)  SpO2: 95% (16 Aug 2019 13:52) (94% - 96%)  CAPILLARY BLOOD GLUCOSE          08-15 @ 07:01  -  08-16 @ 07:00  --------------------------------------------------------  IN: 1250 mL / OUT: 425 mL / NET: 825 mL        Physical Exam:  (at 11:45AM)  Daily     Daily   General:  non-toxic and comfortable-appearing in NAD  HEENT:  MMM  CV:  RRR, no JVD  Lungs:  CTA B/L, normal WOB on RA  Abdomen:  soft NT ND  Extremities:  LUE AVF w/out signs of infection  Skin:  WWP  Neuro:  AAOx3, non-focal  LABS:                        8.7    7.39  )-----------( 300      ( 16 Aug 2019 05:44 )             27.0     08-16    136  |  93<L>  |  68<H>  ----------------------------<  81  4.7   |  25  |  12.04<H>    Ca    9.3      16 Aug 2019 05:44  Phos  6.2     08-16  Mg     2.4     08-16      PT/INR - ( 14 Aug 2019 22:33 )   PT: 19.6 sec;   INR: 1.71          PTT - ( 14 Aug 2019 22:33 )  PTT:32.6 sec  Urinalysis Basic - ( 15 Aug 2019 22:48 )    Color: Yellow / Appearance: Clear / S.010 / pH: x  Gluc: x / Ketone: NEGATIVE  / Bili: Negative / Urobili: 1.0 E.U./dL   Blood: x / Protein: 100 mg/dL / Nitrite: NEGATIVE   Leuk Esterase: NEGATIVE / RBC: < 5 /HPF / WBC < 5 /HPF   Sq Epi: x / Non Sq Epi: 0-5 /HPF / Bacteria: Present /HPF          RADIOLOGY & ADDITIONAL TESTS:    ---------------------------------------------------------------------------  I personally reviewed: [  ]EKG   [  ]CXR    [  ] CT    [  ]Other  ---------------------------------------------------------------------------  PLEASE CHECK WHEN PRESENT:     [  ]Heart Failure     [  ] Acute     [  ] Acute on Chronic     [  ] Chronic  -------------------------------------------------------------------     [  ]Diastolic [HFpEF]     [  ]Systolic [HFrEF]     [  ]Combined [HFpEF & HFrEF]     [  ]Other:  -------------------------------------------------------------------  [  ]JIM     [  ]ATN     [  ]Reneal Medullary Necrosis     [  ]Renal Cortical Necrosis     [  ]Other Pathological Lesions:    [  ]CKD 1  [  ]CKD 2  [  ]CKD 3  [  ]CKD 4  [  ]CKD 5  [  ]Other  -------------------------------------------------------------------  [  ]Other/Unspecified:    --------------------------------------------------------------------    Abdominal Nutritional Status  [  ]Malnutrition: See Nutrition Note  [  ]Cachexia  [  ]Other:   [  ]Supplement Ordered:  [  ]Morbid Obesity (BMI >=40]

## 2019-08-17 ENCOUNTER — EMERGENCY (EMERGENCY)
Facility: HOSPITAL | Age: 49
LOS: 1 days | Discharge: HOME | End: 2019-08-17
Attending: EMERGENCY MEDICINE | Admitting: EMERGENCY MEDICINE
Payer: COMMERCIAL

## 2019-08-17 ENCOUNTER — TRANSCRIPTION ENCOUNTER (OUTPATIENT)
Age: 49
End: 2019-08-17

## 2019-08-17 VITALS
RESPIRATION RATE: 20 BRPM | HEIGHT: 69 IN | DIASTOLIC BLOOD PRESSURE: 64 MMHG | OXYGEN SATURATION: 98 % | HEART RATE: 92 BPM | SYSTOLIC BLOOD PRESSURE: 104 MMHG | TEMPERATURE: 98 F | WEIGHT: 189.6 LBS

## 2019-08-17 VITALS
OXYGEN SATURATION: 96 % | HEART RATE: 84 BPM | SYSTOLIC BLOOD PRESSURE: 122 MMHG | DIASTOLIC BLOOD PRESSURE: 74 MMHG | RESPIRATION RATE: 16 BRPM | TEMPERATURE: 99 F

## 2019-08-17 DIAGNOSIS — I77.0 ARTERIOVENOUS FISTULA, ACQUIRED: Chronic | ICD-10-CM

## 2019-08-17 LAB
ALBUMIN SERPL ELPH-MCNC: 3.6 G/DL — SIGNIFICANT CHANGE UP (ref 3.3–5)
ALP SERPL-CCNC: 61 U/L — SIGNIFICANT CHANGE UP (ref 40–120)
ALT FLD-CCNC: 19 U/L — SIGNIFICANT CHANGE UP (ref 10–45)
ANION GAP SERPL CALC-SCNC: 16 MMOL/L — SIGNIFICANT CHANGE UP (ref 5–17)
ANION GAP SERPL CALC-SCNC: 23 MMOL/L — HIGH (ref 5–17)
AST SERPL-CCNC: 21 U/L — SIGNIFICANT CHANGE UP (ref 10–40)
BASOPHILS # BLD AUTO: 0 K/UL — SIGNIFICANT CHANGE UP (ref 0–0.2)
BASOPHILS NFR BLD AUTO: 0 % — SIGNIFICANT CHANGE UP (ref 0–2)
BILIRUB SERPL-MCNC: 0.3 MG/DL — SIGNIFICANT CHANGE UP (ref 0.2–1.2)
BUN SERPL-MCNC: 45 MG/DL — HIGH (ref 7–23)
BUN SERPL-MCNC: 80 MG/DL — HIGH (ref 7–23)
CALCIUM SERPL-MCNC: 9.1 MG/DL — SIGNIFICANT CHANGE UP (ref 8.4–10.5)
CALCIUM SERPL-MCNC: 9.4 MG/DL — SIGNIFICANT CHANGE UP (ref 8.4–10.5)
CHLORIDE SERPL-SCNC: 91 MMOL/L — LOW (ref 96–108)
CHLORIDE SERPL-SCNC: 95 MMOL/L — LOW (ref 96–108)
CO2 SERPL-SCNC: 22 MMOL/L — SIGNIFICANT CHANGE UP (ref 22–31)
CO2 SERPL-SCNC: 28 MMOL/L — SIGNIFICANT CHANGE UP (ref 22–31)
CREAT SERPL-MCNC: 15.29 MG/DL — HIGH (ref 0.5–1.3)
CREAT SERPL-MCNC: 9.82 MG/DL — HIGH (ref 0.5–1.3)
CULTURE RESULTS: SIGNIFICANT CHANGE UP
DACRYOCYTES BLD QL SMEAR: SLIGHT — SIGNIFICANT CHANGE UP
EOSINOPHIL # BLD AUTO: 0.08 K/UL — SIGNIFICANT CHANGE UP (ref 0–0.5)
EOSINOPHIL NFR BLD AUTO: 0.8 % — SIGNIFICANT CHANGE UP (ref 0–6)
GIANT PLATELETS BLD QL SMEAR: PRESENT — SIGNIFICANT CHANGE UP
GLUCOSE SERPL-MCNC: 144 MG/DL — HIGH (ref 70–99)
GLUCOSE SERPL-MCNC: 146 MG/DL — HIGH (ref 70–99)
HCT VFR BLD CALC: 28.3 % — LOW (ref 39–50)
HCT VFR BLD CALC: 32.2 % — LOW (ref 39–50)
HGB BLD-MCNC: 10.4 G/DL — LOW (ref 13–17)
HGB BLD-MCNC: 9.4 G/DL — LOW (ref 13–17)
LYMPHOCYTES # BLD AUTO: 0.33 K/UL — LOW (ref 1–3.3)
LYMPHOCYTES # BLD AUTO: 3.5 % — LOW (ref 13–44)
MANUAL SMEAR VERIFICATION: SIGNIFICANT CHANGE UP
MCHC RBC-ENTMCNC: 28.5 PG — SIGNIFICANT CHANGE UP (ref 27–34)
MCHC RBC-ENTMCNC: 28.9 PG — SIGNIFICANT CHANGE UP (ref 27–34)
MCHC RBC-ENTMCNC: 32.3 GM/DL — SIGNIFICANT CHANGE UP (ref 32–36)
MCHC RBC-ENTMCNC: 33.2 GM/DL — SIGNIFICANT CHANGE UP (ref 32–36)
MCV RBC AUTO: 87.1 FL — SIGNIFICANT CHANGE UP (ref 80–100)
MCV RBC AUTO: 88.2 FL — SIGNIFICANT CHANGE UP (ref 80–100)
MONOCYTES # BLD AUTO: 0.33 K/UL — SIGNIFICANT CHANGE UP (ref 0–0.9)
MONOCYTES NFR BLD AUTO: 3.5 % — SIGNIFICANT CHANGE UP (ref 2–14)
MYELOCYTES NFR BLD: 0.9 % — HIGH (ref 0–0)
NEUTROPHILS # BLD AUTO: 8.74 K/UL — HIGH (ref 1.8–7.4)
NEUTROPHILS NFR BLD AUTO: 90.4 % — HIGH (ref 43–77)
NEUTS BAND # BLD: 0.9 % — SIGNIFICANT CHANGE UP (ref 0–8)
NRBC # BLD: 0 /100 WBCS — SIGNIFICANT CHANGE UP (ref 0–0)
PLAT MORPH BLD: ABNORMAL
PLATELET # BLD AUTO: 365 K/UL — SIGNIFICANT CHANGE UP (ref 150–400)
PLATELET # BLD AUTO: 420 K/UL — HIGH (ref 150–400)
POIKILOCYTOSIS BLD QL AUTO: SLIGHT — SIGNIFICANT CHANGE UP
POTASSIUM SERPL-MCNC: 4.4 MMOL/L — SIGNIFICANT CHANGE UP (ref 3.5–5.3)
POTASSIUM SERPL-MCNC: 4.7 MMOL/L — SIGNIFICANT CHANGE UP (ref 3.5–5.3)
POTASSIUM SERPL-SCNC: 4.4 MMOL/L — SIGNIFICANT CHANGE UP (ref 3.5–5.3)
POTASSIUM SERPL-SCNC: 4.7 MMOL/L — SIGNIFICANT CHANGE UP (ref 3.5–5.3)
PROT SERPL-MCNC: 9.9 G/DL — HIGH (ref 6–8.3)
RBC # BLD: 3.25 M/UL — LOW (ref 4.2–5.8)
RBC # BLD: 3.65 M/UL — LOW (ref 4.2–5.8)
RBC # FLD: 13.9 % — SIGNIFICANT CHANGE UP (ref 10.3–14.5)
RBC # FLD: 14.1 % — SIGNIFICANT CHANGE UP (ref 10.3–14.5)
RBC BLD AUTO: ABNORMAL
SODIUM SERPL-SCNC: 135 MMOL/L — SIGNIFICANT CHANGE UP (ref 135–145)
SODIUM SERPL-SCNC: 140 MMOL/L — SIGNIFICANT CHANGE UP (ref 135–145)
SPECIMEN SOURCE: SIGNIFICANT CHANGE UP
TROPONIN T SERPL-MCNC: 0.06 NG/ML — CRITICAL HIGH (ref 0–0.01)
WBC # BLD: 8.2 K/UL — SIGNIFICANT CHANGE UP (ref 3.8–10.5)
WBC # BLD: 9.57 K/UL — SIGNIFICANT CHANGE UP (ref 3.8–10.5)
WBC # FLD AUTO: 8.2 K/UL — SIGNIFICANT CHANGE UP (ref 3.8–10.5)
WBC # FLD AUTO: 9.57 K/UL — SIGNIFICANT CHANGE UP (ref 3.8–10.5)

## 2019-08-17 PROCEDURE — 87581 M.PNEUMON DNA AMP PROBE: CPT

## 2019-08-17 PROCEDURE — 90935 HEMODIALYSIS ONE EVALUATION: CPT | Mod: GC

## 2019-08-17 PROCEDURE — 83605 ASSAY OF LACTIC ACID: CPT

## 2019-08-17 PROCEDURE — 80053 COMPREHEN METABOLIC PANEL: CPT

## 2019-08-17 PROCEDURE — 83880 ASSAY OF NATRIURETIC PEPTIDE: CPT

## 2019-08-17 PROCEDURE — 71045 X-RAY EXAM CHEST 1 VIEW: CPT

## 2019-08-17 PROCEDURE — 82553 CREATINE MB FRACTION: CPT

## 2019-08-17 PROCEDURE — 94660 CPAP INITIATION&MGMT: CPT

## 2019-08-17 PROCEDURE — 87040 BLOOD CULTURE FOR BACTERIA: CPT

## 2019-08-17 PROCEDURE — 97530 THERAPEUTIC ACTIVITIES: CPT

## 2019-08-17 PROCEDURE — 99285 EMERGENCY DEPT VISIT HI MDM: CPT | Mod: 25

## 2019-08-17 PROCEDURE — 85730 THROMBOPLASTIN TIME PARTIAL: CPT

## 2019-08-17 PROCEDURE — 83735 ASSAY OF MAGNESIUM: CPT

## 2019-08-17 PROCEDURE — 87633 RESP VIRUS 12-25 TARGETS: CPT

## 2019-08-17 PROCEDURE — 81001 URINALYSIS AUTO W/SCOPE: CPT

## 2019-08-17 PROCEDURE — 99284 EMERGENCY DEPT VISIT MOD MDM: CPT

## 2019-08-17 PROCEDURE — 87641 MR-STAPH DNA AMP PROBE: CPT

## 2019-08-17 PROCEDURE — 85025 COMPLETE CBC W/AUTO DIFF WBC: CPT

## 2019-08-17 PROCEDURE — 82803 BLOOD GASES ANY COMBINATION: CPT

## 2019-08-17 PROCEDURE — 85027 COMPLETE CBC AUTOMATED: CPT

## 2019-08-17 PROCEDURE — 93005 ELECTROCARDIOGRAM TRACING: CPT

## 2019-08-17 PROCEDURE — 97162 PT EVAL MOD COMPLEX 30 MIN: CPT

## 2019-08-17 PROCEDURE — 71275 CT ANGIOGRAPHY CHEST: CPT

## 2019-08-17 PROCEDURE — 97116 GAIT TRAINING THERAPY: CPT

## 2019-08-17 PROCEDURE — 90935 HEMODIALYSIS ONE EVALUATION: CPT

## 2019-08-17 PROCEDURE — 82550 ASSAY OF CK (CPK): CPT

## 2019-08-17 PROCEDURE — 93010 ELECTROCARDIOGRAM REPORT: CPT

## 2019-08-17 PROCEDURE — 93970 EXTREMITY STUDY: CPT

## 2019-08-17 PROCEDURE — 87486 CHLMYD PNEUM DNA AMP PROBE: CPT

## 2019-08-17 PROCEDURE — 96374 THER/PROPH/DIAG INJ IV PUSH: CPT | Mod: XU

## 2019-08-17 PROCEDURE — 80048 BASIC METABOLIC PNL TOTAL CA: CPT

## 2019-08-17 PROCEDURE — 87070 CULTURE OTHR SPECIMN AEROBIC: CPT

## 2019-08-17 PROCEDURE — 85610 PROTHROMBIN TIME: CPT

## 2019-08-17 PROCEDURE — 93990 DOPPLER FLOW TESTING: CPT

## 2019-08-17 PROCEDURE — 36415 COLL VENOUS BLD VENIPUNCTURE: CPT

## 2019-08-17 PROCEDURE — 99233 SBSQ HOSP IP/OBS HIGH 50: CPT | Mod: GC

## 2019-08-17 PROCEDURE — 80202 ASSAY OF VANCOMYCIN: CPT

## 2019-08-17 PROCEDURE — 93306 TTE W/DOPPLER COMPLETE: CPT

## 2019-08-17 PROCEDURE — 87798 DETECT AGENT NOS DNA AMP: CPT

## 2019-08-17 PROCEDURE — 84520 ASSAY OF UREA NITROGEN: CPT

## 2019-08-17 PROCEDURE — 84100 ASSAY OF PHOSPHORUS: CPT

## 2019-08-17 PROCEDURE — 84484 ASSAY OF TROPONIN QUANT: CPT

## 2019-08-17 RX ADMIN — APIXABAN 5 MILLIGRAM(S): 2.5 TABLET, FILM COATED ORAL at 06:08

## 2019-08-17 RX ADMIN — PANTOPRAZOLE SODIUM 40 MILLIGRAM(S): 20 TABLET, DELAYED RELEASE ORAL at 06:08

## 2019-08-17 RX ADMIN — Medication 650 MILLIGRAM(S): at 06:12

## 2019-08-17 RX ADMIN — APIXABAN 5 MILLIGRAM(S): 2.5 TABLET, FILM COATED ORAL at 17:53

## 2019-08-17 RX ADMIN — Medication 650 MILLIGRAM(S): at 06:42

## 2019-08-17 NOTE — PROGRESS NOTE ADULT - SUBJECTIVE AND OBJECTIVE BOX
Patient was seen and evaluated on dialysis.   HR: 91 (08-17-19 @ 09:15)  BP: 126/72 (08-17-19 @ 09:15)  Wt(kg): -- 86.0       08-17    140  |  95<L>  |  80<H>  ----------------------------<  146<H>  4.4   |  22  |  15.29<H>    Ca    9.1      17 Aug 2019 09:43  Phos  6.2     08-16  Mg     2.4     08-16      Continue dialysis:   Dialyzer:   180    QB: 400  K bath: 2  Goal UF:   2.0    L   over     210          min  Patient is tolerating the procedure well.   Continue full treatment as prescribed.

## 2019-08-17 NOTE — PROGRESS NOTE ADULT - PROBLEM SELECTOR PLAN 1
8/14- febrile 101.4F o/n. Pt has been having low grade fevers and presumed to be from current PE. Fever seems to be uptrending. Did fever workup. No leukocytosis, tachycardia, or elevated lactate. DDX: 2/2 to PE vs bacteremia vs HAP vs UTI vs fistula abcess. Currently afebrile  -no source of infection found - urine, or blood  -cxr unchanged since previous, and no leukocytosis  - fistula ultrasound/doppler showed small hematoma w/o signs of abcess  -fever resolved, pt to be discharged today for outpt management of PE

## 2019-08-17 NOTE — PROGRESS NOTE ADULT - ASSESSMENT
47 yo M w PMHx of ESRD on HD T, Th, Sat via left sided AV fistula presenting complaining of shortness of breath, fevers, chills at dialysis. Found to have b/l PE with pulmonary infarcts and right heart strain.   AVF US: Small complex fluid collection anterior to the AV fistula, probable hematoma, less likely abscess. If there is continued clinical suspicion for infection, recommend follow-up ultrasound to evaluate for resolution.  Nephrology consult is placed in regards to ESRD on HD.    # ESRD on HD TTS via LUE AVF   - last HD completed on 8/14   - volume status noted  - BMP before HD, will follow  - anticipate HD today as per schedule  - AVF US noted as above: hematoma vs abscess (less likely)    # Pneumonia, likely  - uremic pericarditis less likely (regular HD, compliance, URR >65%)  - renal dosing of antibiotics  - chest PT, incentive spirometry    # Anemia  - CBC before HD, will follow  - transfusion as per primary team

## 2019-08-17 NOTE — PROGRESS NOTE ADULT - ATTENDING COMMENTS
seen on HD   tolerating rx   refusing 4 hour rx to increase clearance -- will check clearance by URR  may  do another rx tomorrow- if agrees
seen on HD  tolerating rx   cont as above - UF as tolerates
seen on HD with Dr Zarate, agree with above  tolerating rx with VSS  cont as above  -- goal UF 2 L
HD today for UF and clearance
seen on HD  agree with above- tolerating rx well  cont as above
HD today for clearance and UF  sono noted LUE-- likely post op changes-- no sign hematoma or abscess-- cont vascular f/u postop
volume ,lytes ok  no sign pericarditis  next HD tomorrow
volume, lytes appear good -- s/p IVF boluses yesterday for orthostasis- sx resolved  plan next HD tomorrow which is his regular day (as outpt)
HD today for clearance and UF  doubt uremic perciarditis in pt who receives regular dialysis (and BUN not suggestive) but will increase clearance goal today  and check clearance with rx
no HD today   ok to give another IVF bolus as orthostatic still and no sign overload
Dispo: medically-clear for d/c   Pt. declines shelter placement
Dispo: medically-clear for d/c   Pt. declines shelter placement
Pt. seen and examined by me earlier today; I have read Dr. Ordonez's note, I agree w/ his findings and plan of care as documented; cont. DOAC, transition to PO pain rx, wean off O2 as tolerated, cont. HD per Renal, PT as tolerated; fever likely non-infectious d/t VTE, but will f/u cultures, monitor off abx
Patient seen and examined with house-staff during bedside rounds  Resident note read, including vitals, physical findings, laboratory data, and radiological reports.   Revisions included below.  Case discussed with House staff  Direct personal management at bedside  and extensive interpretation of data. Decision making of high complexity.
Pt. seen and examined by me earlier today; I have read Dr. Ordonez's note, I agree w/ his findings and plan of care as documented; cont. abx, f/u cultures, AVF U/S

## 2019-08-17 NOTE — PROGRESS NOTE ADULT - PROBLEM SELECTOR PLAN 4
Met SIRS criteria by HR, RR and Temp at admission. Pt w/ low grade temp of 100.7F oral, 99.7 after tylenol     - BCx NGTD since (08/09/2019)  - hold abx at this time   - currently resolved

## 2019-08-17 NOTE — ED ADULT NURSE NOTE - OBJECTIVE STATEMENT
Patient AOX4 c/o of dizziness/SOB/midsternal non radiating CP/generalized weakness s/p dialysis treatment today s/p discharge from hospital admission. Patient reports hx of blood clot "in my lung"---speaking in full, complete sentences. In NAD. Denies fevers/chills/decrease PO intake. No neuro deficits noted---speaking in full, complete sentences.

## 2019-08-17 NOTE — PROGRESS NOTE ADULT - SUBJECTIVE AND OBJECTIVE BOX
**NOTE FOR 8/16/19                OVERNIGHT EVENTS: NIKHIL    SUBJECTIVE / INTERVAL HPI: Pt w/ some pleuritic chest pain this am, but on room air + slight sob on exertion. Cough is improved. Denies fever, rigors, abdominal pain, pain at his AV fistula, lightheadedness or dizziness. Pt says he is using his incentive spirometry     T(F): 98.5 (08-15-19 @ 06:11)  HR: 87 (08-15-19 @ 06:11)  BP: 126/69 (08-15-19 @ 06:11)  RR: 18 (08-15-19 @ 06:11)  SpO2: 94% (08-15-19 @ 06:11)    PHYSICAL EXAM:    General: Pt talking comfortably on room air, no accessory muscle use, no respiratory distress   HEENT: NC/AT; PERRL, anicteric sclera; MMM  Neck: supple, no JVD  Cardiovascular: +S1/S2; RRR  Respiratory: CTA in upper fields, no wheezing or crackles. Some crackles b/l in lower lung fields  Gastrointestinal: soft, NT/ND; +BSx4  Extremities: WWP; no leg edema, clubbing or cyanosis, Has AV fistula in left arm-no signs of erythema, swelling, or infection  Vascular: 2+ radial, DP/PT pulses B/L, no leg edema  Neurological: AAOx3; no focal deficits       .  LABS:                         9.4    8.44  )-----------( 264      ( 15 Aug 2019 06:50 )             29.3     08-15    136  |  94<L>  |  49<H>  ----------------------------<  90  4.3   |  27  |  9.20<H>    Ca    9.4      15 Aug 2019 06:50  Phos  4.9     08-15  Mg     2.3     08-15      PT/INR - ( 14 Aug 2019 22:33 )   PT: 19.6 sec;   INR: 1.71          PTT - ( 14 Aug 2019 22:33 )  PTT:32.6 sec              RADIOLOGY, EKG & ADDITIONAL TESTS: Reviewed.

## 2019-08-17 NOTE — PROGRESS NOTE ADULT - SUBJECTIVE AND OBJECTIVE BOX
Patient seen and evaluated at bedside.  Cough, sob improving, but not resolved.  Breathing comfortably on room air with Pox 95 %.  Last HD on .          Meds:    acetaminophen   Tablet .. 650 milliGRAM(s) Oral every 6 hours PRN  apixaban 5 milliGRAM(s) Oral every 12 hours  benzonatate 100 milliGRAM(s) Oral three times a day PRN  guaiFENesin    Syrup 100 milliGRAM(s) Oral every 6 hours PRN  pantoprazole    Tablet 40 milliGRAM(s) Oral before breakfast      T(C): 37.1 (19 @ 09:15), Max: 37.1 (19 @ 05:30)  HR: 91 (19 @ 09:15) (74 - 91)  BP: 126/72 (19 @ 09:15) (104/61 - 127/72)  RR: 16 (19 @ 09:15) (16 - 18)  SpO2: 94% (19 @ 09:15) (94% - 96%)    Input/Output      19 @ 07:01  -  19 @ 07:00  --------------------------------------------------------  IN: 0 mL / OUT: 350 mL / NET: -350 mL          ROS:   Gen: no fever  Cardiovascular: no chest pain  Respiratory: + productive cough getting better, some sob  Gastrointestinal: no nausea, no vomiting, no change in bowel habits  Neurologic: no headache  : no urinary symptoms        PHYSICAL EXAM:  GENERAL: NAD, alert on RA  EYES: Bilateral conjunctiva and sclera normal   Oral cavity: Oral mucosa moist and pink  NECK: Neck supple, No JVD  CHEST/LUNG: decreased BS bases  HEART: Regular rate and rhythm, no murmurs, no rub  ABDOMEN: Soft, Nontender, BS+nt, No flank tenderness  EXTREMITIES: no LE edema bilateral  Neurology: AAOx3, no focal neurological deficit  SKIN: chronic skin changes  ACCESS: LUE AVF with good thrill and bruit           LABS:                                             9.4    8.20  )-----------( 365      ( 17 Aug 2019 09:43 )             28.3       08-17    140  |  95<L>  |  80<H>  ----------------------------<  146<H>  4.4   |  22  |  15.29<H>    Ca    9.1      17 Aug 2019 09:43  Phos  6.2     08-16  Mg     2.4     08-16            Urinalysis Basic - ( 15 Aug 2019 22:48 )    Color: Yellow / Appearance: Clear / S.010 / pH: x  Gluc: x / Ketone: NEGATIVE  / Bili: Negative / Urobili: 1.0 E.U./dL   Blood: x / Protein: 100 mg/dL / Nitrite: NEGATIVE   Leuk Esterase: NEGATIVE / RBC: < 5 /HPF / WBC < 5 /HPF   Sq Epi: x / Non Sq Epi: 0-5 /HPF / Bacteria: Present /HPF                                                                                   RADIOLOGY & ADDITIONAL STUDIES:      < from: US Duplex Hemodialysis Access (08.15.19 @ 13:43) >  INTERPRETATION:  US HEMODIALYSIS ACCESS DUPLEX dated 8/15/2019 1:43 PM    INDICATION: fever and chills r/o fistula abscess    TECHNIQUE: Targeted ultrasound evaluation of the left arm region of the   AV fistula was performed using grayscale and color flow Doppler.    PRIOR STUDIES: None.    FINDINGS:    Small avascular complex fluid collection anterior to the AV fistula,   measuring 1.5 x 0.7 cm. Noperipheral vascular flow is present. Findings   likely represent evolving hematoma.    No evidence of pseudoaneurysm. Patent AV fistula.      IMPRESSION:    Small complex fluid collection anterior to the AV fistula, probable   hematoma, less likely abscess. If there is continued clinical suspicion   for infection, recommend follow-up ultrasound to evaluate for resolution.    < end of copied text > Patient seen and evaluated at bedside.  Cough, sob improving, but not resolved.  Breathing comfortably on room air with Pox 95 %.  Last HD on .          Meds:    acetaminophen   Tablet .. 650 milliGRAM(s) Oral every 6 hours PRN  apixaban 5 milliGRAM(s) Oral every 12 hours  benzonatate 100 milliGRAM(s) Oral three times a day PRN  guaiFENesin    Syrup 100 milliGRAM(s) Oral every 6 hours PRN  pantoprazole    Tablet 40 milliGRAM(s) Oral before breakfast      T(C): 37.1 (19 @ 09:15), Max: 37.1 (19 @ 05:30)  HR: 91 (19 @ 09:15) (74 - 91)  BP: 126/72 (19 @ 09:15) (104/61 - 127/72)  RR: 16 (19 @ 09:15) (16 - 18)  SpO2: 94% (19 @ 09:15) (94% - 96%)    Input/Output      19 @ 07:01  -  19 @ 07:00  --------------------------------------------------------  IN: 0 mL / OUT: 350 mL / NET: -350 mL          ROS:   Gen: no fever  Cardiovascular: no chest pain  Respiratory: + productive cough getting better, some sob  Gastrointestinal: no nausea, no vomiting, no change in bowel habits  Neurologic: no headache  : no urinary symptoms        PHYSICAL EXAM:  GENERAL: NAD, alert on RA  EYES: Bilateral conjunctiva and sclera normal   Oral cavity: Oral mucosa moist and pink  NECK: Neck supple, No JVD  CHEST/LUNG: decreased BS bases  HEART: Regular rate and rhythm, no murmurs, no rub  ABDOMEN: Soft, Nontender, BS+nt, No flank tenderness  EXTREMITIES: no LE edema bilateral  Neurology: AAOx3, no focal neurological deficit  SKIN: chronic skin changes  ACCESS: LUE AVF with good thrill and bruit, no hemtoma, swelling,, surgical incision present with sutures           LABS:                                             9.4    8.20  )-----------( 365      ( 17 Aug 2019 09:43 )             28.3       08-17    140  |  95<L>  |  80<H>  ----------------------------<  146<H>  4.4   |  22  |  15.29<H>    Ca    9.1      17 Aug 2019 09:43  Phos  6.2     08-16  Mg     2.4     08-16            Urinalysis Basic - ( 15 Aug 2019 22:48 )    Color: Yellow / Appearance: Clear / S.010 / pH: x  Gluc: x / Ketone: NEGATIVE  / Bili: Negative / Urobili: 1.0 E.U./dL   Blood: x / Protein: 100 mg/dL / Nitrite: NEGATIVE   Leuk Esterase: NEGATIVE / RBC: < 5 /HPF / WBC < 5 /HPF   Sq Epi: x / Non Sq Epi: 0-5 /HPF / Bacteria: Present /HPF                                                                                   RADIOLOGY & ADDITIONAL STUDIES:      < from: US Duplex Hemodialysis Access (08.15.19 @ 13:43) >  INTERPRETATION:  US HEMODIALYSIS ACCESS DUPLEX dated 8/15/2019 1:43 PM    INDICATION: fever and chills r/o fistula abscess    TECHNIQUE: Targeted ultrasound evaluation of the left arm region of the   AV fistula was performed using grayscale and color flow Doppler.    PRIOR STUDIES: None.    FINDINGS:    Small avascular complex fluid collection anterior to the AV fistula,   measuring 1.5 x 0.7 cm. Noperipheral vascular flow is present. Findings   likely represent evolving hematoma.    No evidence of pseudoaneurysm. Patent AV fistula.      IMPRESSION:    Small complex fluid collection anterior to the AV fistula, probable   hematoma, less likely abscess. If there is continued clinical suspicion   for infection, recommend follow-up ultrasound to evaluate for resolution.    < end of copied text >

## 2019-08-17 NOTE — PROGRESS NOTE ADULT - PROBLEM SELECTOR PLAN 5
-pt had pleuritic chest pain  -Cardiology not concerned for pericarditis + signed off  -most likely from PE  - currently resolved

## 2019-08-17 NOTE — PROGRESS NOTE ADULT - SUBJECTIVE AND OBJECTIVE BOX
MEDICINE CONSULT SERVICE PROGRESS NOTE    SUBJECTIVE / INTERVAL HPI: Patient seen and examined at bedside. Feels well without any respiratory complaints.     VITAL SIGNS:  Vital Signs Last 24 Hrs  T(C): 36.7 (17 Aug 2019 12:45), Max: 37.1 (17 Aug 2019 05:30)  T(F): 98.1 (17 Aug 2019 12:45), Max: 98.8 (17 Aug 2019 09:15)  HR: 68 (17 Aug 2019 12:45) (68 - 91)  BP: 117/66 (17 Aug 2019 12:45) (104/61 - 127/72)  BP(mean): --  RR: 16 (17 Aug 2019 12:45) (16 - 18)  SpO2: 96% (17 Aug 2019 12:45) (94% - 96%)    PHYSICAL EXAM:    General: WDWN  HEENT: NC/AT; PERRL, anicteric sclera; MMM; mild bilateral conjunctival injection  Neck: supple  Cardiovascular: +S1/S2, RRR  Respiratory: CTA B/L; no W/R/R  Gastrointestinal: soft, NT/ND; +BSx4  Extremities: WWP; non-pitting edema bilaterally; No clubbing or cyanosis  Vascular: 2+ radial, DP/PT pulses unable to palpate due to chronic skin changes, but warm to touch.   Neurological: AAOx3; no focal deficits    MEDICATIONS:  MEDICATIONS  (STANDING):  apixaban 5 milliGRAM(s) Oral every 12 hours  pantoprazole    Tablet 40 milliGRAM(s) Oral before breakfast    MEDICATIONS  (PRN):  acetaminophen   Tablet .. 650 milliGRAM(s) Oral every 6 hours PRN Temp greater or equal to 38C (100.4F), Mild Pain (1 - 3)  benzonatate 100 milliGRAM(s) Oral three times a day PRN Cough -  1st line  guaiFENesin    Syrup 100 milliGRAM(s) Oral every 6 hours PRN Cough - 2nd line      ALLERGIES:  Allergies    No Known Allergies    Intolerances        LABS:                        9.4    8.20  )-----------( 365      ( 17 Aug 2019 09:43 )             28.3     08-17    140  |  95<L>  |  80<H>  ----------------------------<  146<H>  4.4   |  22  |  15.29<H>    Ca    9.1      17 Aug 2019 09:43  Phos  6.2     08-16  Mg     2.4     08-16        Urinalysis Basic - ( 15 Aug 2019 22:48 )    Color: Yellow / Appearance: Clear / S.010 / pH: x  Gluc: x / Ketone: NEGATIVE  / Bili: Negative / Urobili: 1.0 E.U./dL   Blood: x / Protein: 100 mg/dL / Nitrite: NEGATIVE   Leuk Esterase: NEGATIVE / RBC: < 5 /HPF / WBC < 5 /HPF   Sq Epi: x / Non Sq Epi: 0-5 /HPF / Bacteria: Present /HPF      CAPILLARY BLOOD GLUCOSE          RADIOLOGY & ADDITIONAL TESTS: Reviewed.

## 2019-08-17 NOTE — ED ADULT NURSE NOTE - INTERVENTIONS DEFINITIONS
Room bathroom lighting operational/Physically safe environment: no spills, clutter or unnecessary equipment/Review medications for side effects contributing to fall risk/Provide visual clues: red socks/Call bell, personal items and telephone within reach/Non-slip footwear when patient is off stretcher/Provide visual cue, wrist band, yellow gown, etc./Monitor for mental status changes and reorient to person, place, and time/[place] to call system/Instruct patient to call for assistance/Stretcher in lowest position, wheels locked, appropriate side rails in place/Monitor gait and stability/Reinforce activity limits and safety measures with patient and family

## 2019-08-17 NOTE — PROGRESS NOTE ADULT - PROBLEM SELECTOR PLAN 2
CT showed bilateral occlusive segmental and subsegmental PE with developing bilateral pulmonary infarct, likely 2/2 LUE AV fistula in setting of known thrombectomy. Echo without evidence of R heart strain. Echo with EF 55-60%, normal RV and LV function   -c/w eliquis 5mg BID (since 8/12) - for 3-6 months outpt

## 2019-08-17 NOTE — PROGRESS NOTE ADULT - PROBLEM SELECTOR PLAN 3
Pt with ESRD on HD, last (full session) on 8/09/19. Unclear etiology of ESRD.   -renal following  -HD Wednesday- had 2L taken off    #Orthostatic hypotension  -resolved

## 2019-08-17 NOTE — PROGRESS NOTE ADULT - PROBLEM SELECTOR PLAN 7
1) PCP Contacted on Admission: (Y/N) --> Name & Phone #: pt with no PCP  2) Date of Contact with PCP:  3) PCP Contacted at Discharge: (Y/N, N/A)  4) Summary of Handoff Given to PCP:   5) Post-Discharge Appointment Date and Location:
1) PCP Contacted on Admission: (Y/N) --> Name & Phone #: pt with no PCP  2) Date of Contact with PCP:  3) PCP Contacted at Discharge: (Y/N, N/A)  4) Summary of Handoff Given to PCP:   5) Post-Discharge Appointment Date and Location:

## 2019-08-17 NOTE — DISCHARGE NOTE NURSING/CASE MANAGEMENT/SOCIAL WORK - NSDCDPATPORTLINK_GEN_ALL_CORE
You can access the [facility], offered by [facility], by registering with the following website: [email or web address]

## 2019-08-17 NOTE — PROGRESS NOTE ADULT - PROBLEM SELECTOR PLAN 1
s/p LUE AVF thrombectomy; c/b pulmonary infarct; e/o R heart strain seen on CT but not TTE; cont. A/C w/ DOAC for at least 3 months; outpatient Heme f/u

## 2019-08-17 NOTE — DISCHARGE NOTE NURSING/CASE MANAGEMENT/SOCIAL WORK - NSDCFUADDAPPT_GEN_ALL_CORE_FT
Erika Kaminski)  Hematologist/Internal Medicine  [address] [place] [address]  Phone: [phone]  Fax: [phone]  Follow Up Time: call and make appointment to follow up 1-2 weeks w/ Dr. Kaminski for your blood clot and anemia

## 2019-08-18 VITALS
OXYGEN SATURATION: 99 % | RESPIRATION RATE: 16 BRPM | DIASTOLIC BLOOD PRESSURE: 86 MMHG | TEMPERATURE: 98 F | HEART RATE: 90 BPM | SYSTOLIC BLOOD PRESSURE: 110 MMHG

## 2019-08-18 PROCEDURE — 85610 PROTHROMBIN TIME: CPT

## 2019-08-18 PROCEDURE — 85025 COMPLETE CBC W/AUTO DIFF WBC: CPT

## 2019-08-18 PROCEDURE — 93005 ELECTROCARDIOGRAM TRACING: CPT

## 2019-08-18 PROCEDURE — 73562 X-RAY EXAM OF KNEE 3: CPT | Mod: 26,RT

## 2019-08-18 PROCEDURE — 36415 COLL VENOUS BLD VENIPUNCTURE: CPT

## 2019-08-18 PROCEDURE — 85730 THROMBOPLASTIN TIME PARTIAL: CPT

## 2019-08-18 PROCEDURE — 80053 COMPREHEN METABOLIC PANEL: CPT

## 2019-08-18 PROCEDURE — 84484 ASSAY OF TROPONIN QUANT: CPT

## 2019-08-18 PROCEDURE — 73562 X-RAY EXAM OF KNEE 3: CPT

## 2019-08-18 PROCEDURE — 99284 EMERGENCY DEPT VISIT MOD MDM: CPT | Mod: 25

## 2019-08-18 RX ORDER — APIXABAN 2.5 MG/1
1 TABLET, FILM COATED ORAL
Qty: 180 | Refills: 0
Start: 2019-08-18 | End: 2019-11-15

## 2019-08-18 NOTE — ED PROVIDER NOTE - PROGRESS NOTE DETAILS
Hemodynamically stable. Labs stable. Monitored in ED w/o tachypnea or hypoxia. Resting comfortably w/o resp distress. No heart strain on recent echo. Ambulated in the ED w/o difficulty. Knee XR neg. PT given can for support. Will d/c Hemodynamically stable. Labs stable. Monitored in ED w/o tachypnea or hypoxia. Resting comfortably w/o resp distress. No heart strain on recent echo. Ambulated in the ED w/o difficulty. Knee XR neg. PT given cane for support. Pt did not  his Rx's from [facility] upon discharge. Pharmacy changed and today's inpatient Rx's re-written. Will d/c

## 2019-08-18 NOTE — ED PROVIDER NOTE - OBJECTIVE STATEMENT
Pt w/ PMHx ESRD on HD [facility]Sa (last HD completed earlier today), HTN, recent dx PE on admission 8/9-8/17 discharged from the hospital just a few hours ago p/w multiple medical complaints (See discharge summary below). Pt reports he received HD in the hospital today. After HD, we went to the bathroom, felt lightheaded, and fell to the ground, hitting his R knee. Pt did not hit his head. No LOC. Pt was helped to bed, and laid down for a hour. Afterwards, pt was discharged from the hospital. Pt [place] subway [place], and was not feeling well, described as generalized body aches, L knee pain, abd cramping, and SOB. No CP, cough, n/v/d. Pt has eaten since HD. Pt reports he stays with a friend in [place], but prior chart denotes he is undomiciled.     Discharge summary from this morning -   "Homeless 47 yo M PMH ESRD on HD T, Th, Sat via left sided AV fistula presenting complaining of shortness of breath, fevers, chills and found to have  bilateral segmental PE (submassive) after open LUE fistula thrombectomy.  #Pulmonary Embolism- Pt still has some sob on exertion and pleuritic chest pain, but on room air satting well and stable for d/c. Continue eliquis 3 months  #fever- pt had low grade fever during admission. Had negative infectious workup. Never had leukocytosis. Thought to be 2/2 to PE  #ESRD on HD- pt did experience positive orthostatics, and given fluid bolus per nephro approval s/p HD   #Normocytic Anemia- downtrending during admission to [facility] (baseline 11). No signs of bleeding. Most likely 2/2 chronic disease/ESRD   Inpatient treatment course: 8/9 CT angio showed B/L PE. Started on heparin drip in ICU. ECHO w EF 55-60% w/ interventricular septal bounce, RV dysfunction with hypokinetic RV apex. Met criteria for severe sepsis (tachycardic, tachypnea, and suspected infection). BCx, UCx sent w suspicion for AV fistula infection given recent instrumentation to site on 8/6. HD done and started on Vanc. Placed on bipap and given Dilaudid for pleuritic pain. Transitioned to eliquis. 8/13 Transferred to the medical floor. pt febrile 101.4F had full fever workup which came back negative for infection, including ultrasound at fistula site which showed a small hematoma and no signs of abscess. Antibiotics were stopped and pt d/c in stable condition.    New Meds: Eliquis 5mg BID for 3 months  Exam to be followed outpatient: Follow up w/ hematologist Dr. Kaminski for clotting + anemia  workup"

## 2019-08-18 NOTE — ED PROVIDER NOTE - CLINICAL SUMMARY MEDICAL DECISION MAKING FREE TEXT BOX
Pt p/w vague, generalized complaints, knee pain. Pt just discharged from hospital today, s/p dx PE, no RV strain, s/p HD. Hemodynamically stable in ED. No evidence of resp distress. Afebrile. No evidence of fluid overload and just dialyzed today. No EKG changes to suggest new ischemia or infarction. Stable troponin in the setting of ESRD, no EKG changes, and no CP. Reports abd cramping, but no ttp on exam. No V/D. tolerating PO. Knee XR negative. No indication for re-admission.

## 2019-08-18 NOTE — ED PROVIDER NOTE - CHIEF COMPLAINT
The patient is a 48y Male complaining of shortness of breath. The patient is a 48y Male complaining of multiple medical complaints

## 2019-08-18 NOTE — ED PROVIDER NOTE - CARE PLAN
Principal Discharge DX:	Right knee pain, unspecified chronicity  Secondary Diagnosis:	Pulmonary embolism without acute cor pulmonale, unspecified chronicity, unspecified pulmonary embolism type

## 2019-08-18 NOTE — ED PROVIDER NOTE - PHYSICAL EXAMINATION
Constitutional: Well appearing, well nourished, awake, alert, oriented to person, place, time/situation and in no apparent distress.  Head atraumatic, normocephalic  ENMT: Airway patent. Normal MM  Eyes: Clear bilaterally, PERRL, EOMI  Cardiac: Normal rate, regular rhythm.  Heart sounds S1, S2.  No murmurs, rubs or gallops. No JVD or LE edema  Respiratory: Breaths sounds equal and clear b/l. No W/R/R. No increased WOB, tachypnea, hypoxia, or accessory mm use. Pt speaks in full sentences. O2 sat on RA 97-99%  Gastrointestinal: Abd soft, NT, ND, NABS. No guarding, rebound, or rigidity. No pulsatile abdominal masses. No organomegaly appreciated. No CVAT   Musculoskeletal: + mildly painful ROM R knee. no signs of trauma. no crepitus or deformity. NVI distally. LUE w/ palpable thrill  Neuro: Alert and oriented x 3, face symmetric and speech fluent. Strength 5/5 x 4 ext and symmetric, nml gross motor movement, nml gait. No focal deficits noted.  Skin: Skin normal color for race, warm, dry and intact. No evidence of rash.  Psych: Alert and oriented to person, place, time/situation. normal mood and affect. no apparent risk to self or others.

## 2019-08-19 ENCOUNTER — INBOUND DOCUMENT (OUTPATIENT)
Age: 49
End: 2019-08-19

## 2019-08-19 LAB
CULTURE RESULTS: SIGNIFICANT CHANGE UP
CULTURE RESULTS: SIGNIFICANT CHANGE UP
SPECIMEN SOURCE: SIGNIFICANT CHANGE UP
SPECIMEN SOURCE: SIGNIFICANT CHANGE UP

## 2019-08-21 NOTE — ED ADULT TRIAGE NOTE - NS ED NURSE BANDS TYPE
Marciano Ashraf MD  [facility] [place]   Address   [address] [address]   Phone Number   [phone]      Office is currently closed  Will try again later   Name band;

## 2019-08-22 DIAGNOSIS — T82.818A EMBOLISM DUE TO VASCULAR PROSTHETIC DEVICES, IMPLANTS AND GRAFTS, INITIAL ENCOUNTER: ICD-10-CM

## 2019-08-22 DIAGNOSIS — I26.99 OTHER PULMONARY EMBOLISM WITHOUT ACUTE COR PULMONALE: ICD-10-CM
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
## 2019-08-22 DIAGNOSIS — J96.00 ACUTE RESPIRATORY FAILURE, UNSPECIFIED WHETHER WITH HYPOXIA OR HYPERCAPNIA: ICD-10-CM

## 2019-08-22 DIAGNOSIS — T82.898A OTHER SPECIFIED COMPLICATION OF VASCULAR PROSTHETIC DEVICES, IMPLANTS AND GRAFTS, INITIAL ENCOUNTER: ICD-10-CM
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
## 2019-08-22 DIAGNOSIS — I95.1 ORTHOSTATIC HYPOTENSION: ICD-10-CM

## 2019-08-22 DIAGNOSIS — D63.1 ANEMIA IN CHRONIC KIDNEY DISEASE: ICD-10-CM

## 2019-08-22 DIAGNOSIS — K20.9 ESOPHAGITIS, UNSPECIFIED: ICD-10-CM

## 2019-08-22 DIAGNOSIS — R50.9 FEVER, UNSPECIFIED: ICD-10-CM

## 2019-08-22 DIAGNOSIS — N18.6 END STAGE RENAL DISEASE: ICD-10-CM

## 2019-08-22 DIAGNOSIS — D64.9 ANEMIA, UNSPECIFIED: ICD-10-CM

## 2019-08-30 DIAGNOSIS — Z79.01 LONG TERM (CURRENT) USE OF ANTICOAGULANTS: ICD-10-CM

## 2019-08-30 DIAGNOSIS — R06.02 SHORTNESS OF BREATH: ICD-10-CM

## 2019-08-30 DIAGNOSIS — R42 DIZZINESS AND GIDDINESS: ICD-10-CM

## 2019-08-30 DIAGNOSIS — N18.6 END STAGE RENAL DISEASE: ICD-10-CM

## 2019-08-30 DIAGNOSIS — M25.561 PAIN IN RIGHT KNEE: ICD-10-CM

## 2019-08-30 DIAGNOSIS — I26.99 OTHER PULMONARY EMBOLISM WITHOUT ACUTE COR PULMONALE: ICD-10-CM

## 2019-09-01 ENCOUNTER — OUTPATIENT (OUTPATIENT)
Dept: OUTPATIENT SERVICES | Facility: HOSPITAL | Age: 49
LOS: 1 days | End: 2019-09-01
Payer: COMMERCIAL

## 2019-09-01 DIAGNOSIS — I77.0 ARTERIOVENOUS FISTULA, ACQUIRED: Chronic | ICD-10-CM

## 2019-09-01 PROCEDURE — G9001: CPT

## 2019-09-12 DIAGNOSIS — Z71.89 OTHER SPECIFIED COUNSELING: ICD-10-CM

## 2019-09-27 NOTE — ED ADULT NURSE NOTE - CHPI ED NUR SYMPTOMS NEG
I spoke with pt  and relayed message per [facility], [facility].. He verbalized understanding.  I suggested following up with PCP
no weakness/no change in level of consciousness/no blurred vision/no loss of consciousness/no nausea/no vomiting/no confusion/no fever

## 2019-10-05 VITALS
TEMPERATURE: 98 F | OXYGEN SATURATION: 100 % | WEIGHT: 212.97 LBS | RESPIRATION RATE: 17 BRPM | HEART RATE: 110 BPM | SYSTOLIC BLOOD PRESSURE: 178 MMHG | HEIGHT: 69 IN | DIASTOLIC BLOOD PRESSURE: 86 MMHG

## 2019-10-05 LAB — EXTRA SST TUBE: SIGNIFICANT CHANGE UP

## 2019-10-05 RX ORDER — OXYCODONE AND ACETAMINOPHEN 5; 325 MG/1; MG/1
2 TABLET ORAL ONCE
Refills: 0 | Status: DISCONTINUED | OUTPATIENT
Start: 2019-10-05 | End: 2019-10-05

## 2019-10-05 RX ADMIN — OXYCODONE AND ACETAMINOPHEN 2 TABLET(S): 5; 325 TABLET ORAL at 23:42

## 2019-10-05 NOTE — ED ADULT TRIAGE NOTE - WEIGHT METHOD
Patient discharged with v/s stable. Written and verbal after care instructions 
given and explained. 

Patient verbalized understanding. Ambulatory with steady gait. All questions 
addressed prior to discharge. Advised to follow up with PMD. stated

## 2019-10-05 NOTE — ED ADULT NURSE NOTE - OBJECTIVE STATEMENT
pt to ER w/ report of L ankle pain today.  pt dialyzed successfully today, reports he thinks he twisted L ankle.  Pt noted to have approx 5cm blister w/ blood oozing from skin break in blister.  Pt denies cp/sob/n/v/f/c.  Breathing unlabored, skin warm and dry. Will continue to monitor.

## 2019-10-06 ENCOUNTER — INPATIENT (INPATIENT)
Facility: HOSPITAL | Age: 49
LOS: 0 days | Discharge: HOME | DRG: 604 | End: 2019-10-07
Attending: SURGERY | Admitting: SURGERY
Payer: COMMERCIAL
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
## 2019-10-06 DIAGNOSIS — I77.0 ARTERIOVENOUS FISTULA, ACQUIRED: Chronic | ICD-10-CM

## 2019-10-06 LAB
ANION GAP SERPL CALC-SCNC: 12 MMOL/L — SIGNIFICANT CHANGE UP (ref 5–17)
APTT BLD: 31.8 SEC — SIGNIFICANT CHANGE UP (ref 27.5–36.3)
BASOPHILS # BLD AUTO: 0.08 K/UL — SIGNIFICANT CHANGE UP (ref 0–0.2)
BASOPHILS NFR BLD AUTO: 0.9 % — SIGNIFICANT CHANGE UP (ref 0–2)
BLD GP AB SCN SERPL QL: NEGATIVE — SIGNIFICANT CHANGE UP
BUN SERPL-MCNC: 36 MG/DL — HIGH (ref 7–23)
CALCIUM SERPL-MCNC: 9.1 MG/DL — SIGNIFICANT CHANGE UP (ref 8.4–10.5)
CHLORIDE SERPL-SCNC: 98 MMOL/L — SIGNIFICANT CHANGE UP (ref 96–108)
CO2 SERPL-SCNC: 30 MMOL/L — SIGNIFICANT CHANGE UP (ref 22–31)
CREAT SERPL-MCNC: 5.45 MG/DL — HIGH (ref 0.5–1.3)
EOSINOPHIL # BLD AUTO: 0.41 K/UL — SIGNIFICANT CHANGE UP (ref 0–0.5)
EOSINOPHIL NFR BLD AUTO: 4.4 % — SIGNIFICANT CHANGE UP (ref 0–6)
GLUCOSE SERPL-MCNC: 99 MG/DL — SIGNIFICANT CHANGE UP (ref 70–99)
HCT VFR BLD CALC: 27.9 % — LOW (ref 39–50)
HCT VFR BLD CALC: 30.4 % — LOW (ref 39–50)
HGB BLD-MCNC: 9.1 G/DL — LOW (ref 13–17)
HGB BLD-MCNC: 9.8 G/DL — LOW (ref 13–17)
INR BLD: 1.28 — HIGH (ref 0.88–1.16)
LYMPHOCYTES # BLD AUTO: 0 % — LOW (ref 13–44)
LYMPHOCYTES # BLD AUTO: 0 K/UL — LOW (ref 1–3.3)
MCHC RBC-ENTMCNC: 28.8 PG — SIGNIFICANT CHANGE UP (ref 27–34)
MCHC RBC-ENTMCNC: 29.1 PG — SIGNIFICANT CHANGE UP (ref 27–34)
MCHC RBC-ENTMCNC: 32.2 GM/DL — SIGNIFICANT CHANGE UP (ref 32–36)
MCHC RBC-ENTMCNC: 32.6 GM/DL — SIGNIFICANT CHANGE UP (ref 32–36)
MCV RBC AUTO: 89.1 FL — SIGNIFICANT CHANGE UP (ref 80–100)
MCV RBC AUTO: 89.4 FL — SIGNIFICANT CHANGE UP (ref 80–100)
MONOCYTES # BLD AUTO: 0.32 K/UL — SIGNIFICANT CHANGE UP (ref 0–0.9)
MONOCYTES NFR BLD AUTO: 3.5 % — SIGNIFICANT CHANGE UP (ref 2–14)
NEUTROPHILS # BLD AUTO: 8.37 K/UL — HIGH (ref 1.8–7.4)
NEUTROPHILS NFR BLD AUTO: 90.3 % — HIGH (ref 43–77)
NRBC # BLD: 0 /100 WBCS — SIGNIFICANT CHANGE UP (ref 0–0)
PLATELET # BLD AUTO: 254 K/UL — SIGNIFICANT CHANGE UP (ref 150–400)
PLATELET # BLD AUTO: 274 K/UL — SIGNIFICANT CHANGE UP (ref 150–400)
POTASSIUM SERPL-MCNC: 4.1 MMOL/L — SIGNIFICANT CHANGE UP (ref 3.5–5.3)
POTASSIUM SERPL-SCNC: 4.1 MMOL/L — SIGNIFICANT CHANGE UP (ref 3.5–5.3)
PROTHROM AB SERPL-ACNC: 14.6 SEC — HIGH (ref 10–12.9)
RBC # BLD: 3.13 M/UL — LOW (ref 4.2–5.8)
RBC # BLD: 3.4 M/UL — LOW (ref 4.2–5.8)
RBC # FLD: 14.9 % — HIGH (ref 10.3–14.5)
RBC # FLD: 15 % — HIGH (ref 10.3–14.5)
RH IG SCN BLD-IMP: POSITIVE — SIGNIFICANT CHANGE UP
SODIUM SERPL-SCNC: 140 MMOL/L — SIGNIFICANT CHANGE UP (ref 135–145)
WBC # BLD: 8.36 K/UL — SIGNIFICANT CHANGE UP (ref 3.8–10.5)
WBC # BLD: 9.27 K/UL — SIGNIFICANT CHANGE UP (ref 3.8–10.5)
WBC # FLD AUTO: 8.36 K/UL — SIGNIFICANT CHANGE UP (ref 3.8–10.5)
WBC # FLD AUTO: 9.27 K/UL — SIGNIFICANT CHANGE UP (ref 3.8–10.5)

## 2019-10-06 PROCEDURE — 73610 X-RAY EXAM OF ANKLE: CPT | Mod: 26,LT

## 2019-10-06 PROCEDURE — 99283 EMERGENCY DEPT VISIT LOW MDM: CPT

## 2019-10-06 PROCEDURE — 99285 EMERGENCY DEPT VISIT HI MDM: CPT

## 2019-10-06 PROCEDURE — 71045 X-RAY EXAM CHEST 1 VIEW: CPT | Mod: 26

## 2019-10-06 PROCEDURE — 73700 CT LOWER EXTREMITY W/O DYE: CPT | Mod: 26,LT

## 2019-10-06 RX ORDER — CEFAZOLIN SODIUM 1 G
VIAL (EA) INJECTION
Refills: 0 | Status: DISCONTINUED | OUTPATIENT
Start: 2019-10-06 | End: 2019-10-07

## 2019-10-06 RX ORDER — CEFAZOLIN SODIUM 1 G
1000 VIAL (EA) INJECTION ONCE
Refills: 0 | Status: COMPLETED | OUTPATIENT
Start: 2019-10-06 | End: 2019-10-06

## 2019-10-06 RX ORDER — ACETAMINOPHEN 500 MG
1000 TABLET ORAL ONCE
Refills: 0 | Status: COMPLETED | OUTPATIENT
Start: 2019-10-06 | End: 2019-10-06

## 2019-10-06 RX ORDER — CEFAZOLIN SODIUM 1 G
1000 VIAL (EA) INJECTION EVERY 24 HOURS
Refills: 0 | Status: DISCONTINUED | OUTPATIENT
Start: 2019-10-07 | End: 2019-10-07

## 2019-10-06 RX ORDER — CALCIUM ACETATE 667 MG
2001 TABLET ORAL
Refills: 0 | Status: DISCONTINUED | OUTPATIENT
Start: 2019-10-06 | End: 2019-10-07

## 2019-10-06 RX ORDER — LIDOCAINE HCL 20 MG/ML
50 VIAL (ML) INJECTION ONCE
Refills: 0 | Status: COMPLETED | OUTPATIENT
Start: 2019-10-06 | End: 2019-10-06

## 2019-10-06 RX ORDER — HYDROMORPHONE HYDROCHLORIDE 2 MG/ML
1 INJECTION INTRAMUSCULAR; INTRAVENOUS; SUBCUTANEOUS ONCE
Refills: 0 | Status: DISCONTINUED | OUTPATIENT
Start: 2019-10-06 | End: 2019-10-06

## 2019-10-06 RX ORDER — MORPHINE SULFATE 50 MG/1
4 CAPSULE, EXTENDED RELEASE ORAL ONCE
Refills: 0 | Status: DISCONTINUED | OUTPATIENT
Start: 2019-10-06 | End: 2019-10-06

## 2019-10-06 RX ADMIN — Medication 50 MILLILITER(S): at 11:40

## 2019-10-06 RX ADMIN — Medication 1000 MILLIGRAM(S): at 08:40

## 2019-10-06 RX ADMIN — MORPHINE SULFATE 4 MILLIGRAM(S): 50 CAPSULE, EXTENDED RELEASE ORAL at 02:44

## 2019-10-06 RX ADMIN — OXYCODONE AND ACETAMINOPHEN 2 TABLET(S): 5; 325 TABLET ORAL at 06:23

## 2019-10-06 RX ADMIN — HYDROMORPHONE HYDROCHLORIDE 1 MILLIGRAM(S): 2 INJECTION INTRAMUSCULAR; INTRAVENOUS; SUBCUTANEOUS at 11:31

## 2019-10-06 RX ADMIN — MORPHINE SULFATE 4 MILLIGRAM(S): 50 CAPSULE, EXTENDED RELEASE ORAL at 03:30

## 2019-10-06 RX ADMIN — Medication 400 MILLIGRAM(S): at 08:15

## 2019-10-06 RX ADMIN — Medication 1000 MILLIGRAM(S): at 14:15

## 2019-10-06 RX ADMIN — HYDROMORPHONE HYDROCHLORIDE 1 MILLIGRAM(S): 2 INJECTION INTRAMUSCULAR; INTRAVENOUS; SUBCUTANEOUS at 14:18

## 2019-10-06 NOTE — H&P ADULT - HISTORY OF PRESENT ILLNESS
48M with PMH of HTN, ESRD (TuThuSat), recent PE after fistula declot, currently on Eliquis (last dose Sat AM), presents with 1 week of ankle swelling and pain. Patient states he had been doing well since discharge, consistently taking his Eliquis. He sprained his ankle last week playing basketball. He went to an OSH ED and was told there was no fracture, just a small hematoma. He was discharged with instructions for compression. Earlier today after dialysis, he took down his compression dressing and noticed more swelling around his ankle, and noted a popped blister. It was tender to touch and painful to walk, so he came to the ED for evaluation. Denies CP/SOB, denies N/V.    In the ED he was afebrile, , SBP 180s. Labs were WNL. Vascular consulted to rule out compartment syndrome.

## 2019-10-06 NOTE — PROCEDURE NOTE - NSICDXPROCEDURE_GEN_ALL_CORE_FT
PROCEDURES:  Incision and drainage, hematoma 06-Oct-2019 12:26:54 Anterolateral surface of left lower extremity Fede Gallardo

## 2019-10-06 NOTE — ED ADULT NURSE REASSESSMENT NOTE - NS ED NURSE REASSESS COMMENT FT1
PATIENT REFUSED RECTAL TEMPERATURE AT THIS TIME DR DUARTE AWARE AS PER MD DUARTE WILL FOLLOW UP WHEN PATIENT GETS TO THE ROOM 8699.

## 2019-10-06 NOTE — ED PROVIDER NOTE - PHYSICAL EXAMINATION
Constitutional: Well appearing, obese, awake, alert, oriented to person, place, time/situation and in no apparent distress.  ENMT: Airway patent. Normal MM  Eyes: Clear bilaterally  Cardiac: Tachycardic, regular rhythm.  Heart sounds S1, S2.  No murmurs, rubs or gallops.  Respiratory: Breaths sounds equal and clear b/l. No increased WOB, tachypnea, hypoxia, or accessory mm use. Pt speaks in full sentences.   Gastrointestinal: Abd soft, NT, ND, NABS. No guarding, rebound, or rigidity. No pulsatile abdominal masses. No organomegaly appreciated. No CVAT   Musculoskeletal: Range of motion is not limited. + moderate swelling of the L ankle, superior to the malleolus with open blood-filled blister. edema encompasses anterior ankle, is tense, very painful. + induration. no clear fluctuance. mildly warm. no clear skin changes. edema into foot and lower leg. No calf ttp  Vasc: 2+ pedal pulse. sensation intact. motor intact.   Neuro: Alert and oriented x 3, face symmetric and speech fluent. Strength 5/5 x 4 ext and symmetric, nml gross motor movement, nml gait. No focal deficits noted.  Skin: Skin normal color for race, warm, dry and intact. No evidence of rash.  Psych: Alert and oriented to person, place, time/situation. normal mood and affect. no apparent risk to self or others.

## 2019-10-06 NOTE — ED PROVIDER NOTE - CLINICAL SUMMARY MEDICAL DECISION MAKING FREE TEXT BOX
Pt p/w sig indurated swelling to the ankle with large blood blister, likely hematoma, area tense and painful. no signs of compartment syndrome at this time, however given the sig swelling, will likely consult vascular for eval. XR / CT first for further evaluation. Other considerations, although less likely, missed fx, abscess. Exam not c/w DVT.

## 2019-10-06 NOTE — PROCEDURE NOTE - ADDITIONAL PROCEDURE DETAILS
Patient was consented prior to procedure, 1 mg IV dilaudid was given for pain control. LLE anterolateral surface was prepped with povidone at bedside. Sterile blue towels were placed and a circular drape was laid on top. 1% lidocaine was used to numb the field. #10 blade was used to make a 5cm incision into the center of hematoma. Hematoma was manually evacuated by applying pressure throughout the surface and directing the pressure towards the incision. Approximately 200cc of hematoma was evacuated. Curved kellys were used to break up any septations and ensure there was no significant volume of retained hematoma. Hematoma cavity was flushed with sterile saline. Wet kerlix was used to pack the hematoma and then wrapped around the leg. ACE wraps were then wrapped over. He was given IV ancef shortly after the procedure, renally dosed.

## 2019-10-06 NOTE — PROGRESS NOTE ADULT - ASSESSMENT
48M with PMH of HTN, ESRD (TuThuSat), recent PE after fistula declot, currently on Eliquis, presents with 1 week of ankle swelling and pain, being evaluated for compartment syndrome, CT finding show large L ankle hematoma.    - Hold Eliquis   - Serial CBCs  - Keep NPO  - Add-on T+S  - Possible OR today for hematoma evacuation vs. serial monitoring  - f/u labs

## 2019-10-06 NOTE — CONSULT NOTE ADULT - SUBJECTIVE AND OBJECTIVE BOX
Attending: Tatum    HPI:  48M with PMH of HTN, ESRD (TuThuSat), recent PE after fistula declot, currently on Eliquis, presents with 1 week of ankle swelling and pain. Patient states he had been doing well since discharge, consistently taking his Eliquis. He sprained his ankle last week playing basketball. He went to an OSH ED and was told there was no fracture, just a small hematoma. He was discharged with instructions for compression. Earlier today after dialysis, he took down his compression dressing and noticed more swelling around his ankle, and noted a popped blister. It was tender to touch and painful to walk, so he came to the ED for evaluation. Denies CP/SOB, denies N/V.    In the ED he was afebrile, , SBP 180s. Labs were WNL. Vascular consulted to rule out compartment syndrome.    PAST MEDICAL & SURGICAL HISTORY:  ESRD (end stage renal disease) on dialysis  AV fistula    Allergies  No Known Allergies    FAMILY HISTORY:  No pertinent family history in first degree relatives    Vital Signs Last 24 Hrs  T(C): 36.7 (05 Oct 2019 23:24), Max: 36.9 (05 Oct 2019 21:57)  T(F): 98 (05 Oct 2019 23:24), Max: 98.5 (05 Oct 2019 21:57)  HR: 105 (05 Oct 2019 23:24) (105 - 110)  BP: 182/89 (05 Oct 2019 23:24) (178/86 - 182/89)  BP(mean): --  RR: 17 (05 Oct 2019 23:24) (17 - 17)  SpO2: 95% (05 Oct 2019 23:24) (95% - 100%)    Physical Exam:  General: NAD, resting comfortably  Pulmonary: normal resp effort  Extremities: LLE ankle swelling with overlying 3x3cm popped blister; area around blister tense, TTP, no purulence, drainage, or erythema; 2+ DP/PT pulse; LE compartments soft, motor/sensation intact  Neuro: A/O x 3    LABS:                     9.8    9.27  )-----------( 274      ( 05 Oct 2019 23:43 )             30.4     10-05  140  |  98  |  36<H>  ----------------------------<  99  4.1   |  30  |  5.45<H>    Ca    9.1      05 Oct 2019 23:43    PT/INR - ( 05 Oct 2019 23:43 )   PT: 14.6 sec;   INR: 1.28     PTT - ( 05 Oct 2019 23:43 )  PTT:31.8 sec    CT Scan read as Attending: Tatum    HPI:  48M with PMH of HTN, ESRD (TuThuSat), recent PE after fistula declot, currently on Eliquis (last dose Sat AM), presents with 1 week of ankle swelling and pain. Patient states he had been doing well since discharge, consistently taking his Eliquis. He sprained his ankle last week playing basketball. He went to an OSH ED and was told there was no fracture, just a small hematoma. He was discharged with instructions for compression. Earlier today after dialysis, he took down his compression dressing and noticed more swelling around his ankle, and noted a popped blister. It was tender to touch and painful to walk, so he came to the ED for evaluation. Denies CP/SOB, denies N/V.    In the ED he was afebrile, , SBP 180s. Labs were WNL. Vascular consulted to rule out compartment syndrome.    PAST MEDICAL & SURGICAL HISTORY:  ESRD (end stage renal disease) on dialysis  AV fistula    Allergies  No Known Allergies    FAMILY HISTORY:  No pertinent family history in first degree relatives    Vital Signs Last 24 Hrs  T(C): 36.7 (05 Oct 2019 23:24), Max: 36.9 (05 Oct 2019 21:57)  T(F): 98 (05 Oct 2019 23:24), Max: 98.5 (05 Oct 2019 21:57)  HR: 105 (05 Oct 2019 23:24) (105 - 110)  BP: 182/89 (05 Oct 2019 23:24) (178/86 - 182/89)  BP(mean): --  RR: 17 (05 Oct 2019 23:24) (17 - 17)  SpO2: 95% (05 Oct 2019 23:24) (95% - 100%)    Physical Exam:  General: NAD, resting comfortably  Pulmonary: normal resp effort  Extremities: LLE ankle swelling with overlying 3x3cm popped blister; area around blister tense, TTP, no purulence, drainage, or erythema; 2+ DP/PT pulse; LE compartments soft, motor/sensation intact  Neuro: A/O x 3    LABS:                     9.8    9.27  )-----------( 274      ( 05 Oct 2019 23:43 )             30.4     10-05  140  |  98  |  36<H>  ----------------------------<  99  4.1   |  30  |  5.45<H>    Ca    9.1      05 Oct 2019 23:43    PT/INR - ( 05 Oct 2019 23:43 )   PT: 14.6 sec;   INR: 1.28     PTT - ( 05 Oct 2019 23:43 )  PTT:31.8 sec    CT Scan read as

## 2019-10-06 NOTE — CONSULT NOTE ADULT - ASSESSMENT
48M with PMH of HTN, ESRD (TuThuSat), recent PE after fistula declot, currently on Eliquis, presents with 1 week of ankle swelling and pain, being evaluated for compartment syndrome, CT finding show large L ankle hematoma    - Less concerned for compartment syndrome given LLE compartments soft, palpable pulses, motor/sensation intact  - Given recent ankle trauma and CT findings, consider orthopedics consult for possible evac/monitoring  - Keep NPO in case of intervention  - Discussed with Chief Resident 48M with PMH of HTN, ESRD (TuThuSat), recent PE after fistula declot, currently on Eliquis, presents with 1 week of ankle swelling and pain, being evaluated for compartment syndrome, CT finding show large L ankle hematoma    - Less concerned for compartment syndrome given LLE compartments soft, palpable pulses, motor/sensation intact  - Recommend compression/elevation of ankle  - Given recent ankle trauma and CT findings, consider orthopedics consult for possible evac/monitoring  - Keep NPO in case of intervention  - Discussed with Chief Resident

## 2019-10-06 NOTE — CONSULT NOTE ADULT - SUBJECTIVE AND OBJECTIVE BOX
Orthopaedic Surgery Consult Note    For Surgeon: Lilian    HPI:  48M hx HTN, ESRD on HD (Tu, Th, Sat), PE on eliquis s/p fall while basketball with L ankle sprain x 1 wk evaluated at an OSH. Xrays done at that time were negative for fracture. Hematoma was noted and pt was instructed to RICE. Noticed increased pain in ankle after dialysis yesterday, took down dressing and noticed popped blister. Have been ambulating with crutches. Denies fevers, chills, SOB, chest pain, numbness or tingling. Ortho consulted to r/o compartment syndrome. Compartments soft and compressible. 2+ DP pulse, sensation and motor strength intact.      Allergies    No Known Allergies    Intolerances      PAST MEDICAL & SURGICAL HISTORY:  ESRD (end stage renal disease) on dialysis  AV fistula    MEDICATIONS  (STANDING):    MEDICATIONS  (PRN):      Vital Signs Last 24 Hrs  T(C): 36.7 (05 Oct 2019 23:24), Max: 36.9 (05 Oct 2019 21:57)  T(F): 98 (05 Oct 2019 23:24), Max: 98.5 (05 Oct 2019 21:57)  HR: 105 (05 Oct 2019 23:24) (105 - 110)  BP: 182/89 (05 Oct 2019 23:24) (178/86 - 182/89)  BP(mean): --  RR: 17 (05 Oct 2019 23:24) (17 - 17)  SpO2: 95% (05 Oct 2019 23:24) (95% - 100%)    Physical Exam:  Gen: Awake and alert, NAD  LLE: Chronic venous stasis skin changes  Lateral popped blister  Moderate swelling anterolateral aspect of ankle  No erythema or fluctuance   TTP  Sensation intact to light touch s/s/sp/dp/t  Motor strength FHL/EHL/TA/GS firing   2+ DP pulse  Compartments soft and compressible                         9.8    9.27  )-----------( 274      ( 05 Oct 2019 23:43 )             30.4     10-05    140  |  98  |  36<H>  ----------------------------<  99  4.1   |  30  |  5.45<H>    Ca    9.1      05 Oct 2019 23:43      PT/INR - ( 05 Oct 2019 23:43 )   PT: 14.6 sec;   INR: 1.28          PTT - ( 05 Oct 2019 23:43 )  PTT:31.8 sec  Imaging:   Xray - negative for fracture   CT: IMPRESSION:   Large hematoma measuring up to 10 cm in the subcutaneous tissues of the left   anterolateral distal calf.     No acute fractures.    A/P: 48M s/p fall x 1wk with L ankle swelling, no fracture but large hematoma    - Given history and clinical exam, less likely compartment syndrome. Compartments soft, neurovasc intact  - rec PWB with crutches  - pain control  - elevate  - compression    -Discussed with Dr. Quintana

## 2019-10-06 NOTE — H&P ADULT - NSHPLABSRESULTS_GEN_ALL_CORE
9.8    9.27  )-----------( 274      ( 05 Oct 2019 23:43 )             30.4     10-05  140  |  98  |  36<H>  ----------------------------<  99  4.1   |  30  |  5.45<H>    Ca    9.1      05 Oct 2019 23:43    PT/INR - ( 05 Oct 2019 23:43 )   PT: 14.6 sec;   INR: 1.28     PTT - ( 05 Oct 2019 23:43 )  PTT:31.8 sec    IMPRESSION:  Large hematoma measuring up to 10 cm in the subcutaneous tissues of the   left anterolateral distal calf.    < end of copied text >

## 2019-10-06 NOTE — CONSULT NOTE ADULT - ATTENDING COMMENTS
Based on large hematoma with history of anticoagulation, recommend vascular surgery evaluation for possible hematoma evacuation and ligation as needed.  No orthopedic intervention recommended.

## 2019-10-06 NOTE — ED PROVIDER NOTE - OBJECTIVE STATEMENT
Pt w/ PMHx ESRD on HD ([facility] - last HD earlier today, completed), HTN, recent dx PE (no DVT at that time 8/2019) on Eliquis Pt w/ PMHx ESRD on HD ([facility] - last HD earlier today, completed), HTN, recent dx PE (no DVT at that time 8/2019) on Eliquis, p/w L ankle swelling / pain. Pt reports he fell approx 1 week ago, twisted his L ankle. Pt went to OSH where he states he had XR neg for fx, and CT and was told he has blood under the skin. Pt has been using crutches and minimally weight bearing, and has had an ace wrapped around the ankle. Pt had worsening pain tonight and took the ace off for the first time, and noted open blister. Pt c/o pain and swelling at the site. No calf pain. No CP, SOB. No f/c. No numbness / tingling or weakness.

## 2019-10-06 NOTE — H&P ADULT - ASSESSMENT
48M with PMH of HTN, ESRD (TuThuSat), recent PE after fistula declot, currently on Eliquis, presents with 1 week of ankle swelling and pain, being evaluated for compartment syndrome, CT finding show large L ankle hematoma.    - Admit to Vascular Surgery; Dr. Winn, [address] [place] [address]  - Hold Eliquis for now  - Serial CBCs  - Keep NPO  - Add-on T+S  - Possible hematoma evacuation vs. serial monitoring

## 2019-10-06 NOTE — PROCEDURE NOTE - DRAIN DEVICE
Manually Drained, pressure applied to hematoma site, evacuated through 5cm incision at center of hematoma

## 2019-10-06 NOTE — PROGRESS NOTE ADULT - SUBJECTIVE AND OBJECTIVE BOX
INTERVAL HPI: Still complaining of LLE pain although pain is improved compared to yesterday. States that swelling has not improved. Has not tried ambulating since arrival. Denies any fevers or chills.       Vital Signs Last 24 Hrs  T(C): 36.9 (06 Oct 2019 03:47), Max: 36.9 (05 Oct 2019 21:57)  T(F): 98.4 (06 Oct 2019 03:47), Max: 98.5 (05 Oct 2019 21:57)  HR: 107 (06 Oct 2019 03:47) (105 - 110)  BP: 163/85 (06 Oct 2019 03:47) (163/85 - 182/89)  BP(mean): --  RR: 16 (06 Oct 2019 03:47) (16 - 17)  SpO2: 97% (06 Oct 2019 03:47) (95% - 100%)    Physical Exam:  General: NAD, resting comfortably  Pulmonary: normal resp effort  Extremities: LLE ankle swelling with overlying 3x3cm popped blister; area around blister tense, TTP, no purulence, drainage, or erythema; 2+ DP/PT pulse; LE compartments soft, motor/sensation intact      LABS:           	             9.8    9.27  )-----------( 274      ( 05 Oct 2019 23:43 )             30.4     10-05    140  |  98  |  36<H>  ----------------------------<  99  4.1   |  30  |  5.45<H>    Ca    9.1      05 Oct 2019 23:43      PT/INR - ( 05 Oct 2019 23:43 )   PT: 14.6 sec;   INR: 1.28          PTT - ( 05 Oct 2019 23:43 )  PTT:31.8 sec      Assessment and Plan:

## 2019-10-06 NOTE — H&P ADULT - NSHPPHYSICALEXAM_GEN_ALL_CORE
Physical Exam:  General: NAD, resting comfortably  Pulmonary: normal resp effort  Extremities: LLE ankle swelling with overlying 3x3cm popped blister; area around blister tense, TTP, no purulence, drainage, or erythema; 2+ DP/PT pulse; LE compartments soft, motor/sensation intact  Neuro: A/O x 3

## 2019-10-07 ENCOUNTER — TRANSCRIPTION ENCOUNTER (OUTPATIENT)
Age: 49
End: 2019-10-07

## 2019-10-07 VITALS — TEMPERATURE: 99 F

## 2019-10-07 LAB
ANION GAP SERPL CALC-SCNC: 13 MMOL/L — SIGNIFICANT CHANGE UP (ref 5–17)
APPEARANCE UR: CLEAR — SIGNIFICANT CHANGE UP
BILIRUB UR-MCNC: NEGATIVE — SIGNIFICANT CHANGE UP
BUN SERPL-MCNC: 58 MG/DL — HIGH (ref 7–23)
CALCIUM SERPL-MCNC: 9.1 MG/DL — SIGNIFICANT CHANGE UP (ref 8.4–10.5)
CHLORIDE SERPL-SCNC: 101 MMOL/L — SIGNIFICANT CHANGE UP (ref 96–108)
CO2 SERPL-SCNC: 24 MMOL/L — SIGNIFICANT CHANGE UP (ref 22–31)
COLOR SPEC: YELLOW — SIGNIFICANT CHANGE UP
CREAT SERPL-MCNC: 9.16 MG/DL — HIGH (ref 0.5–1.3)
DIFF PNL FLD: ABNORMAL
GLUCOSE SERPL-MCNC: 60 MG/DL — LOW (ref 70–99)
GLUCOSE UR QL: NEGATIVE — SIGNIFICANT CHANGE UP
HCT VFR BLD CALC: 28.5 % — LOW (ref 39–50)
HGB BLD-MCNC: 9.1 G/DL — LOW (ref 13–17)
KETONES UR-MCNC: NEGATIVE — SIGNIFICANT CHANGE UP
LEUKOCYTE ESTERASE UR-ACNC: NEGATIVE — SIGNIFICANT CHANGE UP
MAGNESIUM SERPL-MCNC: 2.3 MG/DL — SIGNIFICANT CHANGE UP (ref 1.6–2.6)
MCHC RBC-ENTMCNC: 28.7 PG — SIGNIFICANT CHANGE UP (ref 27–34)
MCHC RBC-ENTMCNC: 31.9 GM/DL — LOW (ref 32–36)
MCV RBC AUTO: 89.9 FL — SIGNIFICANT CHANGE UP (ref 80–100)
NITRITE UR-MCNC: NEGATIVE — SIGNIFICANT CHANGE UP
NRBC # BLD: 0 /100 WBCS — SIGNIFICANT CHANGE UP (ref 0–0)
PH UR: 8 — SIGNIFICANT CHANGE UP (ref 5–8)
PHOSPHATE SERPL-MCNC: 4.1 MG/DL — SIGNIFICANT CHANGE UP (ref 2.5–4.5)
PLATELET # BLD AUTO: 254 K/UL — SIGNIFICANT CHANGE UP (ref 150–400)
POTASSIUM SERPL-MCNC: 4.4 MMOL/L — SIGNIFICANT CHANGE UP (ref 3.5–5.3)
POTASSIUM SERPL-SCNC: 4.4 MMOL/L — SIGNIFICANT CHANGE UP (ref 3.5–5.3)
PROT UR-MCNC: 30 MG/DL
RBC # BLD: 3.17 M/UL — LOW (ref 4.2–5.8)
RBC # FLD: 14.7 % — HIGH (ref 10.3–14.5)
SODIUM SERPL-SCNC: 138 MMOL/L — SIGNIFICANT CHANGE UP (ref 135–145)
SP GR SPEC: 1.01 — SIGNIFICANT CHANGE UP (ref 1–1.03)
UROBILINOGEN FLD QL: 0.2 E.U./DL — SIGNIFICANT CHANGE UP
WBC # BLD: 8.89 K/UL — SIGNIFICANT CHANGE UP (ref 3.8–10.5)
WBC # FLD AUTO: 8.89 K/UL — SIGNIFICANT CHANGE UP (ref 3.8–10.5)

## 2019-10-07 PROCEDURE — 99222 1ST HOSP IP/OBS MODERATE 55: CPT | Mod: 24,GC

## 2019-10-07 RX ORDER — ACETAMINOPHEN 500 MG
650 TABLET ORAL ONCE
Refills: 0 | Status: COMPLETED | OUTPATIENT
Start: 2019-10-07 | End: 2019-10-07

## 2019-10-07 RX ORDER — OXYCODONE AND ACETAMINOPHEN 5; 325 MG/1; MG/1
2 TABLET ORAL EVERY 4 HOURS
Refills: 0 | Status: DISCONTINUED | OUTPATIENT
Start: 2019-10-07 | End: 2019-10-07

## 2019-10-07 RX ADMIN — OXYCODONE AND ACETAMINOPHEN 2 TABLET(S): 5; 325 TABLET ORAL at 13:16

## 2019-10-07 RX ADMIN — Medication 2001 MILLIGRAM(S): at 11:54

## 2019-10-07 RX ADMIN — OXYCODONE AND ACETAMINOPHEN 2 TABLET(S): 5; 325 TABLET ORAL at 06:29

## 2019-10-07 RX ADMIN — Medication 1000 MILLIGRAM(S): at 11:53

## 2019-10-07 RX ADMIN — OXYCODONE AND ACETAMINOPHEN 2 TABLET(S): 5; 325 TABLET ORAL at 11:53

## 2019-10-07 RX ADMIN — Medication 2001 MILLIGRAM(S): at 09:26

## 2019-10-07 RX ADMIN — Medication 650 MILLIGRAM(S): at 01:15

## 2019-10-07 RX ADMIN — Medication 650 MILLIGRAM(S): at 00:43

## 2019-10-07 NOTE — PROGRESS NOTE ADULT - SUBJECTIVE AND OBJECTIVE BOX
O/N: Febrile 101.1, blood culture sent, UA negative  10/6: bedside I&D of hematoma, consent in chart, started IV ancef q24, regular renal diet, restarted home Calcium Acetate TID     ceFAZolin  Injectable.   ceFAZolin  Injectable. 1000  ceFAZolin  Injectable.   ceFAZolin  Injectable. 1000        Vital Signs Last 24 Hrs  T(C): 36.9 (07 Oct 2019 04:30), Max: 38.4 (07 Oct 2019 00:08)  T(F): 98.5 (07 Oct 2019 04:30), Max: 101.1 (07 Oct 2019 00:08)  HR: 99 (07 Oct 2019 00:45) (88 - 106)  BP: 135/73 (07 Oct 2019 00:45) (130/62 - 145/79)  BP(mean): --  RR: 18 (07 Oct 2019 00:45) (16 - 20)  SpO2: 97% (07 Oct 2019 00:45) (90% - 97%)  I&O's Summary    06 Oct 2019 07:01  -  07 Oct 2019 05:52  --------------------------------------------------------  IN: 0 mL / OUT: 1350 mL / NET: -1350 mL      Physical Exam:  General: NAD, resting comfortably  Pulmonary: normal resp effort  Extremities: LLE ankle swelling minimal s/p evacuation; soft, still TTP, packed with wet to dry kerlix and wrapped with kerlix on top. 2+ DP/PT pulse; motor/sensation intact      LABS:                        9.1    8.36  )-----------( 254      ( 06 Oct 2019 08:17 )             27.9     10-05    140  |  98  |  36<H>  ----------------------------<  99  4.1   |  30  |  5.45<H>    Ca    9.1      05 Oct 2019 23:43      PT/INR - ( 05 Oct 2019 23:43 )   PT: 14.6 sec;   INR: 1.28          PTT - ( 05 Oct 2019 23:43 )  PTT:31.8 sec O/N: Febrile 101.1, blood culture sent, UA negative  10/6: bedside I&D of hematoma, consent in chart, started IV ancef q24, regular renal diet, restarted home Calcium Acetate TID     ceFAZolin  Injectable.   ceFAZolin  Injectable. 1000  ceFAZolin  Injectable.   ceFAZolin  Injectable. 1000        Vital Signs Last 24 Hrs  T(C): 36.9 (07 Oct 2019 04:30), Max: 38.4 (07 Oct 2019 00:08)  T(F): 98.5 (07 Oct 2019 04:30), Max: 101.1 (07 Oct 2019 00:08)  HR: 99 (07 Oct 2019 00:45) (88 - 106)  BP: 135/73 (07 Oct 2019 00:45) (130/62 - 145/79)  BP(mean): --  RR: 18 (07 Oct 2019 00:45) (16 - 20)  SpO2: 97% (07 Oct 2019 00:45) (90% - 97%)  I&O's Summary    06 Oct 2019 07:01  -  07 Oct 2019 05:52  --------------------------------------------------------  IN: 0 mL / OUT: 1350 mL / NET: -1350 mL      Physical Exam:  General: NAD, resting comfortably  Pulmonary: normal resp effort  Extremities: LLE ankle swelling minimal s/p evacuation; soft, still TTP, packed with wet to dry kerlix and wrapped with kerlix on top. 2+ DP/PT pulse; motor/sensation intact      LABS:                        9.1    8.36  )-----------( 254      ( 06 Oct 2019 08:17 )             27.9     10-05    140  |  98  |  36<H>  ----------------------------<  99  4.1   |  30  |  5.45<H>    Ca    9.1      05 Oct 2019 23:43      PT/INR - ( 05 Oct 2019 23:43 )   PT: 14.6 sec;   INR: 1.28          PTT - ( 05 Oct 2019 23:43 )  PTT:31.8 sec    PLEASE CHECK WHEN PRESENT:     [  ] Heart Failure     [  ] Acute     [  ] Acute on Chronic     [ ] Chronic  -------------------------------------------------------------------     [  ]Diastolic [HFpEF]     [  ]Systolic [HFrEF]     [  ]Combined [HFpEF & HFrEF]     [  ]Other:  -------------------------------------------------------------------  [ ] Respiratory failure  [ ] Acute cor pulmonale  [ ] Asthma/COPD Exacerbation  [ ] Pleural effusion  [ ] Aspiration pneumonia  -------------------------------------------------------------------  [  ]JIM     [  ]ATN     [  ]Reneal Medullary Necrosis     [  ]Renal Cortical Necrosis     [  ]Other Pathological Lesions:    [  ]CKD 1  [  ]CKD 2  [  ]CKD 3  [  ]CKD 4  [X  ]CKD 5  [  ]Other  -------------------------------------------------------------------  [  ]Diabetes  [  ] Diabetic PVD Ulcer  [  ] Neuropathic ulcer to DM  [  ] Diabetes with Nephropathy  [  ] Osteomyelitis due to diabetes  --------------------------------------------------------------------  [  ]Malnutrition: See Nutrition Note  [  ]Cachexia  [  ]Other:   [  ]Supplement Ordered:  [  ]Morbid Obesity (BMI >=40]  ---------------------------------------------------------------------  [ ] Sepsis/severe sepsis/septic shock  [ ] UTI  [ ] Pneumonia  -----------------------------------------------------------------------  [ ] Acidosis/alkalosis  [ ] Fluid overload  [ ] Hypokalemia  [ ] Hyperkalemia  [ ] Hypomagnesemia  [ ] Hypophosphatemia  [ ] Hyperphosphatemia  ------------------------------------------------------------------------  [ ] Acute blood loss anemia  [ ] Post op blood loss anemia  [ ] Iron deficiency anemia  [X ] Anemia due to chronic disease  [ ] Hypercoagulable state  ----------------------------------------------------------------------  [ ] Cerebral infarction  [ ] Transient ischemia attack  [ ] Encephalopathy

## 2019-10-07 NOTE — PROGRESS NOTE ADULT - ASSESSMENT
48M with PMH of HTN, ESRD (TuThuSat), recent PE after fistula declot, currently on Eliquis, presents with 1 week of ankle swelling and pain, being evaluated for compartment syndrome, CT finding show large L ankle hematoma, now s/p incision & drainage for evacuation of hematoma on 10/6/2019    - Hold Eliquis   - Regular Diet  - IV Ancef q24  - Dialysis TTS  - Pain/Nausea Control  - f/u Blood cultures & UA  - f/u AM labs

## 2019-10-07 NOTE — DISCHARGE NOTE PROVIDER - HOSPITAL COURSE
48M with PMH of HTN, ESRD (TuThuSat), recent PE after fistula declot, currently on Eliquis (last dose Sat AM), presents with 1 week of ankle swelling and pain. Patient states he had been doing well since discharge, consistently taking his Eliquis. He sprained his ankle last week playing basketball. He went to an OSH ED and was told there was no fracture, just a small hematoma. He was discharged with instructions for compression. Earlier today after dialysis, he took down his compression dressing and noticed more swelling around his ankle, and noted a popped blister. It was tender to touch and painful to walk, so he came to the ED for evaluation. Denies CP/SOB, denies N/V.        During hospital course patient was started on Ancef.  Then had bedside I&D of hematoma.  Overnight had temp of 101.1 fever work up negative.  On day of discharge patient was stable to be d/c'd home on augmentin x1wk and holding eliquis for 1wk. 48M with PMH of HTN, ESRD (TuThuSat), recent PE after fistula declot, currently on Eliquis (last dose Sat AM), presents with 1 week of ankle swelling and pain. Patient states he had been doing well since discharge, consistently taking his Eliquis. He sprained his ankle last week playing basketball. He went to an OSH ED and was told there was no fracture, just a small hematoma. He was discharged with instructions for compression. Earlier today after dialysis, he took down his compression dressing and noticed more swelling around his ankle, and noted a popped blister. It was tender to touch and painful to walk, so he came to the ED for evaluation. Denies CP/SOB, denies N/V.        During hospital course patient was started on Ancef.  Then had bedside I&D of hematoma.  Overnight had temp of 101.1 fever work up negative.  On day of discharge patient was stable to be d/c'd home on augmentin x1wk and holding eliquis for 2days.

## 2019-10-07 NOTE — DISCHARGE NOTE PROVIDER - NSDCACTIVITY_GEN_ALL_CORE
Stairs allowed/Showering allowed/Walking - Indoors allowed/No heavy lifting/straining/Do not drive or operate machinery/Walking - Outdoors allowed

## 2019-10-07 NOTE — DISCHARGE NOTE NURSING/CASE MANAGEMENT/SOCIAL WORK - PATIENT PORTAL LINK FT
You can access the FollowMyHealth Patient Portal offered by [facility] by registering at the following website: [email or web address]. By joining [employer]’s FollowMyHealth portal, you will also be able to view your health information using other applications (apps) compatible with our system.

## 2019-10-07 NOTE — DISCHARGE NOTE PROVIDER - CARE PROVIDER_API CALL
Salvador Lackey)  Vascular Surgery  [address]  [place], [place] [address]  Phone: [phone]  Fax: [phone]  Follow Up Time:

## 2019-10-07 NOTE — DISCHARGE NOTE PROVIDER - NSDCFUADDINST_GEN_ALL_CORE_FT
Follow-up with Dr. Lackey in 2 weeks in office at [phone].     Wound care: Remove dressings to shower with soap and water. Dry well. Dampen gauze with saline solution. Place on or into wound. Cover with dry gauze and Kerlix and ACE wrap daily.   Do NOT bathe or swim until cleared by your doctor.    Ambulation: as tolerated     Medications: Hold eliquis for 1 week  antibiotic- Augmentin for 1 week     Please call your doctor if you have a fever of over 101.9 or swelling/bleeding at wound. Follow-up with Dr. Lackey in 2 weeks in office at [phone].     Wound care: Remove dressings to shower with soap and water. Dry well. Dampen gauze with saline solution. Place on or into wound. Cover with dry gauze and Kerlix and ACE wrap daily.   Do NOT bathe or swim until cleared by your doctor.    Ambulation: as tolerated     Medications: Hold eliquis for 2days restart on Wed 10/9  antibiotic- Augmentin for 1 week     Please call your doctor if you have a fever of over 101.9 or swelling/bleeding at wound. Follow-up with Dr. Lackey in 1 weeks in office at [phone].     Wound care: Remove dressings to shower with soap and water. Dry well. Dampen tip of kerlix gauze with saline solution. Place on or into wound. Cover with Kerlix and ACE wrap daily.   Do NOT bathe or swim until cleared by your doctor.  - ALL supplies provided  Ambulation: as tolerated     Medications: Hold eliquis for 2days restart on Wed 10/9  antibiotic- Augmentin for 1 week     Please call your doctor if you have a fever of over 101.9 or swelling/bleeding at wound. Follow-up with Dr. Lackey in 1 weeks in office at [phone].     Wound care: Remove dressings to shower with soap and water. Dry well. Dampen tip of kerlix gauze with saline solution. Place on or into wound. Cover with Kerlix and ACE wrap daily.   Do NOT bathe or swim until cleared by your doctor.  - ALL supplies provided and taught patient how to do dressing changes  Ambulation: as tolerated     Medications: Hold eliquis for 2days restart on Wed 10/9  antibiotic- Augmentin for 1 week     Please call your doctor if you have a fever of over 101.9 or swelling/bleeding at wound.

## 2019-10-07 NOTE — DISCHARGE NOTE PROVIDER - NSDCCPCAREPLAN_GEN_ALL_CORE_FT
PRINCIPAL DISCHARGE DIAGNOSIS  Diagnosis: Hematoma  Assessment and Plan of Treatment:       SECONDARY DISCHARGE DIAGNOSES  Diagnosis: HTN (hypertension)  Assessment and Plan of Treatment:     Diagnosis: Pulmonary embolus  Assessment and Plan of Treatment:     Diagnosis: ESRD on dialysis  Assessment and Plan of Treatment:

## 2019-10-07 NOTE — PROGRESS NOTE ADULT - ATTENDING COMMENTS
Patient seen and examined    S/p drainage of hematoma of right ankle    Leg still swollen    Incision clean and dry. Minimal drainage.    Plan  Continue wound care  Home today  Hold a/c for 1 week  1 week of augmentin

## 2019-10-08 ENCOUNTER — INBOUND DOCUMENT (OUTPATIENT)
Age: 49
End: 2019-10-08

## 2019-10-09 DIAGNOSIS — I12.0 HYPERTENSIVE CHRONIC KIDNEY DISEASE WITH STAGE 5 CHRONIC KIDNEY DISEASE OR END STAGE RENAL DISEASE: ICD-10-CM
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
## 2019-10-09 DIAGNOSIS — Z86.711 PERSONAL HISTORY OF PULMONARY EMBOLISM: ICD-10-CM

## 2019-10-09 DIAGNOSIS — N18.6 END STAGE RENAL DISEASE: ICD-10-CM

## 2019-10-09 DIAGNOSIS — S90.02XA CONTUSION OF LEFT ANKLE, INITIAL ENCOUNTER: ICD-10-CM

## 2019-10-09 DIAGNOSIS — M25.572 PAIN IN LEFT ANKLE AND JOINTS OF LEFT FOOT: ICD-10-CM

## 2019-10-09 DIAGNOSIS — Z79.01 LONG TERM (CURRENT) USE OF ANTICOAGULANTS: ICD-10-CM

## 2019-10-12 LAB
CULTURE RESULTS: SIGNIFICANT CHANGE UP
SPECIMEN SOURCE: SIGNIFICANT CHANGE UP

## 2019-10-14 ENCOUNTER — EMERGENCY (EMERGENCY)
Facility: HOSPITAL | Age: 49
LOS: 1 days | Discharge: HOME | End: 2019-10-14
Attending: EMERGENCY MEDICINE | Admitting: EMERGENCY MEDICINE
Payer: COMMERCIAL

## 2019-10-14 ENCOUNTER — APPOINTMENT (OUTPATIENT)
Dept: VASCULAR SURGERY | Facility: CLINIC | Age: 49
End: 2019-10-14

## 2019-10-14 VITALS
RESPIRATION RATE: 17 BRPM | DIASTOLIC BLOOD PRESSURE: 81 MMHG | SYSTOLIC BLOOD PRESSURE: 136 MMHG | TEMPERATURE: 98 F | OXYGEN SATURATION: 98 % | HEART RATE: 79 BPM

## 2019-10-14 VITALS
RESPIRATION RATE: 16 BRPM | DIASTOLIC BLOOD PRESSURE: 79 MMHG | SYSTOLIC BLOOD PRESSURE: 149 MMHG | OXYGEN SATURATION: 100 % | HEART RATE: 82 BPM | TEMPERATURE: 98 F

## 2019-10-14 DIAGNOSIS — I77.0 ARTERIOVENOUS FISTULA, ACQUIRED: Chronic | ICD-10-CM

## 2019-10-14 LAB
ALBUMIN SERPL ELPH-MCNC: 3.6 G/DL — SIGNIFICANT CHANGE UP (ref 3.3–5)
ALP SERPL-CCNC: 51 U/L — SIGNIFICANT CHANGE UP (ref 40–120)
ALT FLD-CCNC: 6 U/L — LOW (ref 10–45)
ANION GAP SERPL CALC-SCNC: 15 MMOL/L — SIGNIFICANT CHANGE UP (ref 5–17)
AST SERPL-CCNC: 18 U/L — SIGNIFICANT CHANGE UP (ref 10–40)
BASOPHILS # BLD AUTO: 0.05 K/UL — SIGNIFICANT CHANGE UP (ref 0–0.2)
BASOPHILS NFR BLD AUTO: 0.6 % — SIGNIFICANT CHANGE UP (ref 0–2)
BILIRUB SERPL-MCNC: 0.2 MG/DL — SIGNIFICANT CHANGE UP (ref 0.2–1.2)
BUN SERPL-MCNC: 91 MG/DL — HIGH (ref 7–23)
CALCIUM SERPL-MCNC: 9.7 MG/DL — SIGNIFICANT CHANGE UP (ref 8.4–10.5)
CHLORIDE SERPL-SCNC: 107 MMOL/L — SIGNIFICANT CHANGE UP (ref 96–108)
CO2 SERPL-SCNC: 20 MMOL/L — LOW (ref 22–31)
CREAT SERPL-MCNC: 11.92 MG/DL — HIGH (ref 0.5–1.3)
EOSINOPHIL # BLD AUTO: 0.99 K/UL — HIGH (ref 0–0.5)
EOSINOPHIL NFR BLD AUTO: 11.1 % — HIGH (ref 0–6)
GLUCOSE SERPL-MCNC: 79 MG/DL — SIGNIFICANT CHANGE UP (ref 70–99)
HCT VFR BLD CALC: 25.8 % — LOW (ref 39–50)
HGB BLD-MCNC: 8 G/DL — LOW (ref 13–17)
IMM GRANULOCYTES NFR BLD AUTO: 0.8 % — SIGNIFICANT CHANGE UP (ref 0–1.5)
LYMPHOCYTES # BLD AUTO: 0.88 K/UL — LOW (ref 1–3.3)
LYMPHOCYTES # BLD AUTO: 9.8 % — LOW (ref 13–44)
MCHC RBC-ENTMCNC: 28.1 PG — SIGNIFICANT CHANGE UP (ref 27–34)
MCHC RBC-ENTMCNC: 31 GM/DL — LOW (ref 32–36)
MCV RBC AUTO: 90.5 FL — SIGNIFICANT CHANGE UP (ref 80–100)
MONOCYTES # BLD AUTO: 0.75 K/UL — SIGNIFICANT CHANGE UP (ref 0–0.9)
MONOCYTES NFR BLD AUTO: 8.4 % — SIGNIFICANT CHANGE UP (ref 2–14)
NEUTROPHILS # BLD AUTO: 6.21 K/UL — SIGNIFICANT CHANGE UP (ref 1.8–7.4)
NEUTROPHILS NFR BLD AUTO: 69.3 % — SIGNIFICANT CHANGE UP (ref 43–77)
NRBC # BLD: 0 /100 WBCS — SIGNIFICANT CHANGE UP (ref 0–0)
PLATELET # BLD AUTO: 331 K/UL — SIGNIFICANT CHANGE UP (ref 150–400)
POTASSIUM SERPL-MCNC: 5.7 MMOL/L — HIGH (ref 3.5–5.3)
POTASSIUM SERPL-SCNC: 5.7 MMOL/L — HIGH (ref 3.5–5.3)
PROT SERPL-MCNC: 8.2 G/DL — SIGNIFICANT CHANGE UP (ref 6–8.3)
RBC # BLD: 2.85 M/UL — LOW (ref 4.2–5.8)
RBC # FLD: 14.7 % — HIGH (ref 10.3–14.5)
SODIUM SERPL-SCNC: 142 MMOL/L — SIGNIFICANT CHANGE UP (ref 135–145)
WBC # BLD: 8.95 K/UL — SIGNIFICANT CHANGE UP (ref 3.8–10.5)
WBC # FLD AUTO: 8.95 K/UL — SIGNIFICANT CHANGE UP (ref 3.8–10.5)

## 2019-10-14 PROCEDURE — 96375 TX/PRO/DX INJ NEW DRUG ADDON: CPT

## 2019-10-14 PROCEDURE — 96374 THER/PROPH/DIAG INJ IV PUSH: CPT

## 2019-10-14 PROCEDURE — 80053 COMPREHEN METABOLIC PANEL: CPT

## 2019-10-14 PROCEDURE — 36415 COLL VENOUS BLD VENIPUNCTURE: CPT

## 2019-10-14 PROCEDURE — 99243 OFF/OP CNSLTJ NEW/EST LOW 30: CPT | Mod: 24,GC

## 2019-10-14 PROCEDURE — 93005 ELECTROCARDIOGRAM TRACING: CPT

## 2019-10-14 PROCEDURE — 82962 GLUCOSE BLOOD TEST: CPT

## 2019-10-14 PROCEDURE — 85025 COMPLETE CBC W/AUTO DIFF WBC: CPT

## 2019-10-14 PROCEDURE — 99284 EMERGENCY DEPT VISIT MOD MDM: CPT

## 2019-10-14 PROCEDURE — 93010 ELECTROCARDIOGRAM REPORT: CPT

## 2019-10-14 PROCEDURE — 99284 EMERGENCY DEPT VISIT MOD MDM: CPT | Mod: 25

## 2019-10-14 RX ORDER — DEXTROSE 50 % IN WATER 50 %
15 SYRINGE (ML) INTRAVENOUS ONCE
Refills: 0 | Status: COMPLETED | OUTPATIENT
Start: 2019-10-14 | End: 2019-10-14

## 2019-10-14 RX ORDER — INSULIN HUMAN 100 [IU]/ML
10 INJECTION, SOLUTION SUBCUTANEOUS ONCE
Refills: 0 | Status: COMPLETED | OUTPATIENT
Start: 2019-10-14 | End: 2019-10-14

## 2019-10-14 RX ORDER — DEXTROSE 50 % IN WATER 50 %
50 SYRINGE (ML) INTRAVENOUS ONCE
Refills: 0 | Status: COMPLETED | OUTPATIENT
Start: 2019-10-14 | End: 2019-10-14

## 2019-10-14 RX ADMIN — INSULIN HUMAN 10 UNIT(S): 100 INJECTION, SOLUTION SUBCUTANEOUS at 13:47

## 2019-10-14 RX ADMIN — Medication 50 MILLILITER(S): at 13:47

## 2019-10-14 RX ADMIN — Medication 15 GRAM(S): at 15:20

## 2019-10-14 NOTE — ED ADULT NURSE REASSESSMENT NOTE - NS ED NURSE REASSESS COMMENT FT1
Patient had an episode complained of dizziness and weakness, noted to be diaphoretic, no chest pain or SOB.  POC glucose noted to be 40.  Glucose stick 15gm X 2, cups of orange juice X 5 administered immediately and pOC glucose checked regularly to improvement, latest POC glucose improved to 61, patient stated feeling better.  Will continue to monitor blood glucose.  MARYAN Avilez made aware.

## 2019-10-14 NOTE — ED ADULT NURSE NOTE - OBJECTIVE STATEMENT
Patient c/o of left foot pain, s/p wound/ blister  debridement and hematoma evacuation done on 10/6, states has had wound dressing X 2 since then, states went to [facility] today for wound dressing/change, told to go to ED.  Able to bear weight and ambulate w/ limping gait, denies any fevers or chills or bleed or discharge from site.  Left lower leg noted to be swollen,  dry,   toe nails poor cap refill, dried scab on top of foot under 2nd and 3rd digit, w/ large ulcer noted on outer aspect of left foot, ankle area measuring 5X5X 2 cm w/ minimal sero-sanguinous discharge, no foul smelling discharge, w/ pain and tenderness on palpation, unable to palpate pedal pulses.

## 2019-10-14 NOTE — ED PROVIDER NOTE - CLINICAL SUMMARY MEDICAL DECISION MAKING FREE TEXT BOX
47 y/o m hx ESRD (tu, th, sa), PE on eliquis, HTN presents for evaluation of left ankle ulcer.  Pt admitted to vascular last week for this issue, on abx currently.  Wound appears to be healing with no evidence of superinfection.  Vascular evaluated in ED and stable for d/c with wound care f/u.  Potassium noted to be 5.7, given insulin/glucose, no EKG changes.  Pt will d/c to f/u in vascular with Dr. Lackey.

## 2019-10-14 NOTE — ED PROVIDER NOTE - ATTENDING CONTRIBUTION TO CARE
pt w ESRD on dialysis, PE on eliquis, HTN, recent infected hematoma to L ankle , seen by vascular and admitted on IV antibiotics , had dressing change at [facility] , advised to come for wound check, no worsening pain but uncomfortable, deep ulceration on Lateral ankle, no pus, no apparent surrounding erythema , tender but no worse than usual, improved from admission , will call vascular for wound evaluation, pulses intact, no elevation in WBC. dispo pending vascular evaluation.

## 2019-10-14 NOTE — CONSULT NOTE ADULT - ATTENDING COMMENTS
Case reviewed with the Chief Resident    I agree with the statements and findings as documented above    Will follow in the office

## 2019-10-14 NOTE — ED ADULT NURSE NOTE - NSIMPLEMENTINTERV_GEN_ALL_ED
Implemented All Universal Safety Interventions:  [place] to call system. Call bell, personal items and telephone within reach. Instruct patient to call for assistance. Room bathroom lighting operational. Non-slip footwear when patient is off stretcher. Physically safe environment: no spills, clutter or unnecessary equipment. Stretcher in lowest position, wheels locked, appropriate side rails in place.

## 2019-10-14 NOTE — CONSULT NOTE ADULT - ASSESSMENT
48 M s/p hematoma evacuation on left lateral malleolus on 10/7/19 presents for wound check. On exam there is no purulent discharge from wound, no bleeding, gently debrided at bedside.    Recommendations:    Wound care with saline WTD 48 M s/p hematoma evacuation on left lateral malleolus on 10/7/19 presents for wound check. On exam there is no purulent discharge from wound, no bleeding, Non viable skin debrided at bedside.    Recommendations:    Augmentin and PO doxycycline for one week  Set up home wound vac, if not possible,  Wound care instructions: Wash with betadine and peroxide and pack with tip of Kerlix soaked with normal saline, cover with ACE wrap. Change dressing daily.  Follow up with Dr. Lackey in one week  Return to the ED if any concerns for wound infection including fevers, nausea, vomiting

## 2019-10-14 NOTE — ED PROVIDER NOTE - PATIENT PORTAL LINK FT
You can access the FollowMyHealth Patient Portal offered by [facility] by registering at the following website: [email or web address]. By joining [employer]s FollowMyHealth portal, you will also be able to view your health information using other applications (apps) compatible with our system.

## 2019-10-14 NOTE — ED ADULT NURSE REASSESSMENT NOTE - NS ED NURSE REASSESS COMMENT FT1
Patient a/oX 3, c/o of left ankle pain.   Left lower leg noted to be swollen,  dry,   toe nails poor cap refill, dried scab on top of foot under 2nd and 3rd digit, w/ large ulcer noted on outer aspect of left foot, ankle area measuring 5X5X 2 cm w/ minimal sero-sanguinous discharge, no foul smelling discharge, w/ pain and tenderness on palpation, unable to palpate pedal pulses.  New dressing applied by MARYAN Avilez.

## 2019-10-14 NOTE — ED ADULT NURSE REASSESSMENT NOTE - NS ED NURSE REASSESS COMMENT FT1
Patient a/oX 3, all tests resulted, POC glucose 146, states feeling better, no dizziness or any other symptoms complaint, vital signs stable, food and fluids PO tolerated well.  Discharged to home in stable condition.

## 2019-10-14 NOTE — ED ADULT TRIAGE NOTE - ARRIVAL INFO ADDITIONAL COMMENTS
Pt walked into ED with c/o of a referal to debride "dead skin and discolored skin" of the open wound. He states last week he injured his foot, they drained a hemotoma and then he was told to come in for debridement. Denies fever, chills, drainage/ purulent. hx of dialysis (T, th, S). Is currently on Amoxicillin.

## 2019-10-14 NOTE — ED PROVIDER NOTE - CARE PROVIDER_API CALL
Salvador Lackey)  Vascular Surgery  [address]  [place], [place] [address]  Phone: [phone]  Fax: [phone]  Follow Up Time:

## 2019-10-14 NOTE — ED ADULT NURSE REASSESSMENT NOTE - NS ED NURSE REASSESS COMMENT FT1
Patient a/oX 3, no new pain or other symptom complaint, NSR on EKG, vital signs stable.  Elevated K+ 5.7 ;  administered REgular insulin 10 units IVP and Dextroxe 50% IVP.  Will continue to monitor.

## 2019-10-14 NOTE — CONSULT NOTE ADULT - SUBJECTIVE AND OBJECTIVE BOX
48 M with PMH as noted below significant for PE on eliquis, open wound s/p hematoma incision and drainage on left lateral maleollus on 10/7 presents for wound check. He was recommended by a facility to visit [facility] ED considering the appearance of his wound. He denies any fevers, significant bleeding, discharge from wound.    General: No acute distress  Neurology: Awake  Eyes: Scleras clear  Respiratory: Normal work of breathing  CV: RRR  Abdominal: Soft, NT  Extremities: No edema, b/l femoral 2+, dp2+, left popliteal triphasic signals, edema in B/L calves, left lateral malleolus with open wound, no purulent discharge on palpation, no signs of cellulitis  Psych: Oriented    Vital Signs Last 24 Hrs  T(C): 36.6 (14 Oct 2019 13:31), Max: 36.6 (14 Oct 2019 11:54)  T(F): 97.9 (14 Oct 2019 13:31), Max: 97.9 (14 Oct 2019 11:54)  HR: 90 (14 Oct 2019 13:31) (82 - 90)  BP: 156/78 (14 Oct 2019 13:31) (149/79 - 156/78)  BP(mean): --  RR: 16 (14 Oct 2019 13:31) (16 - 16)  SpO2: 98% (14 Oct 2019 13:31) (98% - 100%)    LABS/RADIOLOGY RESULTS:                          8.0    8.95  )-----------( 331      ( 14 Oct 2019 12:44 )             25.8   10-14    142  |  107  |  91<H>  ----------------------------<  79  5.7<H>   |  20<L>  |  11.92<H>    Ca    9.7      14 Oct 2019 12:44    TPro  8.2  /  Alb  3.6  /  TBili  0.2  /  DBili  x   /  AST  18  /  ALT  6<L>  /  AlkPhos  51  10-14  Blood Cultures    PAST MEDICAL & SURGICAL HISTORY:  ESRD (end stage renal disease) on dialysis  AV fistula 48 M with PMH as noted below significant for PE on eliquis, open wound s/p hematoma incision and drainage on left lateral maleollus on 10/7 presents for wound check. He was recommended by a facility to visit [facility] ED considering the appearance of his wound. He denies any fevers, significant bleeding, discharge from wound. He is taking Augmentin    General: No acute distress  Neurology: Awake  Eyes: Scleras clear  Respiratory: Normal work of breathing  CV: RRR  Abdominal: Soft, NT  Extremities: No edema, b/l femoral 2+, dp2+, left popliteal triphasic signals, edema in B/L calves, left lateral malleolus with open wound, no purulent discharge on palpation, no signs of cellulitis  Psych: Oriented    Vital Signs Last 24 Hrs  T(C): 36.6 (14 Oct 2019 13:31), Max: 36.6 (14 Oct 2019 11:54)  T(F): 97.9 (14 Oct 2019 13:31), Max: 97.9 (14 Oct 2019 11:54)  HR: 90 (14 Oct 2019 13:31) (82 - 90)  BP: 156/78 (14 Oct 2019 13:31) (149/79 - 156/78)  BP(mean): --  RR: 16 (14 Oct 2019 13:31) (16 - 16)  SpO2: 98% (14 Oct 2019 13:31) (98% - 100%)    LABS/RADIOLOGY RESULTS:                          8.0    8.95  )-----------( 331      ( 14 Oct 2019 12:44 )             25.8   10-14    142  |  107  |  91<H>  ----------------------------<  79  5.7<H>   |  20<L>  |  11.92<H>    Ca    9.7      14 Oct 2019 12:44    TPro  8.2  /  Alb  3.6  /  TBili  0.2  /  DBili  x   /  AST  18  /  ALT  6<L>  /  AlkPhos  51  10-14  Blood Cultures    PAST MEDICAL & SURGICAL HISTORY:  ESRD (end stage renal disease) on dialysis  AV fistula 48 M with PMH as noted below significant for PE on eliquis, open wound s/p hematoma incision and drainage on left lateral maleollus on 10/7 presents for wound check. He was recommended by a facility to visit [facility] ED considering the appearance of his wound. He denies any fevers, significant bleeding, discharge from wound. He is taking Augmentin as prescribed    General: No acute distress  Neurology: Awake  Eyes: Scleras clear  Respiratory: Normal work of breathing  CV: RRR  Abdominal: Soft, NT  Extremities: No edema, b/l femoral 2+, dp2+, left popliteal triphasic signals, edema in B/L calves, left lateral malleolus with open wound, no purulent discharge on palpation, no signs of cellulitis  Psych: Oriented    Vital Signs Last 24 Hrs  T(C): 36.6 (14 Oct 2019 13:31), Max: 36.6 (14 Oct 2019 11:54)  T(F): 97.9 (14 Oct 2019 13:31), Max: 97.9 (14 Oct 2019 11:54)  HR: 90 (14 Oct 2019 13:31) (82 - 90)  BP: 156/78 (14 Oct 2019 13:31) (149/79 - 156/78)  BP(mean): --  RR: 16 (14 Oct 2019 13:31) (16 - 16)  SpO2: 98% (14 Oct 2019 13:31) (98% - 100%)    LABS/RADIOLOGY RESULTS:                          8.0    8.95  )-----------( 331      ( 14 Oct 2019 12:44 )             25.8   10-14    142  |  107  |  91<H>  ----------------------------<  79  5.7<H>   |  20<L>  |  11.92<H>    Ca    9.7      14 Oct 2019 12:44    TPro  8.2  /  Alb  3.6  /  TBili  0.2  /  DBili  x   /  AST  18  /  ALT  6<L>  /  AlkPhos  51  10-14  Blood Cultures    PAST MEDICAL & SURGICAL HISTORY:  ESRD (end stage renal disease) on dialysis  AV fistula

## 2019-10-14 NOTE — ED PROVIDER NOTE - OBJECTIVE STATEMENT
47 y/o M with pmhx of HTN, ESRD on HD (T,Th, Sat), PE on Eliquis went to [facility] today for wound check and dressing change of L ankle. Pt admitted to vascular surgery x1w for infected hematoma on L ankle and has I&D and on Augmentin. Report pain is improved. Denies fever or discharge from wound site. Has not f/u with vascular surgeon. 49 y/o M with pmhx of HTN, ESRD on HD (T,Th, Sat), PE on Eliquis presents for evaluation of left ankle wound.  Pt went to [facility] today for wound check and dressing change of L ankle and was sent to ED for further evaluation. Pt admitted to vascular surgery 1 week ago for infected hematoma on L ankle, s/p I&D on Augmentin. Reports pain has improved. Denies fever or discharge from wound site. Has not f/u with vascular surgeon.

## 2019-10-15 ENCOUNTER — INBOUND DOCUMENT (OUTPATIENT)
Age: 49
End: 2019-10-15

## 2019-10-15 ENCOUNTER — EMERGENCY (EMERGENCY)
Facility: HOSPITAL | Age: 49
LOS: 1 days | Discharge: HOME | End: 2019-10-15
Attending: EMERGENCY MEDICINE | Admitting: EMERGENCY MEDICINE
Payer: COMMERCIAL

## 2019-10-15 VITALS
RESPIRATION RATE: 16 BRPM | TEMPERATURE: 99 F | SYSTOLIC BLOOD PRESSURE: 147 MMHG | DIASTOLIC BLOOD PRESSURE: 81 MMHG | HEART RATE: 83 BPM | OXYGEN SATURATION: 97 %

## 2019-10-15 VITALS
TEMPERATURE: 98 F | WEIGHT: 214.73 LBS | RESPIRATION RATE: 16 BRPM | OXYGEN SATURATION: 98 % | HEART RATE: 94 BPM | DIASTOLIC BLOOD PRESSURE: 84 MMHG | SYSTOLIC BLOOD PRESSURE: 152 MMHG
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
## 2019-10-15 DIAGNOSIS — S81.802D UNSPECIFIED OPEN WOUND, LEFT LOWER LEG, SUBSEQUENT ENCOUNTER: ICD-10-CM

## 2019-10-15 DIAGNOSIS — I77.0 ARTERIOVENOUS FISTULA, ACQUIRED: Chronic | ICD-10-CM

## 2019-10-15 PROCEDURE — 99282 EMERGENCY DEPT VISIT SF MDM: CPT

## 2019-10-15 RX ORDER — ACETAMINOPHEN 500 MG
2 TABLET ORAL
Qty: 30 | Refills: 0
Start: 2019-10-15

## 2019-10-15 RX ORDER — ACETAMINOPHEN 500 MG
975 TABLET ORAL ONCE
Refills: 0 | Status: COMPLETED | OUTPATIENT
Start: 2019-10-15 | End: 2019-10-15

## 2019-10-15 RX ADMIN — Medication 975 MILLIGRAM(S): at 22:52

## 2019-10-15 NOTE — ED PROVIDER NOTE - CLINICAL SUMMARY MEDICAL DECISION MAKING FREE TEXT BOX
No worsening of sx since yesterday, to continue wound dressings advised to patient, pt with supplies, to jacob Lackey in one week.

## 2019-10-15 NOTE — ED PROVIDER NOTE - CARE PROVIDER_API CALL
Salvador Lackey)  Vascular Surgery  [address]  [place], [place] [address]  Phone: [phone]  Fax: [phone]  Follow Up Time:

## 2019-10-15 NOTE — ED PROVIDER NOTE - PHYSICAL EXAMINATION
CONSTITUTIONAL: Well appearing, well nourished, awake, alert and in no apparent distress.  HEENT: Head is atraumatic. Eyes clear bilaterally, normal EOMI. Airway patent.  CARDIAC: Normal rate, regular rhythm.  Heart sounds S1, S2.   RESPIRATORY: Breath sounds clear and equal bilaterally. no tachypnea, respiratory distress.   GASTROINTESTINAL: Abdomen soft, non-tender, no guarding, distension.  MUSCULOSKELETAL: Spine appears normal, no midline spinal tenderness, range of motion is not limited, no muscle or joint tenderness. no bony tenderness. no JVD, peripheral edema. no increased drainage, bleeding purulent dc  NEUROLOGICAL: Alert and oriented, no focal deficits, no motor or sensory deficits.  SKIN: Skin normal color for race, warm, dry and intact. No evidence of rash.  PSYCHIATRIC: Alert and oriented to person, place, time/situation. normal mood and affect. no apparent risk to self or others.

## 2019-10-15 NOTE — ED ADULT NURSE NOTE - CAS ELECT INFOMATION PROVIDED
Wound rinsed w/ betadine, packed with saline soaked gauze, wrapped with kerlex and ace bandage. follow up w/ vascular MD young/ALLEGRA instructions

## 2019-10-15 NOTE — ED ADULT TRIAGE NOTE - CHIEF COMPLAINT QUOTE
Seen & evaluated here yesterday for wound debridement by Dr Lackey, told to come back to ED for wound check today.  Denies any complaints, just came from dialysis.

## 2019-10-15 NOTE — ED PROVIDER NOTE - OBJECTIVE STATEMENT
48 M  significant for PE on eliquis, open wound s/p hematoma incision and drainage on left lateral maleollus on 10/7 presents for wound check. He returns today for a wound check stating that he was told yesterday to come today again. However denies any fevers,  bleeding, increased discharge from wound. He is taking Augmentin as prescribed and completing daily dressings.

## 2019-10-15 NOTE — ED PROVIDER NOTE - PATIENT PORTAL LINK FT
You can access the FollowMyHealth Patient Portal offered by [facility] by registering at the following website: [email or web address]. By joining [facility]’s FollowMyHealth portal, you will also be able to view your health information using other applications (apps) compatible with our system.

## 2019-10-19 DIAGNOSIS — L97.329 NON-PRESSURE CHRONIC ULCER OF LEFT ANKLE WITH UNSPECIFIED SEVERITY: ICD-10-CM

## 2019-10-19 DIAGNOSIS — Z48.00 ENCOUNTER FOR CHANGE OR REMOVAL OF NONSURGICAL WOUND DRESSING: ICD-10-CM

## 2019-10-19 DIAGNOSIS — Z79.01 LONG TERM (CURRENT) USE OF ANTICOAGULANTS: ICD-10-CM

## 2019-10-19 DIAGNOSIS — N18.6 END STAGE RENAL DISEASE: ICD-10-CM

## 2019-10-19 DIAGNOSIS — Z99.2 DEPENDENCE ON RENAL DIALYSIS: ICD-10-CM

## 2019-10-19 DIAGNOSIS — I12.0 HYPERTENSIVE CHRONIC KIDNEY DISEASE WITH STAGE 5 CHRONIC KIDNEY DISEASE OR END STAGE RENAL DISEASE: ICD-10-CM

## 2019-10-21 ENCOUNTER — INBOUND DOCUMENT (OUTPATIENT)
Age: 49
End: 2019-10-21

## 2019-10-22 ENCOUNTER — APPOINTMENT (OUTPATIENT)
Dept: VASCULAR SURGERY | Facility: CLINIC | Age: 49
End: 2019-10-22
Payer: MEDICAID

## 2019-10-22 PROCEDURE — 99024 POSTOP FOLLOW-UP VISIT: CPT

## 2019-10-25 NOTE — PHYSICAL EXAM
[Normal Heart Sounds] : normal heart sounds [Respiratory Effort] : normal respiratory effort [2+] : left 2+ [Ankle Swelling Bilaterally] : severe [Ankle Swelling On The Left] : of the left ankle [Ankle Swelling (On Exam)] : present [Calm] : calm [de-identified] : well appearing [de-identified] : large ulcer measuring [id number] with serous drainage, no smell

## 2019-10-25 NOTE — ASSESSMENT
[FreeTextEntry1] : 48 year old male s/p hematoma evacuation in the ED on 10/6/19. He is going to urgent care daily to get wound dressing changed. Will call around and try and get him into wound care clinic that will take him insurance in [place] where he gets dialysis. If unable to find a place will transition him to UNNA boots next week.  [Arterial/Venous Disease] : arterial/venous disease

## 2019-10-25 NOTE — HISTORY OF PRESENT ILLNESS
[FreeTextEntry1] : 49 yo old homeless male with PMH of ESRD currently on dialysis presenting to the office for FU wound. He came to the ED on 10/6/19 with large hematoma after playing basketball (was on Eliquis) and he had I&D done in the ED, he was discharged home on oral Augmentin. He has been getting daily or every other day dressing changes at an urgent care, states he is unable to change dressing by himself. He complains of a lot of pain in the leg.

## 2019-10-29 ENCOUNTER — APPOINTMENT (OUTPATIENT)
Dept: VASCULAR SURGERY | Facility: CLINIC | Age: 49
End: 2019-10-29
Payer: MEDICAID

## 2019-10-29 PROCEDURE — 29580 STRAPPING UNNA BOOT: CPT | Mod: 79,LT

## 2019-10-29 PROCEDURE — 99213 OFFICE O/P EST LOW 20 MIN: CPT | Mod: 24,25

## 2019-10-30 NOTE — PHYSICAL EXAM
[Respiratory Effort] : normal respiratory effort [Normal Heart Sounds] : normal heart sounds [2+] : left 2+ [Ankle Swelling (On Exam)] : present [Ankle Swelling On The Left] : of the left ankle [Ankle Swelling Bilaterally] : severe [Calm] : calm [de-identified] : well appearing [de-identified] : large ulcer measuring [id number] with serous drainage, no foul smell from wound

## 2019-10-30 NOTE — HISTORY OF PRESENT ILLNESS
[FreeTextEntry1] : 49 yo old homeless male with PMH of ESRD currently on dialysis presenting to the office for FU wound. He came to the ED on 10/6/19 with large hematoma after playing basketball (was on Eliquis) and he had I&D done in the ED, he was discharged home on oral Augmentin which he has finished. He has been getting daily or every other day dressing changes at an urgent care, states he is unable to change dressing by himself. Using crutches for stability.

## 2019-10-30 NOTE — ASSESSMENT
[FreeTextEntry1] : 48 year old male s/p hematoma evacuation in the ED on 10/6/19. Will transition to UNNA boot therapy weekly. Instructions given to patient. Will most likely need dressings every week for at least 8-10 weeks. Patient understands. FU in one week.  [Arterial/Venous Disease] : arterial/venous disease

## 2019-10-31 PROCEDURE — 96374 THER/PROPH/DIAG INJ IV PUSH: CPT

## 2019-10-31 PROCEDURE — 83735 ASSAY OF MAGNESIUM: CPT

## 2019-10-31 PROCEDURE — 85610 PROTHROMBIN TIME: CPT

## 2019-10-31 PROCEDURE — 85027 COMPLETE CBC AUTOMATED: CPT

## 2019-10-31 PROCEDURE — 87040 BLOOD CULTURE FOR BACTERIA: CPT

## 2019-10-31 PROCEDURE — 99285 EMERGENCY DEPT VISIT HI MDM: CPT | Mod: 25

## 2019-10-31 PROCEDURE — 80048 BASIC METABOLIC PNL TOTAL CA: CPT

## 2019-10-31 PROCEDURE — 86900 BLOOD TYPING SEROLOGIC ABO: CPT

## 2019-10-31 PROCEDURE — 71045 X-RAY EXAM CHEST 1 VIEW: CPT

## 2019-10-31 PROCEDURE — 81001 URINALYSIS AUTO W/SCOPE: CPT

## 2019-10-31 PROCEDURE — 85025 COMPLETE CBC W/AUTO DIFF WBC: CPT

## 2019-10-31 PROCEDURE — 73610 X-RAY EXAM OF ANKLE: CPT

## 2019-10-31 PROCEDURE — 73700 CT LOWER EXTREMITY W/O DYE: CPT

## 2019-10-31 PROCEDURE — 36415 COLL VENOUS BLD VENIPUNCTURE: CPT

## 2019-10-31 PROCEDURE — 84100 ASSAY OF PHOSPHORUS: CPT

## 2019-10-31 PROCEDURE — 85730 THROMBOPLASTIN TIME PARTIAL: CPT

## 2019-10-31 PROCEDURE — 86901 BLOOD TYPING SEROLOGIC RH(D): CPT

## 2019-10-31 PROCEDURE — 86850 RBC ANTIBODY SCREEN: CPT

## 2019-11-05 ENCOUNTER — APPOINTMENT (OUTPATIENT)
Dept: VASCULAR SURGERY | Facility: CLINIC | Age: 49
End: 2019-11-05
Payer: MEDICAID

## 2019-11-05 PROCEDURE — 29580 STRAPPING UNNA BOOT: CPT | Mod: LT

## 2019-11-05 PROCEDURE — 99213 OFFICE O/P EST LOW 20 MIN: CPT | Mod: 25

## 2019-11-05 NOTE — HISTORY OF PRESENT ILLNESS
[FreeTextEntry1] : 47 yo old homeless male with PMH of ESRD currently on dialysis presenting to the office for FU wound. He came to the ED on 10/6/19 with large hematoma after playing basketball (was on Eliquis) and he had I&D done in the ED, he was discharged home on oral Augmentin which he has finished. last week we placed UNNA boot which he has tolerated well.

## 2019-11-11 PROBLEM — Z86.79 HISTORY OF HYPERTENSION: Status: ACTIVE | Noted: 2019-11-11

## 2019-11-12 ENCOUNTER — APPOINTMENT (OUTPATIENT)
Dept: VASCULAR SURGERY | Facility: CLINIC | Age: 49
End: 2019-11-12
Payer: MEDICAID

## 2019-11-12 DIAGNOSIS — Z86.79 PERSONAL HISTORY OF OTHER DISEASES OF THE CIRCULATORY SYSTEM: ICD-10-CM

## 2019-11-12 PROCEDURE — 99213 OFFICE O/P EST LOW 20 MIN: CPT | Mod: 25

## 2019-11-12 PROCEDURE — 29580 STRAPPING UNNA BOOT: CPT | Mod: LT

## 2019-11-13 NOTE — HISTORY OF PRESENT ILLNESS
[FreeTextEntry1] : 47 yo old homeless male with PMH of ESRD currently on dialysis presenting to the office for FU wound. He came to the ED on 10/6/19 with large hematoma after playing basketball (was on Eliquis) and he had I&D done in the ED, he was discharged home on oral Augmentin which he has finished. \par \par Tolerating UNNA boots since 10/29/19

## 2019-11-13 NOTE — ASSESSMENT
[Arterial/Venous Disease] : arterial/venous disease [FreeTextEntry1] : 48 year old male s/p hematoma evacuation in the ED on 10/6/19. Continue UNNA boot therapy weekly. Instructions given to patient, patient understands. FU in one week.

## 2019-11-13 NOTE — PHYSICAL EXAM
[Respiratory Effort] : normal respiratory effort [Normal Heart Sounds] : normal heart sounds [Ankle Swelling (On Exam)] : present [2+] : left 2+ [Ankle Swelling On The Left] : of the left ankle [Calm] : calm [Ankle Swelling Bilaterally] : severe [de-identified] : well appearing [de-identified] : large ulcer measuring [id number] with serous drainage, no foul smell from wound

## 2019-11-19 ENCOUNTER — APPOINTMENT (OUTPATIENT)
Dept: VASCULAR SURGERY | Facility: CLINIC | Age: 49
End: 2019-11-19
Payer: MEDICAID

## 2019-11-19 PROCEDURE — 29580 STRAPPING UNNA BOOT: CPT | Mod: LT

## 2019-11-19 PROCEDURE — 99212 OFFICE O/P EST SF 10 MIN: CPT | Mod: 25

## 2019-11-19 NOTE — HISTORY OF PRESENT ILLNESS
[FreeTextEntry1] : 47 yo old homeless male with PMH of ESRD currently on dialysis presenting to the office for FU wound. He came to the ED on 10/6/19 with large hematoma after playing basketball (was on Eliquis) and he had I&D done in the ED, we are wrapping the leg with UNNA boot. \par \par Tolerating UNNA boots since 10/29/19

## 2019-11-19 NOTE — ASSESSMENT
[FreeTextEntry1] : 48 year old male s/p hematoma evacuation in the ED on 10/6/19. Continue UNNA boot therapy weekly. Instructions given to patient, patient understands. FU in one week. Advised to take off the UNNA boot before next visit and take a shower.

## 2019-11-26 ENCOUNTER — APPOINTMENT (OUTPATIENT)
Dept: VASCULAR SURGERY | Facility: CLINIC | Age: 49
End: 2019-11-26
Payer: MEDICAID

## 2019-11-26 PROCEDURE — 29580 STRAPPING UNNA BOOT: CPT

## 2019-11-26 PROCEDURE — 99213 OFFICE O/P EST LOW 20 MIN: CPT | Mod: 25

## 2019-11-27 NOTE — ASSESSMENT
[FreeTextEntry1] : 48 year old male s/p hematoma evacuation in the ED on 10/6/19. Continue UNNA boot therapy weekly. Instructions given to patient, patient understands. FU in one week.

## 2019-11-27 NOTE — PHYSICAL EXAM
[Respiratory Effort] : normal respiratory effort [Normal Heart Sounds] : normal heart sounds [2+] : right 2+ [Ankle Swelling Bilaterally] : severe [Ankle Swelling On The Left] : of the left ankle [Ankle Swelling (On Exam)] : present [Calm] : calm [de-identified] : well appearing [de-identified] : large ulcer measuring [id number] with serous drainage.

## 2019-12-03 ENCOUNTER — APPOINTMENT (OUTPATIENT)
Dept: VASCULAR SURGERY | Facility: CLINIC | Age: 49
End: 2019-12-03
Payer: MEDICAID

## 2019-12-03 PROCEDURE — 99213 OFFICE O/P EST LOW 20 MIN: CPT | Mod: 25

## 2019-12-03 PROCEDURE — 29580 STRAPPING UNNA BOOT: CPT | Mod: LT

## 2019-12-04 NOTE — PHYSICAL EXAM
[de-identified] : well appearing [de-identified] : large ulcer measuring [id number] with serous drainage.

## 2019-12-04 NOTE — HISTORY OF PRESENT ILLNESS
[FreeTextEntry1] : 47 yo old homeless male with PMH of ESRD currently on dialysis presenting to the office for FU wound. He came to the ED on 10/6/19 with large hematoma after playing basketball (was on Eliquis) and he had I&D done in the ED. \par \par Tolerating UNNA boots since 10/29/19, wound getting smaller. He took his UNNA boot off last night and wrapped with ACE bandage, some bleeding but stopped with pressure

## 2019-12-10 ENCOUNTER — APPOINTMENT (OUTPATIENT)
Dept: VASCULAR SURGERY | Facility: CLINIC | Age: 49
End: 2019-12-10
Payer: MEDICAID

## 2019-12-10 PROCEDURE — 29580 STRAPPING UNNA BOOT: CPT | Mod: LT

## 2019-12-10 PROCEDURE — 99213 OFFICE O/P EST LOW 20 MIN: CPT | Mod: 25

## 2019-12-11 NOTE — PHYSICAL EXAM
[Respiratory Effort] : normal respiratory effort [Normal Heart Sounds] : normal heart sounds [Ankle Swelling On The Left] : of the left ankle [2+] : left 2+ [Ankle Swelling (On Exam)] : present [Calm] : calm [Ankle Swelling Bilaterally] : severe [de-identified] : well appearing [de-identified] : large ulcer measuring [id number] with serous drainage.

## 2019-12-11 NOTE — ASSESSMENT
[Arterial/Venous Disease] : arterial/venous disease [FreeTextEntry1] : 48 year old male s/p hematoma evacuation in the ED on 10/6/19. Continue UNNA boot therapy weekly. Instructions given to patient, patient understands. continues to improve every week. FU in one week.

## 2019-12-17 ENCOUNTER — APPOINTMENT (OUTPATIENT)
Dept: VASCULAR SURGERY | Facility: CLINIC | Age: 49
End: 2019-12-17
Payer: MEDICAID

## 2019-12-17 PROCEDURE — 29580 STRAPPING UNNA BOOT: CPT | Mod: LT

## 2019-12-17 PROCEDURE — 99213 OFFICE O/P EST LOW 20 MIN: CPT | Mod: 25

## 2019-12-18 NOTE — HISTORY OF PRESENT ILLNESS
[FreeTextEntry1] : 49 yo old homeless male with PMH of ESRD currently on dialysis presenting to the office for FU wound. He came to the ED on 10/6/19 with large hematoma after playing basketball (was on Eliquis) and he had I&D done in the ED. \par \par Tolerating UNNA boots since 10/29/19, wound getting smaller. He took his UNNA boot off last night and wrapped with ACE bandage, some bleeding but stopped with pressure

## 2019-12-18 NOTE — ASSESSMENT
[FreeTextEntry1] : 48 year old male s/p hematoma evacuation in the ED on 10/6/19. Continue UNNA boot therapy weekly. Instructions given to patient, patient understands. continues to improve every week. FU in one week, will remove on Tuesday and come on Thursday due to the holidays.  [Arterial/Venous Disease] : arterial/venous disease

## 2019-12-18 NOTE — PHYSICAL EXAM
[Respiratory Effort] : normal respiratory effort [Normal Heart Sounds] : normal heart sounds [2+] : left 2+ [Ankle Swelling (On Exam)] : present [Ankle Swelling On The Left] : of the left ankle [Ankle Swelling Bilaterally] : severe [Calm] : calm [de-identified] : well appearing [de-identified] : large ulcer measuring [id number] with serous drainage.

## 2019-12-26 ENCOUNTER — APPOINTMENT (OUTPATIENT)
Dept: VASCULAR SURGERY | Facility: CLINIC | Age: 49
End: 2019-12-26
Payer: MEDICAID

## 2019-12-26 PROCEDURE — 99212 OFFICE O/P EST SF 10 MIN: CPT | Mod: 25

## 2019-12-26 PROCEDURE — 29580 STRAPPING UNNA BOOT: CPT | Mod: LT

## 2019-12-26 NOTE — PHYSICAL EXAM
[Respiratory Effort] : normal respiratory effort [Normal Heart Sounds] : normal heart sounds [Ankle Swelling (On Exam)] : present [Ankle Swelling On The Left] : of the left ankle [2+] : left 2+ [Calm] : calm [de-identified] : well appearing [Ankle Swelling Bilaterally] : severe [de-identified] : large ulcer measuring 1x1.5x2cm with serous drainage.

## 2019-12-26 NOTE — ASSESSMENT
[FreeTextEntry1] : 48 year old male s/p hematoma evacuation in the ED on 10/6/19. Continue UNNA boot therapy weekly. Instructions given to patient, patient understands. continues to improve every week. FU in one week.  [Arterial/Venous Disease] : arterial/venous disease

## 2019-12-26 NOTE — HISTORY OF PRESENT ILLNESS
[FreeTextEntry1] : 47 yo old homeless male with PMH of ESRD currently on dialysis presenting to the office for FU wound. He came to the ED on 10/6/19 with large hematoma after playing basketball (was on Eliquis) and he had I&D done in the ED. \par \par Tolerating UNNA boots since 10/29/19, wound getting smaller. He took his UNNA boot off last night and wrapped with ACE bandage, some bleeding but stopped with pressure. hypertrophic area last visit an silver nitrite stick was used.

## 2020-01-02 ENCOUNTER — APPOINTMENT (OUTPATIENT)
Dept: VASCULAR SURGERY | Facility: CLINIC | Age: 50
End: 2020-01-02
Payer: MEDICAID

## 2020-01-02 DIAGNOSIS — Z99.2 END STAGE RENAL DISEASE: ICD-10-CM

## 2020-01-02 DIAGNOSIS — N18.6 END STAGE RENAL DISEASE: ICD-10-CM

## 2020-01-02 PROCEDURE — 29580 STRAPPING UNNA BOOT: CPT | Mod: LT

## 2020-01-02 PROCEDURE — 99212 OFFICE O/P EST SF 10 MIN: CPT | Mod: 25

## 2020-01-02 NOTE — ASSESSMENT
[FreeTextEntry1] : 48 year old male s/p hematoma evacuation in the ED on 10/6/19. Continue UNNA boot therapy weekly. Instructions given to patient, patient understands. continues to improve every week. FU in one week.

## 2020-01-02 NOTE — HISTORY OF PRESENT ILLNESS
[FreeTextEntry1] : 49 yo old homeless male with PMH of ESRD currently on dialysis presenting to the office for FU wound. He came to the ED on 10/6/19 with large hematoma after playing basketball (was on Eliquis) and he had I&D done in the ED. \par \par Tolerating UNNA boots since 10/29/19, wound getting smaller. He took his UNNA boot off last night and wrapped with ACE bandage.

## 2020-01-09 ENCOUNTER — APPOINTMENT (OUTPATIENT)
Dept: VASCULAR SURGERY | Facility: CLINIC | Age: 50
End: 2020-01-09
Payer: MEDICAID

## 2020-01-09 DIAGNOSIS — I83.023 VARICOSE VEINS OF LEFT LOWER EXTREMITY WITH ULCER OF ANKLE: ICD-10-CM

## 2020-01-09 DIAGNOSIS — L97.329 VARICOSE VEINS OF LEFT LOWER EXTREMITY WITH ULCER OF ANKLE: ICD-10-CM

## 2020-01-09 PROCEDURE — 99212 OFFICE O/P EST SF 10 MIN: CPT | Mod: 25

## 2020-01-09 PROCEDURE — 29580 STRAPPING UNNA BOOT: CPT | Mod: LT

## 2020-01-09 NOTE — ASSESSMENT
[FreeTextEntry1] : 48 year old male s/p hematoma evacuation in the ED on 10/6/19. Wound is now healed after UNNA boot therapy, will apply one more UNNA boot today. He does have chronic leg swelling, recommend compression therapy after he removes his UNNA boot next week. FU as needed.

## 2020-01-09 NOTE — PHYSICAL EXAM
[Respiratory Effort] : normal respiratory effort [Normal Heart Sounds] : normal heart sounds [2+] : left 2+ [Ankle Swelling (On Exam)] : present [Ankle Swelling Bilaterally] : severe [Ankle Swelling On The Left] : of the left ankle [Calm] : calm [de-identified] : well appearing [de-identified] : ulcer is now closed completely with very small eschar.

## 2020-01-24 ENCOUNTER — EMERGENCY (EMERGENCY)
Facility: HOSPITAL | Age: 50
LOS: 1 days | Discharge: HOME | End: 2020-01-24
Attending: EMERGENCY MEDICINE | Admitting: EMERGENCY MEDICINE
Payer: COMMERCIAL

## 2020-01-24 VITALS
SYSTOLIC BLOOD PRESSURE: 152 MMHG | HEART RATE: 81 BPM | TEMPERATURE: 98 F | HEIGHT: 69 IN | WEIGHT: 233.69 LBS | DIASTOLIC BLOOD PRESSURE: 81 MMHG | RESPIRATION RATE: 18 BRPM | OXYGEN SATURATION: 100 %

## 2020-01-24 DIAGNOSIS — I77.0 ARTERIOVENOUS FISTULA, ACQUIRED: Chronic | ICD-10-CM

## 2020-01-24 PROCEDURE — 71046 X-RAY EXAM CHEST 2 VIEWS: CPT

## 2020-01-24 PROCEDURE — 71046 X-RAY EXAM CHEST 2 VIEWS: CPT | Mod: 26

## 2020-01-24 PROCEDURE — 99283 EMERGENCY DEPT VISIT LOW MDM: CPT | Mod: 25

## 2020-01-24 PROCEDURE — 87631 RESP VIRUS 3-5 TARGETS: CPT

## 2020-01-24 PROCEDURE — 99283 EMERGENCY DEPT VISIT LOW MDM: CPT

## 2020-01-24 NOTE — ED ADULT NURSE NOTE - OBJECTIVE STATEMENT
50 y/o male received into the ED, axox3, stating that he has been having cold like symptoms with fever, bodyaches, runny nose and cough with green colored sputum for the past 2 days.  Pt. states that he is a dialysis pt. and last had dialysis yesterday.

## 2020-01-24 NOTE — ED PROVIDER NOTE - PATIENT PORTAL LINK FT
You can access the FollowMyHealth Patient Portal offered by [facility] by registering at the following website: [email or web address]. By joining [facility]’s FollowMyHealth portal, you will also be able to view your health information using other applications (apps) compatible with our system.

## 2020-01-24 NOTE — ED PROVIDER NOTE - PHYSICAL EXAMINATION
VITAL SIGNS: I have reviewed nursing notes and confirm.  CONSTITUTIONAL: Well-developed; well-nourished; in no acute distress.   SKIN:  warm and dry, no acute rash.   HEAD:  normocephalic, atraumatic.  EYES: PERRL, EOM intact; conjunctiva and sclera clear.  ENT: No nasal discharge; airway clear.   NECK: Supple; non tender.  CARD: S1, S2 normal; no murmurs, gallops, or rubs. Regular rate and rhythm.   RESP:  Clear to auscultation b/l, no wheezes, rales or rhonchi.  ABD: Normal bowel sounds; soft; non-distended; non-tender; no guarding/ rebound.  EXT: Normal ROM. No clubbing, cyanosis or edema. 2+ pulses to b/l ue/le. LUE AV fistula in place, + bruit and thrill.   NEURO: Alert, oriented, grossly unremarkable  PSYCH: Cooperative, mood and affect appropriate. VITAL SIGNS: I have reviewed nursing notes and confirm.  CONSTITUTIONAL: Well-developed; well-nourished; in no acute distress.   SKIN:  warm and dry, no acute rash.   HEAD:  normocephalic, atraumatic.  EYES: EOM intact; conjunctiva and sclera clear.  ENT: No nasal discharge; airway clear.   NECK: Supple; non tender.  CARD: S1, S2 normal; no murmurs, gallops, or rubs. Regular rate and rhythm.   RESP:  Clear to auscultation b/l, no wheezes, rales or rhonchi.  ABD: Normal bowel sounds; soft; non-distended; non-tender; no guarding/ rebound.  EXT: Normal ROM. No clubbing, cyanosis or edema. 2+ pulses to b/l ue/le. LUE AV fistula, + bruit and thrill.   NEURO: Alert, oriented, grossly unremarkable  PSYCH: Cooperative, mood and affect appropriate.

## 2020-01-24 NOTE — ED PROVIDER NOTE - CLINICAL SUMMARY MEDICAL DECISION MAKING FREE TEXT BOX
Impression: acute uri, likely viral. Afebrile. HDS. CXR unchanged from prior with no infiltrate. RSV/Flu swab neg. ED evaluation and management discussed with the patient in detail.  Close PMD follow up encouraged.  Strict ED return instructions discussed in detail and patient given the opportunity to ask any questions about their discharge diagnosis and instructions. Patient verbalized understanding.

## 2020-01-24 NOTE — ED ADULT NURSE NOTE - NSIMPLEMENTINTERV_GEN_ALL_ED
Implemented All Universal Safety Interventions:  [place] to call system. Call bell, personal items and telephone within reach. Instruct patient to call for assistance. Room bathroom lighting operational. Non-slip footwear when patient is off stretcher. Physically safe environment: no spills, clutter or unnecessary equipment. Stretcher in lowest position, wheels locked, appropriate side rails in place.

## 2020-01-24 NOTE — ED PROVIDER NOTE - OBJECTIVE STATEMENT
50 y/o M with PMHx of ESRD on dialysis (T, Th, Sat) last dialyzed yesterday, and PE on Eliquis presenting with complaints of chills, rhinorrhea, cough with productive sputum, nausea and nbnb emesis. Pt also describes stool to be soft, nb. Denies any abdominal pain, chest pain or SOB. 50 y/o M with PMHx of ESRD on dialysis (T, Th, Sat) last dialyzed yesterday, and PE on Eliquis presenting with complaints of chills, rhinorrhea, cough with productive sputum, nausea and nbnb emesis. Pt also describes stool to be soft, nb, no melena. Denies any abdominal pain, chest pain or SOB.

## 2020-01-30 DIAGNOSIS — J06.9 ACUTE UPPER RESPIRATORY INFECTION, UNSPECIFIED: ICD-10-CM

## 2020-01-30 DIAGNOSIS — R68.83 CHILLS (WITHOUT FEVER): ICD-10-CM
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
## 2020-05-03 ENCOUNTER — EMERGENCY (EMERGENCY)
Facility: HOSPITAL | Age: 50
LOS: 1 days | Discharge: HOME | End: 2020-05-03
Attending: EMERGENCY MEDICINE | Admitting: EMERGENCY MEDICINE
Payer: COMMERCIAL

## 2020-05-03 VITALS
WEIGHT: 235.89 LBS | DIASTOLIC BLOOD PRESSURE: 66 MMHG | HEART RATE: 96 BPM | SYSTOLIC BLOOD PRESSURE: 122 MMHG | TEMPERATURE: 98 F | RESPIRATION RATE: 19 BRPM | OXYGEN SATURATION: 97 %

## 2020-05-03 DIAGNOSIS — M25.561 PAIN IN RIGHT KNEE: ICD-10-CM

## 2020-05-03 DIAGNOSIS — M25.571 PAIN IN RIGHT ANKLE AND JOINTS OF RIGHT FOOT: ICD-10-CM

## 2020-05-03 DIAGNOSIS — I77.0 ARTERIOVENOUS FISTULA, ACQUIRED: Chronic | ICD-10-CM

## 2020-05-03 PROCEDURE — 99284 EMERGENCY DEPT VISIT MOD MDM: CPT

## 2020-05-03 RX ORDER — ACETAMINOPHEN 500 MG
975 TABLET ORAL ONCE
Refills: 0 | Status: COMPLETED | OUTPATIENT
Start: 2020-05-03 | End: 2020-05-03

## 2020-05-03 RX ADMIN — Medication 975 MILLIGRAM(S): at 23:14

## 2020-05-03 NOTE — ED PROVIDER NOTE - NSFOLLOWUPINSTRUCTIONS_ED_ALL_ED_FT
Return to the Emergency Department if you have any new or worsening symptoms, or if you have any concerns. Otherwise, please follow up with your PMD.

## 2020-05-03 NOTE — ED PROVIDER NOTE - OBJECTIVE STATEMENT
50 y/o M, undomiciled, PMHx of ESRD on HD, PE, presents to the ED with R leg knee and ankle pain x 2-3 days. Patient states he woke up and felt the pain begin, no injury or trauma at that time. Was able to walk to the ED. Did not take any medications for the pain. Denies fever, chills, nausea, vomiting, diarrhea, abdominal pain, SOB, cough, numbness, tingling, weakness. 50 y/o M, undomiciled, PMHx of ESRD on HD, PE, presents to the ED with R leg knee and ankle pain x 2-3 days. Patient states he woke up and felt the pain begin, no injury or trauma at that time. Was able to walk to the ED. Did not take any medications for the pain. Denies fever, chills, nausea, vomiting, diarrhea, abdominal pain, SOB, cough, numbness, tingling, weakness, swelling, rash. 48 y/o M, undomiciled, PMHx of ESRD on HD, PE, presents to the ED with R knee and ankle pain x 2-3 days. Patient states he woke up and felt the pain begin, no injury or trauma at that time. Was able to walk to the ED. Did not take any medications for the pain. Denies fever, chills, nausea, vomiting, diarrhea, abdominal pain, SOB, cough, numbness, tingling, weakness, swelling, rash.

## 2020-05-03 NOTE — ED PROVIDER NOTE - CLINICAL SUMMARY MEDICAL DECISION MAKING FREE TEXT BOX
48 y/o M with R knee and ankle pain, R ankle TTP. Patient requesting XRay, will get XR of R knee and R ankle, give Tylenol for pain control. 48 y/o M with R knee and ankle pain, R ankle mild TTP. Patient requesting XRay, will get XR of R knee and R ankle, give Tylenol for pain control. 50 y/o M with R knee and ankle pain, R ankle mild TTP. Patient requesting XRay, will get XR of R knee and R ankle, give Tylenol for pain control. Xray negative. Pain treated and improved. No soft tissue injury, no concern for cellulitis or dvt. Advised 50 y/o M with R knee and ankle pain, R ankle mild TTP. Patient requesting XRay, will get XR of R knee and R ankle, give Tylenol for pain control. Xray negative. Pain treated and improved. No soft tissue injury, no concern for cellulitis or dvt. Advised follow up with PMD.

## 2020-05-03 NOTE — ED PROVIDER NOTE - MUSCULOSKELETAL, MLM
R lateral dorsal ankle TTP. FROM of R foot. Spine appears normal, range of motion is not limited. R lateral dorsal ankle mild TTP. FROM of R foot. soft compartment throughout. Spine appears normal, range of motion is not limited.

## 2020-05-03 NOTE — ED ADULT TRIAGE NOTE - CHIEF COMPLAINT QUOTE
Presents to ED for right leg/knee pain for "couple of days".  Also endorses dyspnea on exertion x1 day.  Last HD completed 5/2/20.

## 2020-05-03 NOTE — ED PROVIDER NOTE - PATIENT PORTAL LINK FT
You can access the FollowMyHealth Patient Portal offered by [facility] by registering at the following website: [email or web address]. By joining [employer]’s FollowMyHealth portal, you will also be able to view your health information using other applications (apps) compatible with our system.

## 2020-05-03 NOTE — ED PROVIDER NOTE - DIAGNOSTIC INTERPRETATION
Xray knee: no acute fracture, no soft tissue swelling noted, normal bony alignment  Xray foot: no acute fracture, no soft tissue swelling noted, normal bony alignment  Xray ankle: no acute fracture, no soft tissue swelling noted, normal bony alignment

## 2020-05-03 NOTE — ED ADULT TRIAGE NOTE - MODE OF ARRIVAL
Tac level above goal. Current dose = 2mg BID.  Confirm dose and trough. Ensure no recent illness or changes to other medications.  If accurate, DECREASE tacrolimus to 1.5mg BID.  Repeat labs on Monday as scheduled.    Restart bactrim. Was being doses MWF - monitor for increase as creatinine improves.     Harshad reports his activity level has increased and he feels dehydration and does not know if his creatinine will be affected. RNCC suggested good hydration and will monitor creatinine.     Harshad voiced understanding to restart bactrim, monitor WBCs and decrease tac dose.  
Walk in

## 2020-05-03 NOTE — ED PROVIDER NOTE - ATTENDING CONTRIBUTION TO CARE
49M undomiciled, esrd on hd, pe on eliquis, c/o insidious persistent nonradiating R knee/ankle pain x2-3d. pt also requesting food. no fever/chills, no uri/cough, no cp/sob, no rash/swelling, no trauma, no dm/ivdu/etoh. avss. nontoxic. NAD. no systemic sx. +mild ttp diffuse R knee and R lateral dorsal midfoot w/o assoc swelling/rash/warmth. neurovasc intact. atraumatic. normal gait. R knee/ankle/foot xray w/o acute fx/dislocation. pain controlled. no indication for advanced imaging at this time. will dc w/ outpatient pcp fu, strict return precautions. pt agrees w/ plan. questions answered.     I saw and discussed the care of the pt directly with the ACP while the pt was in the ED. i have reviewed the ACP note and agree w/ the history, exam and plan of care other than as noted above.

## 2020-05-04 VITALS
HEART RATE: 90 BPM | RESPIRATION RATE: 18 BRPM | SYSTOLIC BLOOD PRESSURE: 125 MMHG | DIASTOLIC BLOOD PRESSURE: 65 MMHG | OXYGEN SATURATION: 98 % | TEMPERATURE: 98 F

## 2020-05-04 PROBLEM — I26.99 OTHER PULMONARY EMBOLISM WITHOUT ACUTE COR PULMONALE: Chronic | Status: ACTIVE | Noted: 2020-01-25

## 2020-05-04 PROCEDURE — 73610 X-RAY EXAM OF ANKLE: CPT

## 2020-05-04 PROCEDURE — 73630 X-RAY EXAM OF FOOT: CPT | Mod: 26,RT

## 2020-05-04 PROCEDURE — 73562 X-RAY EXAM OF KNEE 3: CPT | Mod: 26,RT

## 2020-05-04 PROCEDURE — 73630 X-RAY EXAM OF FOOT: CPT

## 2020-05-04 PROCEDURE — 99284 EMERGENCY DEPT VISIT MOD MDM: CPT

## 2020-05-04 PROCEDURE — 73562 X-RAY EXAM OF KNEE 3: CPT

## 2020-05-04 PROCEDURE — 73610 X-RAY EXAM OF ANKLE: CPT | Mod: 26,RT

## 2020-05-04 NOTE — ED ADULT NURSE REASSESSMENT NOTE - NS ED NURSE REASSESS COMMENT FT1
Patient was received in ED c/o pain rt leg with numbness  patient was administered meds and test pain resolved . Patient was discharged able to walk with a steady gait .

## 2020-05-05 ENCOUNTER — EMERGENCY (EMERGENCY)
Facility: HOSPITAL | Age: 50
LOS: 1 days | Discharge: HOME | End: 2020-05-05
Attending: EMERGENCY MEDICINE | Admitting: EMERGENCY MEDICINE
Payer: MEDICAID

## 2020-05-05 VITALS
TEMPERATURE: 99 F | HEART RATE: 92 BPM | DIASTOLIC BLOOD PRESSURE: 75 MMHG | HEIGHT: 69 IN | SYSTOLIC BLOOD PRESSURE: 120 MMHG | OXYGEN SATURATION: 98 % | RESPIRATION RATE: 18 BRPM | WEIGHT: 223.77 LBS

## 2020-05-05 DIAGNOSIS — I77.0 ARTERIOVENOUS FISTULA, ACQUIRED: Chronic | ICD-10-CM

## 2020-05-05 PROCEDURE — 73630 X-RAY EXAM OF FOOT: CPT | Mod: 26,RT

## 2020-05-05 PROCEDURE — 99283 EMERGENCY DEPT VISIT LOW MDM: CPT | Mod: 25

## 2020-05-05 RX ORDER — ACETAMINOPHEN 500 MG
975 TABLET ORAL ONCE
Refills: 0 | Status: COMPLETED | OUTPATIENT
Start: 2020-05-05 | End: 2020-05-05

## 2020-05-05 NOTE — ED ADULT TRIAGE NOTE - CHIEF COMPLAINT QUOTE
Pt c/o R foot pn for 2 days. Pt denies injury to area, states he woke up with pn. R ankle appears swollen. Pt able to ambulate with minimal difficulty. PMH of ESRD, pt gets dialysis T/R/Sat, AV fistula L arm.

## 2020-05-05 NOTE — ED PROVIDER NOTE - CONDITION AT DISCHARGE:
need for outpatient follow-up/radiology results/return to ED if symptoms worsen, persist or questions arise Improved

## 2020-05-05 NOTE — ED PROVIDER NOTE - CLINICAL SUMMARY MEDICAL DECISION MAKING FREE TEXT BOX
atraumatic R foot pain x 2 days, nontoxic , afebrile, ?xray to r/o fx/dislocation, ?diabetic foot neuropathy, no gangrene on exam, possible early evolving cellulitis? no calf pain/swelling/tenderness to suspect DVT, no obvious acute ischemic findings,

## 2020-05-05 NOTE — ED PROVIDER NOTE - OBJECTIVE STATEMENT
49 yom pw R foot pain, x 2 days, woke up with it, worse w/ walking.  no fc, no trauma, denies any injection.  denies hx of prosthesis. no pain elsewhere.

## 2020-05-05 NOTE — ED PROVIDER NOTE - PATIENT PORTAL LINK FT
You can access the FollowMyHealth Patient Portal offered by [facility] by registering at the following website: [email or web address]. By joining [employer]’s FollowMyHealth portal, you will also be able to view your health information using other applications (apps) compatible with our system.

## 2020-05-05 NOTE — ED PROVIDER NOTE - DIAGNOSTIC INTERPRETATION
ER Physician: Sang Harley  FOOT XRAY INTERPRETATION:  no acute fracture; no soft tissue swelling noted; angulated 5th phalange

## 2020-05-05 NOTE — ED PROVIDER NOTE - PHYSICAL EXAMINATION
Physical Exam  GEN: Awake, alert, non-toxic appearing, NCAT  EYES: full EOMI,  ENT: External inspection normal, normal voice,   HEAD: atraumatic  NECK: FROM neck, supple,   RESP: no tachypnea, no hypoxia, no resp distress,  MSK: tenderness over 4th/5th metatarsal of R foot, no obvious deformity from visualization, nontender calcaneus/achilles tendon, FROM of dorsiplantarflexion and eversion/inversion, no swelling/tenderness to bl calf, per triage noted R ankle swelling but on exam bl ankles appear similar, soft compartment throughout bl LE  SKIN: pedal pulse intact, cap refill < 2 sec, no obvious erythema noted, no fluctuance or induration, no obvious skin discoloration, no gangrene

## 2020-05-06 VITALS
HEART RATE: 91 BPM | DIASTOLIC BLOOD PRESSURE: 80 MMHG | TEMPERATURE: 98 F | RESPIRATION RATE: 18 BRPM | OXYGEN SATURATION: 98 % | SYSTOLIC BLOOD PRESSURE: 156 MMHG

## 2020-05-06 PROCEDURE — 73630 X-RAY EXAM OF FOOT: CPT | Mod: 26,RT

## 2020-05-06 RX ADMIN — Medication 975 MILLIGRAM(S): at 00:01

## 2020-05-06 RX ADMIN — Medication 100 MILLIGRAM(S): at 02:04

## 2020-05-06 NOTE — ED ADULT NURSE NOTE - OBJECTIVE STATEMENT
48 y/o M c/o R foot pn. Pt denies injury to area, states he woke up with pn. Pt able to ambulate w/ minimal difficulty. Pt denies recent illness, fever, CP, SOB, N/V/D. Pt has ESRD and receives HD T/R/Sat through L arm AV fistula. Pt denies additional PMH.

## 2020-05-06 NOTE — ED ADULT NURSE REASSESSMENT NOTE - NS ED NURSE REASSESS COMMENT FT1
Pt sitting in chair, states that pain has mildly improved. Pt pending X-ray of foot. Will continue to monitor. Safety is maintained.
report received from TAMIE Lentz. Pt is AAOX3, breathing unlabored and spontaneous  , imaging pending, will continue to monitor

## 2020-05-08 DIAGNOSIS — M79.671 PAIN IN RIGHT FOOT: ICD-10-CM

## 2020-05-08 DIAGNOSIS — M79.89 OTHER SPECIFIED SOFT TISSUE DISORDERS: ICD-10-CM

## 2020-06-14 ENCOUNTER — EMERGENCY (EMERGENCY)
Facility: HOSPITAL | Age: 50
LOS: 1 days | Discharge: HOME | End: 2020-06-14
Attending: EMERGENCY MEDICINE | Admitting: EMERGENCY MEDICINE
Payer: COMMERCIAL

## 2020-06-14 VITALS
TEMPERATURE: 97 F | HEART RATE: 92 BPM | RESPIRATION RATE: 18 BRPM | SYSTOLIC BLOOD PRESSURE: 162 MMHG | OXYGEN SATURATION: 99 % | DIASTOLIC BLOOD PRESSURE: 88 MMHG

## 2020-06-14 DIAGNOSIS — M79.673 PAIN IN UNSPECIFIED FOOT: ICD-10-CM

## 2020-06-14 DIAGNOSIS — M79.671 PAIN IN RIGHT FOOT: ICD-10-CM

## 2020-06-14 DIAGNOSIS — I77.0 ARTERIOVENOUS FISTULA, ACQUIRED: Chronic | ICD-10-CM

## 2020-06-14 PROCEDURE — 99282 EMERGENCY DEPT VISIT SF MDM: CPT

## 2020-06-14 RX ORDER — ACETAMINOPHEN 500 MG
650 TABLET ORAL ONCE
Refills: 0 | Status: COMPLETED | OUTPATIENT
Start: 2020-06-14 | End: 2020-06-14

## 2020-06-14 RX ADMIN — Medication 650 MILLIGRAM(S): at 21:58

## 2020-06-14 NOTE — ED PROVIDER NOTE - MUSCULOSKELETAL, MLM
right foot +lateral tenderness, no swelling or deformity, +FROM ankle and foot, sensation intact distally, DP/PT 2+

## 2020-06-14 NOTE — ED PROVIDER NOTE - OBJECTIVE STATEMENT
50 y/o m presents c/o atraumatic right foot pain which has been ongoing for several months.  Pt was in ED last month, had xr which showed ?cyst at 1st metatarsal head.  Pt has since been to another ED, given "a blue pill" for pain which he reports didn't provide significant improvement in pain.  Pain is on side of foot.  Pt denies new trauma, numbness/tingling to ext, all other ROS negative.

## 2020-06-14 NOTE — ED ADULT TRIAGE NOTE - ARRIVAL INFO ADDITIONAL COMMENTS
pt c/o right foot pain since this am.  denies trauma and states he was seen here in the past for same but was told nothing was wrong.

## 2020-06-14 NOTE — ED PROVIDER NOTE - ATTENDING CONTRIBUTION TO CARE
here w/ chronic foot pain, not following up with outpatient specialist. VSS, non toxic appearing, mild tenderness to R lateral foot without deformity or edema, pulses intact. plan for outpatient f/u with podiatry, # given.

## 2020-06-14 NOTE — ED PROVIDER NOTE - PATIENT PORTAL LINK FT
You can access the FollowMyHealth Patient Portal offered by [facility] by registering at the following website: [email or web address]. By joining [employer]’s FollowMyHealth portal, you will also be able to view your health information using other applications (apps) compatible with our system.

## 2020-06-14 NOTE — ED PROVIDER NOTE - CLINICAL SUMMARY MEDICAL DECISION MAKING FREE TEXT BOX
50 y/o m presents c/o right foot pain for the past few months; no change in quality from his chronic foot pain, has been seen multiple times, hasn't followed up with podiatry.  Last xr last month, no indication for new imaging.  Pt given tylenol in ED, will refer to podiatry for further eval, declined post op shoe.

## 2020-06-14 NOTE — ED PROVIDER NOTE - NSFOLLOWUPINSTRUCTIONS_ED_ALL_ED_FT
Foot Pain  Many things can cause foot pain. Some common causes are:  An injury.A sprain.Arthritis.Blisters.Bunions.Follow these instructions at home:  Managing pain, stiffness, and swelling     If directed, put ice on the painful area:  Put ice in a plastic bag.Place a towel between your skin and the bag.Leave the ice on for 20 minutes, 2–3 times a day.Activity     Do not stand or walk for long periods.Return to your normal activities as told by your health care provider. Ask your health care provider what activities are safe for you.Do stretches to relieve foot pain and stiffness as told by your health care provider.Do not lift anything that is heavier than 10 lb (4.5 kg), or the limit that you are told, until your health care provider says that it is safe. Lifting a lot of weight can put added pressure on your feet.Lifestyle     Wear comfortable, supportive shoes that fit you well. Do not wear high heels.Keep your feet clean and dry.General instructions     Take over-the-counter and prescription medicines only as told by your health care provider.Rub your foot gently.Pay attention to any changes in your symptoms.Keep all follow-up visits as told by your health care provider. This is important.Contact a health care provider if:  Your pain does not get better after a few days of self-care.Your pain gets worse.You cannot stand on your foot.Get help right away if:  Your foot is numb or tingling.Your foot or toes are swollen.Your foot or toes turn white or blue.You have warmth and redness along your foot.Summary  Common causes of foot pain are injury, sprain, arthritis, blisters or bunions.Ice, medicines, and comfortable shoes may help foot pain.Contact your health care provider if your pain does not get better after a few days of self-care.This information is not intended to replace advice given to you by your health care provider. Make sure you discuss any questions you have with your health care provider.

## 2020-06-14 NOTE — ED PROVIDER NOTE - NSFOLLOWUPCLINICS_GEN_ALL_ED_FT
[facility] - Podiatry [facility]  Podiatry  [address] [place] [address]  Phone: [phone]  Fax:   Follow Up Time:

## 2020-06-23 ENCOUNTER — APPOINTMENT (OUTPATIENT)
Dept: INTERNAL MEDICINE | Facility: CLINIC | Age: 50
End: 2020-06-23

## 2020-08-18 NOTE — PATIENT PROFILE ADULT - NSPROMUTINFOINDIVIDFT_GEN_A_NUR
Called [facility] @ [phone] to check the status of the PA- Spoke with Zabrina Rodgers and the PA  has been denied  The pt needs to have tried oral acyclovir or valcyclovir      Left message for the pt to call back let me know if she has tried orals none

## 2020-10-26 NOTE — ED ADULT TRIAGE NOTE - CCCP TRG CHIEF CMPLNT
diphenhydrAMINE, HYDROcodone-acetaminophen, nitroGLYCERIN, polyethylene glycol, glucose, dextrose, glucagon (rDNA), dextrose    Data:     Past Medical History:   has a past medical history of Abnormal ECG, Acquired hypothyroidism, Acute cholecystitis, Atherosclerosis of coronary artery bypass graft(s) without angina pectoris, Atrioventricular block, complete ([address] Biliary colic, Block, bundle branch, left, Calculus of bile duct without cholecystitis and without obstruction, Calculus of gallbladder, Cancer (HCC), Carotid artery stenosis, Cataract, Cerebrovascular disease, Cervical disc herniation, Cervical radiculopathy, chronic, Cervical spondylosis, CHF (congestive heart failure) (HCC), Chronic systolic CHF (congestive heart failure), NYHA class 2 ([address] Coronary arteriosclerosis in native artery, COVID-19 virus infection, Degeneration of cervical intervertebral disc, Elevated LFTs, Elevated liver enzymes, H/O malignant neoplasm of thyroid, Heart attack ([address] Heart block AV second degree, Hypercholesteremia, Hypertension, Hypothyroidism, Ischemic cardiomyopathy, Metastatic cancer ([address] Mild aortic regurgitation, Pneumonia due to COVID-19 virus, Pulmonary nodule, Pulmonary nodules/lesions, multiple, S/P CABG x 3, Sick sinus syndrome (HCC), SOB (shortness of breath), Spinal stenosis in cervical region, Type 2 diabetes mellitus ([address] and Type II or unspecified type diabetes mellitus without mention of complication, not stated as uncontrolled. Social History:   reports that he has never smoked. He has never used smokeless tobacco. He reports that he does not drink alcohol or use drugs.      Family History:   Family History   Problem Relation Age of Onset   [facility] Cancer Mother         stomach    Other Father         pneumonia       Vitals:  /60   Pulse 77   Temp 98.4 °F (36.9 °C) (Axillary)   Resp 16   Ht 5' 2\" (1.575 m)   Wt 114 lb 6.7 oz (51.9 kg)   SpO2 96%   BMI 20.93 kg/m²   Temp (24hrs), Av.3 shortness of breath metastatic pulmonary nodules question mild progression     Xr Chest Portable    Result Date: 10/22/2020  Permanent left-sided AV sequential bipolar pacemaker. CABG. No cardiomegaly, interstitial edema or pleural effusion. Bilateral lower lobe non consolidating infiltration was again noted. This is either due to chronic pneumonia or underlying bronchiectasis. Pulmonary parenchymal nodularity was again identified. No significant change has occurred from 09/17/2020. Physical Examination:        General appearance:  alert, cooperative and no distress  Mental Status:  oriented to person, place and time and normal affect  Lungs:  Decreased breath sounds bilaterally, normal effort  Heart:  regular rate and rhythm, no murmur  Abdomen:  soft, nontender, nondistended, normal bowel sounds    Assessment:        Hospital Problems           Last Modified POA    * (Principal) Sepsis ([facility] [address] 10/23/2020 Yes    Metastasis from thyroid cancer ([facility] [address] 10/23/2020 Yes    UTI (urinary tract infection) 10/23/2020 Yes    NSTEMI (non-ST elevated myocardial infarction) ([facility] [address] 10/23/2020 Yes    Fever 10/23/2020 Yes    Pneumonia due to organism 10/23/2020 Yes          Plan:        1. DC IV antibiotics. Will order oral Levaquin for 5 days  2. Restart Glimepiride 1 mg. Continue Alogliptin  3. Chest pain- no further w/u per cardio  4. Out patient follow up with hem-onc  5.  Start Lovenox for DVT prophylaxis  6. DC tomorrow if patient continues to do well.       ,Bebeto Torres MD  10/25/2020  10:49 PM

## 2020-11-17 NOTE — DISCHARGE NOTE PROVIDER - NSDCDCMDCOMP_GEN_ALL_CORE
This document is complete and the patient is ready for discharge. [Initial] : an initial consultation for

## 2021-01-17 NOTE — PHYSICAL EXAM
[Normal Heart Sounds] : normal heart sounds [Respiratory Effort] : normal respiratory effort [Ankle Swelling (On Exam)] : present [2+] : left 2+ [Ankle Swelling Bilaterally] : severe [Ankle Swelling On The Left] : of the left ankle [Calm] : calm [de-identified] : well appearing [de-identified] : large ulcer measuring 1x1x0.5cm. You can access the FollowMyHealth Patient Portal offered by [facility] by registering at the following website: [email or web address]. By joining [employer]’s FollowMyHealth portal, you will also be able to view your health information using other applications (apps) compatible with our system.

## 2021-03-05 NOTE — ED PROVIDER NOTE - CARE PLAN
Principal Discharge DX:	Venous stasis dermatitis Acitretin Counseling:  I discussed with the patient the risks of acitretin including but not limited to hair loss, dry lips/skin/eyes, liver damage, hyperlipidemia, depression/suicidal ideation, photosensitivity.  Serious rare side effects can include but are not limited to pancreatitis, pseudotumor cerebri, bony changes, clot formation/stroke/heart attack.  Patient understands that alcohol is contraindicated since it can result in liver toxicity and significantly prolong the elimination of the drug by many years.

## 2021-03-22 NOTE — ED PROVIDER NOTE - CPE EDP CARDIAC NORM
Validate Previous Accession (Can Hide Previous Accession In Settings Tab): No Graft Donor Site Bandage (Optional-Leave Blank If You Don't Want In Note): Pressure bandage applied over zero form secured with 3-0 prolene Width Of Defect Perpendicular To Closure In Cm (Required): 1 Transposition Flap Text: The defect edges were debeveled with a #15 scalpel blade.  Given the location of the defect and the proximity to free margins a transposition flap was deemed most appropriate.  Using a sterile surgical marker, an appropriate transposition flap was drawn incorporating the defect.    The area thus outlined was incised deep to adipose tissue with a #15 scalpel blade.  The skin margins were undermined to an appropriate distance in all directions utilizing iris scissors. Alar Island Pedicle Flap Text: The defect edges were debeveled with a #15 scalpel blade.  Given the location of the defect, shape of the defect and the proximity to the alar rim an island pedicle advancement flap was deemed most appropriate.  Using a sterile surgical marker, an appropriate advancement flap was drawn incorporating the defect, outlining the appropriate donor tissue and placing the expected incisions within the nasal ala running parallel to the alar rim. The area thus outlined was incised with a #15 scalpel blade.  The skin margins were undermined minimally to an appropriate distance in all directions around the primary defect and laterally outward around the island pedicle utilizing iris scissors.  There was minimal undermining beneath the pedicle flap. Stage 15: Additional Anesthesia Type: 1% lidocaine with epinephrine Plastic Surgeon Procedure Text (C): After obtaining clear surgical margins the patient was sent to plastics for surgical repair.  The patient understands they will receive post-surgical care and follow-up from the referring physician's office. Show Mohs Case Number Variable: Yes Mid-Level Procedure Text (E): After obtaining clear surgical margins the patient was sent to a mid-level provider for surgical repair.  The patient understands they will receive post-surgical care and follow-up from the mid-level provider. Hemigard Postcare Instructions: The HEMIGARD strips are to remain completely dry for at least 5-7 days. Referred To Oculoplastics For Closure Text (Leave Blank If You Do Not Want): After obtaining clear surgical margins the patient was sent to oculoplastics for surgical repair.  The patient understands they will receive post-surgical care and follow-up from the referring physician's office. Otolaryngologist Procedure Text (A): After obtaining clear surgical margins the patient was sent to otolaryngology for surgical repair.  The patient understands they will receive post-surgical care and follow-up from the referring physician's office. Stage 2: Additional Anesthesia Type: 2% lidocaine with epinephrine Composite Graft Text: The defect edges were debeveled with a #15 scalpel blade.  Given the location of the defect, shape of the defect, the proximity to free margins and the fact the defect was full thickness a composite graft was deemed most appropriate.  The defect was outline and then transferred to the donor site.  A full thickness graft was then excised from the donor site. The graft was then placed in the primary defect, oriented appropriately and then sutured into place.  The secondary defect was then repaired using a primary closure. Rhombic Flap Text: The defect edges were debeveled with a #15 scalpel blade.  Given the location of the defect and the proximity to free margins a rhombic flap was deemed most appropriate.  Using a sterile surgical marker, an appropriate rhombic flap was drawn incorporating the defect.    The area thus outlined was incised deep to adipose tissue with a #15 scalpel blade.  The skin margins were undermined to an appropriate distance in all directions utilizing iris scissors. Quadrants Reporting?: 0 normal... Mart-1 - Negative Histology Text: MART-1 staining demonstrates a normal density and pattern of melanocytes along the dermal-epidermal junction. The surgical margins are negative for tumor cells. Special Stains Stage 15 - Results: Base On Clearance Noted Above Cheek-To-Nose Interpolation Flap Text: A decision was made to reconstruct the defect utilizing an interpolation axial flap and a staged reconstruction.  A telfa template was made of the defect.  This telfa template was then used to outline the Cheek-To-Nose Interpolation flap.  The donor area for the pedicle flap was then injected with anesthesia.  The flap was excised through the skin and subcutaneous tissue down to the layer of the underlying musculature.  The interpolation flap was carefully excised within this deep plane to maintain its blood supply.  The edges of the donor site were undermined.   The donor site was closed in a primary fashion.  The pedicle was then rotated into position and sutured.  Once the tube was sutured into place, adequate blood supply was confirmed with blanching and refill.  The pedicle was then wrapped with xeroform gauze and dressed appropriately with a telfa and gauze bandage to ensure continued blood supply and protect the attached pedicle. Repair Type: Complex Repair Mohs Rapid Report Verbiage: The area of clinically evident tumor was marked with skin marking ink and appropriately hatched.  The initial incision was made following the Mohs approach through the skin.  The specimen was taken to the lab, divided into the necessary number of pieces, chromacoded and processed according to the Mohs protocol.  This was repeated in successive stages until a tumor free defect was achieved. Asc Procedure Text (D): After obtaining clear surgical margins the patient was sent to an ASC for surgical repair.  The patient understands they will receive post-surgical care and follow-up from the ASC physician. Mucosal Advancement Flap Text: Given the location of the defect, shape of the defect and the proximity to free margins a mucosal advancement flap was deemed most appropriate. Incisions were made with a 15 blade scalpel in the appropriate fashion along the cutaneous vermillion border and the mucosal lip. The remaining actinically damaged mucosal tissue was excised.  The mucosal advancement flap was then elevated to the gingival sulcus with care taken to preserve the neurovascular structures and advanced into the primary defect. Care was taken to ensure that precise realignment of the vermillion border was achieved. Double Island Pedicle Flap Text: The defect edges were debeveled with a #15 scalpel blade.  Given the location of the defect, shape of the defect and the proximity to free margins a double island pedicle advancement flap was deemed most appropriate.  Using a sterile surgical marker, an appropriate advancement flap was drawn incorporating the defect, outlining the appropriate donor tissue and placing the expected incisions within the relaxed skin tension lines where possible.    The area thus outlined was incised deep to adipose tissue with a #15 scalpel blade.  The skin margins were undermined to an appropriate distance in all directions around the primary defect and laterally outward around the island pedicle utilizing iris scissors.  There was minimal undermining beneath the pedicle flap. Epidermal Closure: simple interrupted Surgeon/Pathologist Verbiage (Will Incorporate Name Of Surgeon From Intro If Not Blank): operated in two distinct and integrated capacities as the surgeon and pathologist. Repair Performed By Another Provider Text (Leave Blank If You Do Not Want): After obtaining clear surgical margins the defect was repaired by another provider. Lazy S Complex Repair Preamble Text (Leave Blank If You Do Not Want): Extensive wide undermining was performed. Rhomboid Transposition Flap Text: The defect edges were debeveled with a #15 scalpel blade.  Given the location of the defect and the proximity to free margins a rhomboid transposition flap was deemed most appropriate.  Using a sterile surgical marker, an appropriate rhomboid flap was drawn incorporating the defect.    The area thus outlined was incised deep to adipose tissue with a #15 scalpel blade.  The skin margins were undermined to an appropriate distance in all directions utilizing iris scissors. Consent (Marginal Mandibular)/Introductory Paragraph: The rationale for Mohs was explained to the patient and consent was obtained. The risks, benefits and alternatives to therapy were discussed in detail. Specifically, the risks of damage to the marginal mandibular branch of the facial nerve, infection, scarring, bleeding, prolonged wound healing, incomplete removal, allergy to anesthesia, and recurrence were addressed. Prior to the procedure, the treatment site was clearly identified and confirmed by the patient. All components of Universal Protocol/PAUSE Rule completed. Peng Advancement Flap Text: The defect edges were debeveled with a #15 scalpel blade.  Given the location of the defect, shape of the defect and the proximity to free margins a Peng advancement flap was deemed most appropriate.  Using a sterile surgical marker, an appropriate advancement flap was drawn incorporating the defect and placing the expected incisions within the relaxed skin tension lines where possible. The area thus outlined was incised deep to adipose tissue with a #15 scalpel blade.  The skin margins were undermined to an appropriate distance in all directions utilizing iris scissors. Pain Refusal Text: I offered to prescribe pain medication but the patient refused to take this medication. Bilobed Flap Text: The defect edges were debeveled with a #15 scalpel blade.  Given the location of the defect and the proximity to free margins a bilobe flap was deemed most appropriate.  Using a sterile surgical marker, an appropriate bilobe flap drawn around the defect.    The area thus outlined was incised deep to adipose tissue with a #15 scalpel blade.  The skin margins were undermined to an appropriate distance in all directions utilizing iris scissors. Consent (Lip)/Introductory Paragraph: The rationale for Mohs was explained to the patient and consent was obtained. The risks, benefits and alternatives to therapy were discussed in detail. Specifically, the risks of lip deformity, changes in the oral aperture, infection, scarring, bleeding, prolonged wound healing, incomplete removal, allergy to anesthesia, nerve injury and recurrence were addressed. Prior to the procedure, the treatment site was clearly identified and confirmed by the patient. All components of Universal Protocol/PAUSE Rule completed. Vermilion Border Text: The closure involved the vermilion border. Zygomaticofacial Flap Text: Given the location of the defect, shape of the defect and the proximity to free margins a zygomaticofacial flap was deemed most appropriate for repair.  Using a sterile surgical marker, the appropriate flap was drawn incorporating the defect and placing the expected incisions within the relaxed skin tension lines where possible. The area thus outlined was incised deep to adipose tissue with a #15 scalpel blade with preservation of a vascular pedicle.  The skin margins were undermined to an appropriate distance in all directions utilizing iris scissors.  The flap was then placed into the defect and anchored with interrupted buried subcutaneous sutures. Donor Site Anesthesia Type: same as repair anesthesia Dermal Autograft Text: The defect edges were debeveled with a #15 scalpel blade.  Given the location of the defect, shape of the defect and the proximity to free margins a dermal autograft was deemed most appropriate.  Using a sterile surgical marker, the primary defect shape was transferred to the donor site. The area thus outlined was incised deep to adipose tissue with a #15 scalpel blade.  The harvested graft was then trimmed of adipose and epidermal tissue until only dermis was left.  The skin graft was then placed in the primary defect and oriented appropriately. Advancement-Rotation Flap Text: The defect edges were debeveled with a #15 scalpel blade.  Given the location of the defect, shape of the defect and the proximity to free margins an advancement-rotation flap was deemed most appropriate.  Using a sterile surgical marker, an appropriate flap was drawn incorporating the defect and placing the expected incisions within the relaxed skin tension lines where possible. The area thus outlined was incised deep to adipose tissue with a #15 scalpel blade.  The skin margins were undermined to an appropriate distance in all directions utilizing iris scissors. Tumor Depth: Less than 6mm from granular layer and no invasion beyond the subcutaneous fat Area H Indication Text: Tumors in this location are included in Area H (eyelids, eyebrows, nose, lips, chin, ear, pre-auricular, post-auricular, temple, genitalia, hands, feet, ankles and areola).  Tissue conservation is critical in these anatomic locations. Island Pedicle Flap-Requiring Vessel Identification Text: The defect edges were debeveled with a #15 scalpel blade.  Given the location of the defect, shape of the defect and the proximity to free margins an island pedicle advancement flap was deemed most appropriate.  Using a sterile surgical marker, an appropriate advancement flap was drawn, based on the axial vessel mentioned above, incorporating the defect, outlining the appropriate donor tissue and placing the expected incisions within the relaxed skin tension lines where possible.    The area thus outlined was incised deep to adipose tissue with a #15 scalpel blade.  The skin margins were undermined to an appropriate distance in all directions around the primary defect and laterally outward around the island pedicle utilizing iris scissors.  There was minimal undermining beneath the pedicle flap. Post-Care Instructions: I reviewed with the patient in detail post-care instructions. Patient is not to engage in any heavy lifting, exercise, or swimming for the next 7 days. Should the patient develop any fevers, chills, bleeding, severe pain patient will contact the office immediately. Additional Anesthesia Type: 0.5% marcaine Staging Info: By selecting yes to the question above you will include information on AJCC 8 tumor staging in your Mohs note. Information on tumor staging will be automatically added for SCCs on the head and neck. AJCC 8 includes tumor size, tumor depth, perineural involvement and bone invasion. Bcc Histology Text: There were numerous aggregates of basaloid cells. Is There Documentation In The Chart Showing Discussion Of Changes With Another Physician?: Please Select the Appropriate Response Manual Repair Warning Statement: We plan on removing the manually selected variable below in favor of our much easier automatic structured text blocks found in the previous tab. We decided to do this to help make the flow better and give you the full power of structured data. Manual selection is never going to be ideal in our platform and I would encourage you to avoid using manual selection from this point on, especially since I will be sunsetting this feature. It is important that you do one of two things with the customized text below. First, you can save all of the text in a word file so you can have it for future reference. Second, transfer the text to the appropriate area in the Library tab. Lastly, if there is a flap or graft type which we do not have you need to let us know right away so I can add it in before the variable is hidden. No need to panic, we plan to give you roughly 6 months to make the change. Surgeon: Christine Villarreal MD [facility] Intermediate Repair Preamble Text (Leave Blank If You Do Not Want): Undermining was performed with blunt dissection. Mercedes Flap Text: The defect edges were debeveled with a #15 scalpel blade.  Given the location of the defect, shape of the defect and the proximity to free margins a Mercedes flap was deemed most appropriate.  Using a sterile surgical marker, an appropriate advancement flap was drawn incorporating the defect and placing the expected incisions within the relaxed skin tension lines where possible. The area thus outlined was incised deep to adipose tissue with a #15 scalpel blade.  The skin margins were undermined to an appropriate distance in all directions utilizing iris scissors. Perineural Invasion (For Histology - Be Specific If Possible): absent O-L Flap Text: The defect edges were debeveled with a #15 scalpel blade.  Given the location of the defect, shape of the defect and the proximity to free margins an O-L flap was deemed most appropriate.  Using a sterile surgical marker, an appropriate advancement flap was drawn incorporating the defect and placing the expected incisions within the relaxed skin tension lines where possible.    The area thus outlined was incised deep to adipose tissue with a #15 scalpel blade.  The skin margins were undermined to an appropriate distance in all directions utilizing iris scissors. Consent (Ear)/Introductory Paragraph: The rationale for Mohs was explained to the patient and consent was obtained. The risks, benefits and alternatives to therapy were discussed in detail. Specifically, the risks of ear deformity, infection, scarring, bleeding, prolonged wound healing, incomplete removal, allergy to anesthesia, nerve injury and recurrence were addressed. Prior to the procedure, the treatment site was clearly identified and confirmed by the patient. All components of Universal Protocol/PAUSE Rule completed. Trilobed Flap Text: The defect edges were debeveled with a #15 scalpel blade.  Given the location of the defect and the proximity to free margins a trilobed flap was deemed most appropriate.  Using a sterile surgical marker, an appropriate trilobed flap drawn around the defect.    The area thus outlined was incised deep to adipose tissue with a #15 scalpel blade.  The skin margins were undermined to an appropriate distance in all directions utilizing iris scissors. Cheek Interpolation Flap Text: A decision was made to reconstruct the defect utilizing an interpolation axial flap and a staged reconstruction.  A telfa template was made of the defect.  This telfa template was then used to outline the Cheek Interpolation flap.  The donor area for the pedicle flap was then injected with anesthesia.  The flap was excised through the skin and subcutaneous tissue down to the layer of the underlying musculature.  The interpolation flap was carefully excised within this deep plane to maintain its blood supply.  The edges of the donor site were undermined.   The donor site was closed in a primary fashion.  The pedicle was then rotated into position and sutured.  Once the tube was sutured into place, adequate blood supply was confirmed with blanching and refill.  The pedicle was then wrapped with xeroform gauze and dressed appropriately with a telfa and gauze bandage to ensure continued blood supply and protect the attached pedicle. Modified Advancement Flap Text: The defect edges were debeveled with a #15 scalpel blade.  Given the location of the defect, shape of the defect and the proximity to free margins a modified advancement flap was deemed most appropriate.  Using a sterile surgical marker, an appropriate advancement flap was drawn incorporating the defect and placing the expected incisions within the relaxed skin tension lines where possible.    The area thus outlined was incised deep to adipose tissue with a #15 scalpel blade.  The skin margins were undermined to an appropriate distance in all directions utilizing iris scissors. Subsequent Stages Histo Method Verbiage: Using a similar technique to that described above, a thin layer of tissue was removed from all areas where tumor was visible on the previous stage.  The tissue was again oriented, mapped, dyed, and processed as above. Mart-1 - Positive Histology Text: MART-1 staining demonstrates areas of higher density and clustering of melanocytes with Pagetoid spread upwards within the epidermis. The surgical margins are positive for tumor cells. Mohs Case Number: [id number] Tissue Cultured Epidermal Autograft Text: The defect edges were debeveled with a #15 scalpel blade.  Given the location of the defect, shape of the defect and the proximity to free margins a tissue cultured epidermal autograft was deemed most appropriate.  The graft was then trimmed to fit the size of the defect.  The graft was then placed in the primary defect and oriented appropriately. Retention Suture Bite Size: 3 mm Tumor Debulked?: not debulked Full Thickness Lip Wedge Repair (Flap) Text: Given the location of the defect and the proximity to free margins a full thickness wedge repair was deemed most appropriate.  Using a sterile surgical marker, the appropriate repair was drawn incorporating the defect and placing the expected incisions perpendicular to the vermillion border.  The vermillion border was also meticulously outlined to ensure appropriate reapproximation during the repair.  The area thus outlined was incised through and through with a #15 scalpel blade.  The muscularis and dermis were reaproximated with deep sutures following hemostasis. Care was taken to realign the vermillion border before proceeding with the superficial closure.  Once the vermillion was realigned the superfical and mucosal closure was finished. Ear Star Wedge Flap Text: The defect edges were debeveled with a #15 blade scalpel.  Given the location of the defect and the proximity to free margins (helical rim) an ear star wedge flap was deemed most appropriate.  Using a sterile surgical marker, the appropriate flap was drawn incorporating the defect and placing the expected incisions between the helical rim and antihelix where possible.  The area thus outlined was incised through and through with a #15 scalpel blade. Consent (Nose)/Introductory Paragraph: The rationale for Mohs was explained to the patient and consent was obtained. The risks, benefits and alternatives to therapy were discussed in detail. Specifically, the risks of nasal deformity, changes in the flow of air through the nose, infection, scarring, bleeding, prolonged wound healing, incomplete removal, allergy to anesthesia, nerve injury and recurrence were addressed. Prior to the procedure, the treatment site was clearly identified and confirmed by the patient. All components of Universal Protocol/PAUSE Rule completed. Complex Repair And Graft Additional Text (Will Appearing After The Standard Complex Repair Text): The complex repair was not sufficient to completely close the primary defect. The remaining additional defect was repaired with the graft mentioned below. Information: Selecting Yes will display possible errors in your note based on the variables you have selected. This validation is only offered as a suggestion for you. PLEASE NOTE THAT THE VALIDATION TEXT WILL BE REMOVED WHEN YOU FINALIZE YOUR NOTE. IF YOU WANT TO FAX A PRELIMINARY NOTE YOU WILL NEED TO TOGGLE THIS TO 'NO' IF YOU DO NOT WANT IT IN YOUR FAXED NOTE. Partial Purse String (Intermediate) Text: Given the location of the defect and the characteristics of the surrounding skin an intermediate purse string closure was deemed most appropriate.  Undermining was performed circumfirentially around the surgical defect.  A purse string suture was then placed and tightened. Wound tension only allowed a partial closure of the circular defect. Mohs Method Verbiage: An incision at a 45 degree angle following the standard Mohs approach was done and the specimen was harvested as a microscopic controlled layer. Suturegard Intro: Intraoperative tissue expansion was performed, utilizing the SUTUREGARD device, in order to reduce wound tension. Consent (Temporal Branch)/Introductory Paragraph: The rationale for Mohs was explained to the patient and consent was obtained. The risks, benefits and alternatives to therapy were discussed in detail. Specifically, the risks of damage to the temporal branch of the facial nerve, infection, scarring, bleeding, prolonged wound healing, incomplete removal, allergy to anesthesia, and recurrence were addressed. Prior to the procedure, the treatment site was clearly identified and confirmed by the patient. All components of Universal Protocol/PAUSE Rule completed. X Size Of Lesion In Cm (Optional): 0.6 Retention Suture Text: Retention sutures were placed to support the closure and prevent dehiscence. Ftsg Text: The defect edges were debeveled with a #15 scalpel blade.  Given the location of the defect, shape of the defect and the proximity to free margins a full thickness skin graft was deemed most appropriate.  Using a sterile surgical marker, the primary defect shape was transferred to the donor site. The area thus outlined was incised deep to adipose tissue with a #15 scalpel blade.  The harvested graft was then trimmed of adipose tissue until only dermis and epidermis was left.  The skin margins of the secondary defect were undermined to an appropriate distance in all directions utilizing iris scissors.  The secondary defect was closed with interrupted buried subcutaneous sutures.  The skin edges were then re-apposed with running  sutures.  The skin graft was then placed in the primary defect and oriented appropriately. Helical Rim Text: The closure involved the helical rim. Inflammation Suggestive Of Cancer Camouflage Histology Text: There was a dense lymphocytic infiltrate which prevented adequate histologic evaluation of adjacent structures. Epidermal Autograft Text: The defect edges were debeveled with a #15 scalpel blade.  Given the location of the defect, shape of the defect and the proximity to free margins an epidermal autograft was deemed most appropriate.  Using a sterile surgical marker, the primary defect shape was transferred to the donor site. The epidermal graft was then harvested.  The skin graft was then placed in the primary defect and oriented appropriately. V-Y Flap Text: The defect edges were debeveled with a #15 scalpel blade.  Given the location of the defect, shape of the defect and the proximity to free margins a V-Y flap was deemed most appropriate.  Using a sterile surgical marker, an appropriate advancement flap was drawn incorporating the defect and placing the expected incisions within the relaxed skin tension lines where possible.    The area thus outlined was incised deep to adipose tissue with a #15 scalpel blade.  The skin margins were undermined to an appropriate distance in all directions utilizing iris scissors. Localized Dermabrasion With Wire Brush Text: The patient was draped in routine manner.  Localized dermabrasion using 3 x 17 mm wire brush was performed in routine manner to papillary dermis. This spot dermabrasion is being performed to complete skin cancer reconstruction. It also will eliminate the other sun damaged precancerous cells that are known to be part of the regional effect of a lifetime's worth of sun exposure. This localized dermabrasion is therapeutic and should not be considered cosmetic in any regard. Banner Transposition Flap Text: The defect edges were debeveled with a #15 scalpel blade.  Given the location of the defect and the proximity to free margins a Banner transposition flap was deemed most appropriate.  Using a sterile surgical marker, an appropriate flap drawn around the defect. The area thus outlined was incised deep to adipose tissue with a #15 scalpel blade.  The skin margins were undermined to an appropriate distance in all directions utilizing iris scissors. Consent 1/Introductory Paragraph: The rationale for Mohs was explained to the patient and consent was obtained. The risks, benefits and alternatives to therapy were discussed in detail. Specifically, the risks of infection, scarring, bleeding, prolonged wound healing, incomplete removal, allergy to anesthesia, nerve injury and recurrence were addressed. Prior to the procedure, the treatment site was clearly identified and confirmed by the patient. All components of Universal Protocol/PAUSE Rule completed. Split-Thickness Skin Graft Text: The defect edges were debeveled with a #15 scalpel blade.  Given the location of the defect, shape of the defect and the proximity to free margins a split thickness skin graft was deemed most appropriate.  Using a sterile surgical marker, the primary defect shape was transferred to the donor site. The split thickness graft was then harvested.  The skin graft was then placed in the primary defect and oriented appropriately. Non-Graft Cartilage Fenestration Text: The cartilage was fenestrated with a 2mm punch biopsy to help facilitate healing. Nasalis-Muscle-Based Myocutaneous Island Pedicle Flap Text: Using a #15 blade, an incision was made around the donor flap to the level of the nasalis muscle. Wide lateral undermining was then performed in both the subcutaneous plane above the nasalis muscle, and in a submuscular plane just above periosteum. This allowed the formation of a free nasalis muscle axial pedicle (based on the angular artery) which was still attached to the actual cutaneous flap, increasing its mobility and vascular viability. Hemostasis was obtained with pinpoint electrocoagulation. The flap was mobilized into position and the pivotal anchor points positioned and stabilized with buried interrupted sutures. Subcutaneous and dermal tissues were closed in a multilayered fashion with sutures. Tissue redundancies were excised, and the epidermal edges were apposed without significant tension and sutured with sutures. Purse String (Simple) Text: Given the location of the defect and the characteristics of the surrounding skin a pursestring closure was deemed most appropriate.  Undermining was performed circumfirentially around the surgical defect.  A purstring suture was then placed and tightened. Advancement Flap (Double) Text: The defect edges were debeveled with a #15 scalpel blade.  Given the location of the defect and the proximity to free margins a double advancement flap was deemed most appropriate.  Using a sterile surgical marker, the appropriate advancement flaps were drawn incorporating the defect and placing the expected incisions within the relaxed skin tension lines where possible.    The area thus outlined was incised deep to adipose tissue with a #15 scalpel blade.  The skin margins were undermined to an appropriate distance in all directions utilizing iris scissors. Cheiloplasty (Complex) Text: A decision was made to reconstruct the defect with a  cheiloplasty.  The defect was undermined extensively.  Additional obicularis oris muscle was excised with a 15 blade scalpel.  The defect was converted into a full thickness wedge to facilite a better cosmetic result.  Small vessels were then tied off with 5-0 monocyrl. The obicularis oris, superficial fascia, adipose and dermis were then reapproximated.  After the deeper layers were approximated the epidermis was reapproximated with particular care given to realign the vermillion border. Tarsorrhaphy Text: A tarsorrhaphy was performed using Frost sutures. Consent (Near Eyelid Margin)/Introductory Paragraph: The rationale for Mohs was explained to the patient and consent was obtained. The risks, benefits and alternatives to therapy were discussed in detail. Specifically, the risks of ectropion or eyelid deformity, infection, scarring, bleeding, prolonged wound healing, incomplete removal, allergy to anesthesia, nerve injury and recurrence were addressed. Prior to the procedure, the treatment site was clearly identified and confirmed by the patient. All components of Universal Protocol/PAUSE Rule completed. Closure 2 Information: This tab is for additional flaps and grafts, including complex repair and grafts and complex repair and flaps. You can also specify a different location for the additional defect, if the location is the same you do not need to select a new one. We will insert the automated text for the repair you select below just as we do for solitary flaps and grafts. Please note that at this time if you select a location with a different insurance zone you will need to override the ICD10 and CPT if appropriate. O-T Advancement Flap Text: The defect edges were debeveled with a #15 scalpel blade.  Given the location of the defect, shape of the defect and the proximity to free margins an O-T advancement flap was deemed most appropriate.  Using a sterile surgical marker, an appropriate advancement flap was drawn incorporating the defect and placing the expected incisions within the relaxed skin tension lines where possible.    The area thus outlined was incised deep to adipose tissue with a #15 scalpel blade.  The skin margins were undermined to an appropriate distance in all directions utilizing iris scissors. Burow's Graft Text: The defect edges were debeveled with a #15 scalpel blade.  Given the location of the defect, shape of the defect, the proximity to free margins and the presence of a standing cone deformity a Burow's skin graft was deemed most appropriate. The standing cone was removed and this tissue was then trimmed to the shape of the primary defect. The adipose tissue was also removed until only dermis and epidermis were left.  The skin margins of the secondary defect were undermined to an appropriate distance in all directions utilizing iris scissors.  The secondary defect was closed with interrupted buried subcutaneous sutures.  The skin edges were then re-apposed with running  sutures.  The skin graft was then placed in the primary defect and oriented appropriately. Detail Level: Detailed Burow's Advancement Flap Text: The defect edges were debeveled with a #15 scalpel blade.  Given the location of the defect and the proximity to free margins a Burow's advancement flap was deemed most appropriate.  Using a sterile surgical marker, the appropriate advancement flap was drawn incorporating the defect and placing the expected incisions within the relaxed skin tension lines where possible.    The area thus outlined was incised deep to adipose tissue with a #15 scalpel blade.  The skin margins were undermined to an appropriate distance in all directions utilizing iris scissors. Orbicularis Oris Muscle Flap Text: The defect edges were debeveled with a #15 scalpel blade.  Given that the defect affected the competency of the oral sphincter an obicularis oris muscle flap was deemed most appropriate to restore this competency and normal muscle function.  Using a sterile surgical marker, an appropriate flap was drawn incorporating the defect. The area thus outlined was incised with a #15 scalpel blade. Ear Wedge Repair Text: A wedge excision was completed by carrying down an excision through the full thickness of the ear and cartilage with an inward facing Burow's triangle. The wound was then closed in a layered fashion. Skin Substitute Text: The defect edges were debeveled with a #15 scalpel blade.  Given the location of the defect, shape of the defect and the proximity to free margins a skin substitute graft was deemed most appropriate.  The graft material was trimmed to fit the size of the defect. The graft was then placed in the primary defect and oriented appropriately. Bilateral Helical Rim Advancement Flap Text: The defect edges were debeveled with a #15 blade scalpel.  Given the location of the defect and the proximity to free margins (helical rim) a bilateral helical rim advancement flap was deemed most appropriate.  Using a sterile surgical marker, the appropriate advancement flaps were drawn incorporating the defect and placing the expected incisions between the helical rim and antihelix where possible.  The area thus outlined was incised through and through with a #15 scalpel blade.  With a skin hook and iris scissors, the flaps were gently and sharply undermined and freed up. Star Wedge Flap Text: The defect edges were debeveled with a #15 scalpel blade.  Given the location of the defect, shape of the defect and the proximity to free margins a star wedge flap was deemed most appropriate.  Using a sterile surgical marker, an appropriate rotation flap was drawn incorporating the defect and placing the expected incisions within the relaxed skin tension lines where possible. The area thus outlined was incised deep to adipose tissue with a #15 scalpel blade.  The skin margins were undermined to an appropriate distance in all directions utilizing iris scissors. Cartilage Graft Text: The defect edges were debeveled with a #15 scalpel blade.  Given the location of the defect, shape of the defect, the fact the defect involved a full thickness cartilage defect a cartilage graft was deemed most appropriate.  An appropriate donor site was identified, cleansed, and anesthetized. The cartilage graft was then harvested and transferred to the recipient site, oriented appropriately and then sutured into place.  The secondary defect was then repaired using a primary closure. Eye Protection Verbiage: Before proceeding with the stage, a plastic scleral shield was inserted. The globe was anesthetized with a few drops of 1% lidocaine with 1:100,000 epinephrine. Then, an appropriate sized scleral shield was chosen and coated with lacrilube ointment. The shield was gently inserted and left in place for the duration of each stage. After the stage was completed, the shield was gently removed. Spiral Flap Text: The defect edges were debeveled with a #15 scalpel blade.  Given the location of the defect, shape of the defect and the proximity to free margins a spiral flap was deemed most appropriate.  Using a sterile surgical marker, an appropriate rotation flap was drawn incorporating the defect and placing the expected incisions within the relaxed skin tension lines where possible. The area thus outlined was incised deep to adipose tissue with a #15 scalpel blade.  The skin margins were undermined to an appropriate distance in all directions utilizing iris scissors. Melolabial Transposition Flap Text: The defect edges were debeveled with a #15 scalpel blade.  Given the location of the defect and the proximity to free margins a melolabial flap was deemed most appropriate.  Using a sterile surgical marker, an appropriate melolabial transposition flap was drawn incorporating the defect.    The area thus outlined was incised deep to adipose tissue with a #15 scalpel blade.  The skin margins were undermined to an appropriate distance in all directions utilizing iris scissors. No Repair - Repaired With Adjacent Surgical Defect Text (Leave Blank If You Do Not Want): After obtaining clear surgical margins the defect was repaired concurrently with another surgical defect which was in close approximation. Chonodrocutaneous Helical Advancement Flap Text: The defect edges were debeveled with a #15 scalpel blade.  Given the location of the defect and the proximity to free margins a chondrocutaneous helical advancement flap was deemed most appropriate.  Using a sterile surgical marker, the appropriate advancement flap was drawn incorporating the defect and placing the expected incisions within the relaxed skin tension lines where possible.    The area thus outlined was incised deep to adipose tissue with a #15 scalpel blade.  The skin margins were undermined to an appropriate distance in all directions utilizing iris scissors. Partial Purse String (Simple) Text: Given the location of the defect and the characteristics of the surrounding skin a simple purse string closure was deemed most appropriate.  Undermining was performed circumfirentially around the surgical defect.  A purse string suture was then placed and tightened. Wound tension only allowed a partial closure of the circular defect. Where Do You Want The Question To Include Opioid Counseling Located?: Case Summary Tab Paramedian Forehead Flap Text: A decision was made to reconstruct the defect utilizing an interpolation axial flap and a staged reconstruction.  A telfa template was made of the defect.  This telfa template was then used to outline the paramedian forehead pedicle flap.  The donor area for the pedicle flap was then injected with anesthesia.  The flap was excised through the skin and subcutaneous tissue down to the layer of the underlying musculature.  The pedicle flap was carefully excised within this deep plane to maintain its blood supply.  The edges of the donor site were undermined.   The donor site was closed in a primary fashion.  The pedicle was then rotated into position and sutured.  Once the tube was sutured into place, adequate blood supply was confirmed with blanching and refill.  The pedicle was then wrapped with xeroform gauze and dressed appropriately with a telfa and gauze bandage to ensure continued blood supply and protect the attached pedicle. No Residual Tumor Seen Histology Text: There were no malignant cells seen in the sections examined. Double O-Z Flap Text: The defect edges were debeveled with a #15 scalpel blade.  Given the location of the defect, shape of the defect and the proximity to free margins a Double O-Z flap was deemed most appropriate.  Using a sterile surgical marker, an appropriate transposition flap was drawn incorporating the defect and placing the expected incisions within the relaxed skin tension lines where possible. The area thus outlined was incised deep to adipose tissue with a #15 scalpel blade.  The skin margins were undermined to an appropriate distance in all directions utilizing iris scissors. Debridement Text: The wound edges were debrided prior to proceeding with the closure to facilitate wound healing. Repair Anesthesia Method: local infiltration Xenograft Text: The defect edges were debeveled with a #15 scalpel blade.  Given the location of the defect, shape of the defect and the proximity to free margins a xenograft was deemed most appropriate.  The graft was then trimmed to fit the size of the defect.  The graft was then placed in the primary defect and oriented appropriately. Alternatives Discussed Intro (Do Not Add Period): I discussed alternative treatments to Mohs surgery and specifically discussed the risks and benefits of Hemostasis: Electrocautery Nasal Turnover Hinge Flap Text: The defect edges were debeveled with a #15 scalpel blade.  Given the size, depth, location of the defect and the defect being full thickness a nasal turnover hinge flap was deemed most appropriate.  Using a sterile surgical marker, an appropriate hinge flap was drawn incorporating the defect. The area thus outlined was incised with a #15 scalpel blade. The flap was designed to recreate the nasal mucosal lining and the alar rim. The skin margins were undermined to an appropriate distance in all directions utilizing iris scissors. Postop Diagnosis: same Dressing: steri-strips and pressure dressing Hemigard Retention Suture: 2-0 Nylon Primary Defect Length In Cm (Final Defect Size - Required For Flaps/Grafts): 0.7 A-T Advancement Flap Text: The defect edges were debeveled with a #15 scalpel blade.  Given the location of the defect, shape of the defect and the proximity to free margins an A-T advancement flap was deemed most appropriate.  Using a sterile surgical marker, an appropriate advancement flap was drawn incorporating the defect and placing the expected incisions within the relaxed skin tension lines where possible.    The area thus outlined was incised deep to adipose tissue with a #15 scalpel blade.  The skin margins were undermined to an appropriate distance in all directions utilizing iris scissors. Unna Boot Text: An Unna boot was placed to help immobilize the limb and facilitate more rapid healing. Consent (Spinal Accessory)/Introductory Paragraph: The rationale for Mohs was explained to the patient and consent was obtained. The risks, benefits and alternatives to therapy were discussed in detail. Specifically, the risks of damage to the spinal accessory nerve, infection, scarring, bleeding, prolonged wound healing, incomplete removal, allergy to anesthesia, and recurrence were addressed. Prior to the procedure, the treatment site was clearly identified and confirmed by the patient. All components of Universal Protocol/PAUSE Rule completed. Melolabial Interpolation Flap Text: A decision was made to reconstruct the defect utilizing an interpolation axial flap and a staged reconstruction.  A telfa template was made of the defect.  This telfa template was then used to outline the melolabial interpolation flap.  The donor area for the pedicle flap was then injected with anesthesia.  The flap was excised through the skin and subcutaneous tissue down to the layer of the underlying musculature.  The pedicle flap was carefully excised within this deep plane to maintain its blood supply.  The edges of the donor site were undermined.   The donor site was closed in a primary fashion.  The pedicle was then rotated into position and sutured.  Once the tube was sutured into place, adequate blood supply was confirmed with blanching and refill.  The pedicle was then wrapped with xeroform gauze and dressed appropriately with a telfa and gauze bandage to ensure continued blood supply and protect the attached pedicle. Hatchet Flap Text: The defect edges were debeveled with a #15 scalpel blade.  Given the location of the defect, shape of the defect and the proximity to free margins a hatchet flap was deemed most appropriate.  Using a sterile surgical marker, an appropriate hatchet flap was drawn incorporating the defect and placing the expected incisions within the relaxed skin tension lines where possible.    The area thus outlined was incised deep to adipose tissue with a #15 scalpel blade.  The skin margins were undermined to an appropriate distance in all directions utilizing iris scissors. Consent (Scalp)/Introductory Paragraph: The rationale for Mohs was explained to the patient and consent was obtained. The risks, benefits and alternatives to therapy were discussed in detail. Specifically, the risks of changes in hair growth pattern secondary to repair, infection, scarring, bleeding, prolonged wound healing, incomplete removal, allergy to anesthesia, nerve injury and recurrence were addressed. Prior to the procedure, the treatment site was clearly identified and confirmed by the patient. All components of Universal Protocol/PAUSE Rule completed. Suturegard Body: The suture ends were repeatedly re-tightened and re-clamped to achieve the desired tissue expansion. Brow Lift Text: A midfrontal incision was made medially to the defect to allow access to the tissues just superior to the left eyebrow. Following careful dissection inferiorly in a supraperiosteal plane to the level of the left eyebrow, several 3-0 monocryl sutures were used to resuspend the eyebrow orbicularis oculi muscular unit to the superior frontal bone periosteum. This resulted in an appropriate reapproximation of static eyebrow symmetry and correction of the left brow ptosis. Helical Rim Advancement Flap Text: The defect edges were debeveled with a #15 blade scalpel.  Given the location of the defect and the proximity to free margins (helical rim) a double helical rim advancement flap was deemed most appropriate.  Using a sterile surgical marker, the appropriate advancement flaps were drawn incorporating the defect and placing the expected incisions between the helical rim and antihelix where possible.  The area thus outlined was incised through and through with a #15 scalpel blade.  With a skin hook and iris scissors, the flaps were gently and sharply undermined and freed up. Mastoid Interpolation Flap Text: A decision was made to reconstruct the defect utilizing an interpolation axial flap and a staged reconstruction.  A telfa template was made of the defect.  This telfa template was then used to outline the mastoid interpolation flap.  The donor area for the pedicle flap was then injected with anesthesia.  The flap was excised through the skin and subcutaneous tissue down to the layer of the underlying musculature.  The pedicle flap was carefully excised within this deep plane to maintain its blood supply.  The edges of the donor site were undermined.   The donor site was closed in a primary fashion.  The pedicle was then rotated into position and sutured.  Once the tube was sutured into place, adequate blood supply was confirmed with blanching and refill.  The pedicle was then wrapped with xeroform gauze and dressed appropriately with a telfa and gauze bandage to ensure continued blood supply and protect the attached pedicle. Rotation Flap Text: The defect edges were debeveled with a #15 scalpel blade.  Given the location of the defect, shape of the defect and the proximity to free margins a rotation flap was deemed most appropriate.  Using a sterile surgical marker, an appropriate rotation flap was drawn incorporating the defect and placing the expected incisions within the relaxed skin tension lines where possible.    The area thus outlined was incised deep to adipose tissue with a #15 scalpel blade.  The skin margins were undermined to an appropriate distance in all directions utilizing iris scissors. Purse String (Intermediate) Text: Given the location of the defect and the characteristics of the surrounding skin a pursestring intermediate closure was deemed most appropriate.  Undermining was performed circumfirentially around the surgical defect.  A purstring suture was then placed and tightened. Consent 3/Introductory Paragraph: I gave the patient a chance to ask questions they had about the procedure.  Following this I explained the Mohs procedure and consent was obtained. The risks, benefits and alternatives to therapy were discussed in detail. Specifically, the risks of infection, scarring, bleeding, prolonged wound healing, incomplete removal, allergy to anesthesia, nerve injury and recurrence were addressed. Prior to the procedure, the treatment site was clearly identified and confirmed by the patient. All components of Universal Protocol/PAUSE Rule completed. Location Indication Override (Is Already Calculated Based On Selected Body Location): Area M Secondary Intention Text (Leave Blank If You Do Not Want): The defect will heal with secondary intention. Complex Repair And Flap Additional Text (Will Appearing After The Standard Complex Repair Text): The complex repair was not sufficient to completely close the primary defect. The remaining additional defect was repaired with the flap mentioned below. Area L Indication Text: Tumors in this location are included in Area L (trunk and extremities).  Mohs surgery is indicated for larger tumors, 2 cm or larger, in these anatomic locations. Same Histology In Subsequent Stages Text: The pattern and morphology of the tumor is as described in the first stage. Bcc Infiltrative Histology Text: There were numerous aggregates of basaloid cells demonstrating an infiltrative pattern. Posterior Auricular Interpolation Flap Text: A decision was made to reconstruct the defect utilizing an interpolation axial flap and a staged reconstruction.  A telfa template was made of the defect.  This telfa template was then used to outline the posterior auricular interpolation flap.  The donor area for the pedicle flap was then injected with anesthesia.  The flap was excised through the skin and subcutaneous tissue down to the layer of the underlying musculature.  The pedicle flap was carefully excised within this deep plane to maintain its blood supply.  The edges of the donor site were undermined.   The donor site was closed in a primary fashion.  The pedicle was then rotated into position and sutured.  Once the tube was sutured into place, adequate blood supply was confirmed with blanching and refill.  The pedicle was then wrapped with xeroform gauze and dressed appropriately with a telfa and gauze bandage to ensure continued blood supply and protect the attached pedicle. W Plasty Text: The lesion was extirpated to the level of the fat with a #15 scalpel blade.  Given the location of the defect, shape of the defect and the proximity to free margins a W-plasty was deemed most appropriate for repair.  Using a sterile surgical marker, the appropriate transposition arms of the W-plasty were drawn incorporating the defect and placing the expected incisions within the relaxed skin tension lines where possible.    The area thus outlined was incised deep to adipose tissue with a #15 scalpel blade.  The skin margins were undermined to an appropriate distance in all directions utilizing iris scissors.  The opposing transposition arms were then transposed into place in opposite direction and anchored with interrupted buried subcutaneous sutures. O-Z Flap Text: The defect edges were debeveled with a #15 scalpel blade.  Given the location of the defect, shape of the defect and the proximity to free margins an O-Z flap was deemed most appropriate.  Using a sterile surgical marker, an appropriate transposition flap was drawn incorporating the defect and placing the expected incisions within the relaxed skin tension lines where possible. The area thus outlined was incised deep to adipose tissue with a #15 scalpel blade.  The skin margins were undermined to an appropriate distance in all directions utilizing iris scissors. Dorsal Nasal Flap Text: The defect edges were debeveled with a #15 scalpel blade.  Given the location of the defect and the proximity to free margins a dorsal nasal flap was deemed most appropriate.  Using a sterile surgical marker, an appropriate dorsal nasal flap was drawn around the defect.    The area thus outlined was incised deep to adipose tissue with a #15 scalpel blade.  The skin margins were undermined to an appropriate distance in all directions utilizing iris scissors. Closure 3 Information: This tab is for additional flaps and grafts above and beyond our usual structured repairs.  Please note if you enter information here it will not currently bill and you will need to add the billing information manually. O-Z Plasty Text: The defect edges were debeveled with a #15 scalpel blade.  Given the location of the defect, shape of the defect and the proximity to free margins an O-Z plasty (double transposition flap) was deemed most appropriate.  Using a sterile surgical marker, the appropriate transposition flaps were drawn incorporating the defect and placing the expected incisions within the relaxed skin tension lines where possible.    The area thus outlined was incised deep to adipose tissue with a #15 scalpel blade.  The skin margins were undermined to an appropriate distance in all directions utilizing iris scissors.  Hemostasis was achieved with electrocautery.  The flaps were then transposed into place, one clockwise and the other counterclockwise, and anchored with interrupted buried subcutaneous sutures. Deep Sutures: 6-0 Monocryl Medical Necessity Statement: Based on my medical judgement, Mohs surgery is the most appropriate treatment for this cancer compared to other treatments. Z Plasty Text: The lesion was extirpated to the level of the fat with a #15 scalpel blade.  Given the location of the defect, shape of the defect and the proximity to free margins a Z-plasty was deemed most appropriate for repair.  Using a sterile surgical marker, the appropriate transposition arms of the Z-plasty were drawn incorporating the defect and placing the expected incisions within the relaxed skin tension lines where possible.    The area thus outlined was incised deep to adipose tissue with a #15 scalpel blade.  The skin margins were undermined to an appropriate distance in all directions utilizing iris scissors.  The opposing transposition arms were then transposed into place in opposite direction and anchored with interrupted buried subcutaneous sutures. Muscle Hinge Flap Text: The defect edges were debeveled with a #15 scalpel blade.  Given the size, depth and location of the defect and the proximity to free margins a muscle hinge flap was deemed most appropriate.  Using a sterile surgical marker, an appropriate hinge flap was drawn incorporating the defect. The area thus outlined was incised with a #15 scalpel blade.  The skin margins were undermined to an appropriate distance in all directions utilizing iris scissors. Consent Type: Consent 1 (Standard) Double O-Z Plasty Text: The defect edges were debeveled with a #15 scalpel blade.  Given the location of the defect, shape of the defect and the proximity to free margins a Double O-Z plasty (double transposition flap) was deemed most appropriate.  Using a sterile surgical marker, the appropriate transposition flaps were drawn incorporating the defect and placing the expected incisions within the relaxed skin tension lines where possible. The area thus outlined was incised deep to adipose tissue with a #15 scalpel blade.  The skin margins were undermined to an appropriate distance in all directions utilizing iris scissors.  Hemostasis was achieved with electrocautery.  The flaps were then transposed into place, one clockwise and the other counterclockwise, and anchored with interrupted buried subcutaneous sutures. Interpolation Flap Text: A decision was made to reconstruct the defect utilizing an interpolation axial flap and a staged reconstruction.  A telfa template was made of the defect.  This telfa template was then used to outline the interpolation flap.  The donor area for the pedicle flap was then injected with anesthesia.  The flap was excised through the skin and subcutaneous tissue down to the layer of the underlying musculature.  The interpolation flap was carefully excised within this deep plane to maintain its blood supply.  The edges of the donor site were undermined.   The donor site was closed in a primary fashion.  The pedicle was then rotated into position and sutured.  Once the tube was sutured into place, adequate blood supply was confirmed with blanching and refill.  The pedicle was then wrapped with xeroform gauze and dressed appropriately with a telfa and gauze bandage to ensure continued blood supply and protect the attached pedicle. Hemigard Intro: Due to skin fragility and wound tension, it was decided to use HEMIGARD adhesive retention suture devices to permit a linear closure. The skin was cleaned and dried for a 6cm distance away from the wound. Excessive hair, if present, was removed to allow for adhesion. Crescentic Advancement Flap Text: The defect edges were debeveled with a #15 scalpel blade.  Given the location of the defect and the proximity to free margins a crescentic advancement flap was deemed most appropriate.  Using a sterile surgical marker, the appropriate advancement flap was drawn incorporating the defect and placing the expected incisions within the relaxed skin tension lines where possible.    The area thus outlined was incised deep to adipose tissue with a #15 scalpel blade.  The skin margins were undermined to an appropriate distance in all directions utilizing iris scissors. Advancement Flap (Single) Text: The defect edges were debeveled with a #15 scalpel blade.  Given the location of the defect and the proximity to free margins a single advancement flap was deemed most appropriate.  Using a sterile surgical marker, an appropriate advancement flap was drawn incorporating the defect and placing the expected incisions within the relaxed skin tension lines where possible.    The area thus outlined was incised deep to adipose tissue with a #15 scalpel blade.  The skin margins were undermined to an appropriate distance in all directions utilizing iris scissors. Mohs Histo Method Verbiage: Each section was then chromacoded and processed in the Mohs lab using the Mohs protocol and submitted for frozen section. Cheiloplasty (Less Than 50%) Text: A decision was made to reconstruct the defect with a  cheiloplasty.  The defect was undermined extensively.  Additional obicularis oris muscle was excised with a 15 blade scalpel.  The defect was converted into a full thickness wedge, of less than 50% of the vertical height of the lip, to facilite a better cosmetic result.  Small vessels were then tied off with 5-0 monocyrl. The obicularis oris, superficial fascia, adipose and dermis were then reapproximated.  After the deeper layers were approximated the epidermis was reapproximated with particular care given to realign the vermillion border. Epidermal Sutures: 6-0 Prolene Island Pedicle Flap Text: The defect edges were debeveled with a #15 scalpel blade.  Given the location of the defect, shape of the defect and the proximity to free margins an island pedicle advancement flap was deemed most appropriate.  Using a sterile surgical marker, an appropriate advancement flap was drawn incorporating the defect, outlining the appropriate donor tissue and placing the expected incisions within the relaxed skin tension lines where possible.    The area thus outlined was incised deep to adipose tissue with a #15 scalpel blade.  The skin margins were undermined to an appropriate distance in all directions around the primary defect and laterally outward around the island pedicle utilizing iris scissors.  There was minimal undermining beneath the pedicle flap. Bi-Rhombic Flap Text: The defect edges were debeveled with a #15 scalpel blade.  Given the location of the defect and the proximity to free margins a bi-rhombic flap was deemed most appropriate.  Using a sterile surgical marker, an appropriate rhombic flap was drawn incorporating the defect. The area thus outlined was incised deep to adipose tissue with a #15 scalpel blade.  The skin margins were undermined to an appropriate distance in all directions utilizing iris scissors. Undermining Type: Entire Wound V-Y Plasty Text: The defect edges were debeveled with a #15 scalpel blade.  Given the location of the defect, shape of the defect and the proximity to free margins an V-Y advancement flap was deemed most appropriate.  Using a sterile surgical marker, an appropriate advancement flap was drawn incorporating the defect and placing the expected incisions within the relaxed skin tension lines where possible.    The area thus outlined was incised deep to adipose tissue with a #15 scalpel blade.  The skin margins were undermined to an appropriate distance in all directions utilizing iris scissors. Consent 2/Introductory Paragraph: Mohs surgery was explained to the patient and consent was obtained. The risks, benefits and alternatives to therapy were discussed in detail. Specifically, the risks of infection, scarring, bleeding, prolonged wound healing, incomplete removal, allergy to anesthesia, nerve injury and recurrence were addressed. Prior to the procedure, the treatment site was clearly identified and confirmed by the patient. All components of Universal Protocol/PAUSE Rule completed. Mauc Instructions: By selecting yes to the question below the MAUC number will be added into the note.  This will be calculated automatically based on the diagnosis chosen, the size entered, the body zone selected (H,M,L) and the specific indications you chose. You will also have the option to override the Mohs AUC if you disagree with the automatically calculated number and this option is found in the Case Summary tab. Bilobed Transposition Flap Text: The defect edges were debeveled with a #15 scalpel blade.  Given the location of the defect and the proximity to free margins a bilobed transposition flap was deemed most appropriate.  Using a sterile surgical marker, an appropriate bilobe flap drawn around the defect.    The area thus outlined was incised deep to adipose tissue with a #15 scalpel blade.  The skin margins were undermined to an appropriate distance in all directions utilizing iris scissors. Nostril Rim Text: The closure involved the nostril rim. Graft Cartilage Fenestration Text: The cartilage was fenestrated with a 2mm punch biopsy to help facilitate graft survival and healing. Area M Indication Text: Tumors in this location are included in Area M (cheek, forehead, scalp, neck, jawline and pretibial skin).  Mohs surgery is indicated for tumors 1 cm or larger in these anatomic locations. Keystone Flap Text: The defect edges were debeveled with a #15 scalpel blade.  Given the location of the defect, shape of the defect a keystone flap was deemed most appropriate.  Using a sterile surgical marker, an appropriate keystone flap was drawn incorporating the defect, outlining the appropriate donor tissue and placing the expected incisions within the relaxed skin tension lines where possible. The area thus outlined was incised deep to adipose tissue with a #15 scalpel blade.  The skin margins were undermined to an appropriate distance in all directions around the primary defect and laterally outward around the flap utilizing iris scissors. Suture Removal: 7 days H Plasty Text: Given the location of the defect, shape of the defect and the proximity to free margins a H-plasty was deemed most appropriate for repair.  Using a sterile surgical marker, the appropriate advancement arms of the H-plasty were drawn incorporating the defect and placing the expected incisions within the relaxed skin tension lines where possible. The area thus outlined was incised deep to adipose tissue with a #15 scalpel blade. The skin margins were undermined to an appropriate distance in all directions utilizing iris scissors.  The opposing advancement arms were then advanced into place in opposite direction and anchored with interrupted buried subcutaneous sutures. Length To Time In Minutes Device Was In Place: 10 Island Pedicle Flap With Canthal Suspension Text: The defect edges were debeveled with a #15 scalpel blade.  Given the location of the defect, shape of the defect and the proximity to free margins an island pedicle advancement flap was deemed most appropriate.  Using a sterile surgical marker, an appropriate advancement flap was drawn incorporating the defect, outlining the appropriate donor tissue and placing the expected incisions within the relaxed skin tension lines where possible. The area thus outlined was incised deep to adipose tissue with a #15 scalpel blade.  The skin margins were undermined to an appropriate distance in all directions around the primary defect and laterally outward around the island pedicle utilizing iris scissors.  There was minimal undermining beneath the pedicle flap. A suspension suture was placed in the canthal tendon to prevent tension and prevent ectropion. O-T Plasty Text: The defect edges were debeveled with a #15 scalpel blade.  Given the location of the defect, shape of the defect and the proximity to free margins an O-T plasty was deemed most appropriate.  Using a sterile surgical marker, an appropriate O-T plasty was drawn incorporating the defect and placing the expected incisions within the relaxed skin tension lines where possible.    The area thus outlined was incised deep to adipose tissue with a #15 scalpel blade.  The skin margins were undermined to an appropriate distance in all directions utilizing iris scissors. Simple / Intermediate / Complex Repair - Final Wound Length In Cm: 1.2 Estimated Blood Loss (Cc): minimal Wound Care: Aquaphor

## 2021-07-12 NOTE — ED ADULT NURSE NOTE - NS ED NURSE DC INFO COMPLEXITY
"Subjective:      34 year old  at 20w2d presents for a routine prenatal appointment.       Denies vaginal bleeding or leakage of fluid.  Denie contractions or cramping.    Has not yet started to feel fetal movement.  Anterior placenta.     No HA, visual changes, RUQ or epigastric pain.   The patient presents with the following concerns: no concerns today   Level II US:  Normal views, follow up planned for incomplete facial views.  Has ECHO  scheduled       Objective:  Vitals:    21 1509   BP: 135/74   Pulse: 85   Weight: 77.3 kg (170 lb 6.4 oz)   Height: 1.575 m (5' 2\")     See OB flowsheet    Assessment/Plan     Encounter Diagnosis   Name Primary?     High-risk pregnancy in third trimester Yes     No orders of the defined types were placed in this encounter.    No orders of the defined types were placed in this encounter.      - Reviewed total weight gain, encouraged continued healthy diet and exercise.      - Reviewed why/how to contact provider.    Patient education/orders or handouts today:  PTL signs/symptoms and fetal movement   Return to clinic in 4 weeks and prn if questions or concerns.   RERE ChanM              " Simple: Patient demonstrates quick and easy understanding/Verbalized Understanding

## 2021-08-24 NOTE — PROGRESS NOTE ADULT - PROBLEM SELECTOR PLAN 2
[id number] To recovery via cart. Spont resp. VSS. Report received from surgical rn. Pt denies pain nausea numbness or tingling. Snack and rink given. Call bell in reach. HOB elevated. 9251 IV discontinued.  To dress self  [phone] Discharge to home in stable condition per wheelchair with  Pt with ESRD on HD, last (full session) on 8/09/19. Unclear etiology of ESRD.   -renal following  -HD scheduled for today
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
## 2021-09-03 NOTE — ED PROVIDER NOTE - NSFOLLOWUPINSTRUCTIONS_ED_ALL_ED_FT
eMERGENCY dEPARTMENT eNCOUnter        CHIEF COMPLAINT    Chief Complaint   Patient presents with    Urinary Retention       HPI    Mateo Baird is a 27 y.o. female who presents with difficulties urinating since 5pm.  Patient is concerned she may have a kidney stone like she has in the past.  She denies any hematuria or dysuria, just states she can't urinate. She denies abdominal pain. She reports she has some bilateral flank pain, severity 10/10. Denies fever, chills, cp, sob, n/v/d. She is requesting to just be cathed. She is well known to our ED for multiple complaints. REVIEW OF SYSTEMS    See HPI for further details. Review of systems otherwise negative. Constitutional:  Denies fever. HENT:  Denies headache, denies dizziness/lightheadedness. Respiratory:  Denies shortness of breath. Cardiovascular:  Denies chest pain. GI:  Denies abdominal pain, denies nausea, denies vomiting, denies diarrhea. :  + flank pain.  + retention, denies dysuria, denies frequency, denies urgency, denies hematuria. Musculoskeletal:  Denies edema. PAST MEDICAL HISTORY    Past Medical History:   Diagnosis Date    Active labor 3/18/2012    Delivery by elective caesarean section 3/18/2012    First pregnancy 3/18/2012    GERD (gastroesophageal reflux disease)     Insufficient prenatal care 3/18/2012    Sterilization 3/18/2012       SURGICAL HISTORY    No past surgical history on file.     CURRENT MEDICATIONS    Current Outpatient Rx   Medication Sig Dispense Refill    famotidine (PEPCID) 20 MG tablet TAKE 1 TABLET BY MOUTH 2 TIMES A DAY 60 tablet 0    hydrOXYzine (VISTARIL) 50 MG capsule TAKE 1 CAPSULE BY MOUTH 2 TIMES A DAY 62 capsule 0    ibuprofen (IBU) 600 MG tablet Take 1 tablet by mouth every 6 hours as needed for Pain 20 tablet 0    metoprolol succinate (TOPROL XL) 50 MG extended release tablet TAKE 1 TABLET BY MOUTH ONCE DAILY 31 tablet 10    loratadine (CLARITIN) 10 MG tablet TAKE 1 TABLET BY MOUTH ONCE DAILY 31 tablet 10    FLUoxetine (PROZAC) 40 MG capsule TAKE 1 CAPSULE BY MOUTH ONCE DAILY 31 capsule 10    traZODone (DESYREL) 50 MG tablet TAKE 1 TABLET BY MOUTH NIGHTLY AS NEEDED FOR SLEEP 31 tablet 1    Calcium Carbonate-Vitamin D (OYSTER SHELL CALCIUM/D) 500-200 MG-UNIT TABS TAKE 1 TABLET BY MOUTH 2 TIMES A DAY 62 tablet 1    naproxen (NAPROSYN) 500 MG tablet Take 1 tablet by mouth 2 times daily as needed for Pain 14 tablet 0    Calcium Carb-Cholecalciferol (OYSTER SHELL CALCIUM/VITAMIN D) 500-200 MG-UNIT TABS TAKE 1 TABLET BY MOUTH 2 TIMES A DAY 62 tablet 2    acetaminophen (APAP EXTRA STRENGTH) 500 MG tablet Take 1 tablet by mouth every 6 hours as needed for Pain 20 tablet 0    sodium chloride (ALTAMIST SPRAY) 0.65 % nasal spray 1 spray by Nasal route as needed for Congestion 1 Bottle 3    albuterol sulfate  (90 Base) MCG/ACT inhaler Inhale 2 puffs into the lungs every 6 hours as needed for Wheezing or Shortness of Breath (or cough) Please include spacer with instructions for use. 1 Inhaler 0    dicyclomine (BENTYL) 10 MG capsule Take 2 capsules by mouth 4 times daily (before meals and nightly) 30 capsule 0    ondansetron (ZOFRAN ODT) 4 MG disintegrating tablet Take 1 tablet by mouth every 8 hours as needed for Nausea or Vomiting 10 tablet 0    topiramate (TOPAMAX) 50 MG tablet Take 1 tablet by mouth 2 times daily 60 tablet 5    nicotine (NICODERM CQ) 14 MG/24HR Place 1 patch onto the skin daily (Patient not taking: Reported on 11/23/2020) 42 patch 0    nicotine (NICODERM CQ) 7 MG/24HR Place 1 patch onto the skin daily for 14 days (Patient not taking: Reported on 11/23/2020) 14 patch 0    risperiDONE (RISPERDAL) 1 MG tablet Take 0.5 mg by mouth 2 times daily       sertraline (ZOLOFT) 50 MG tablet Take 3 tablets by mouth daily. 90 tablet 0    cetirizine (ZYRTEC) 10 MG tablet Take 10 mg by mouth daily.  omeprazole (PRILOSEC) 20 MG capsule Take 20 mg by mouth daily. ALLERGIES    No Known Allergies    FAMILY HISTORY    Family History   Problem Relation Age of Onset    Cancer Mother     Diabetes Maternal Grandmother     Cancer Maternal Grandfather        SOCIAL HISTORY    Social History     Socioeconomic History    Marital status: Single     Spouse name: Not on file    Number of children: Not on file    Years of education: Not on file    Highest education level: Not on file   Occupational History    Occupation: Disabled   Tobacco Use    Smoking status: Current Every Day Smoker     Packs/day: 0.25     Years: 2.00     Pack years: 0.50    Smokeless tobacco: Never Used   Vaping Use    Vaping Use: Every day    Substances: Unknown   Substance and Sexual Activity    Alcohol use: No    Drug use: No    Sexual activity: Not Currently   Other Topics Concern    Not on file   Social History Narrative    ** Merged History Encounter **          Social Determinants of Health     Financial Resource Strain:     Difficulty of Paying Living Expenses:    Food Insecurity:     Worried About Running Out of Food in the Last Year:     Ran Out of Food in the Last Year:    Transportation Needs:     Lack of Transportation (Medical):      Lack of Transportation (Non-Medical):    Physical Activity:     Days of Exercise per Week:     Minutes of Exercise per Session:    Stress:     Feeling of Stress :    Social Connections:     Frequency of Communication with Friends and Family:     Frequency of Social Gatherings with Friends and Family:     Attends Scientologist Services:     Active Member of Clubs or Organizations:     Attends Club or Organization Meetings:     Marital Status:    Intimate Partner Violence:     Fear of Current or Ex-Partner:     Emotionally Abused:     Physically Abused:     Sexually Abused:        PHYSICAL EXAM    VITAL SIGNS: /77   Pulse 81   Temp 98 °F (36.7 °C) (Oral)   Resp 16   Ht 5' (1.524 m)   Wt 250 lb (113.4 kg)   SpO2 100%   BMI 48.82 kg/m²   Constitutional:  Well developed, well nourished, no acute distress, non-toxic appearance   Eyes:  PERRL, EOMI. Conjunctiva normal.  HENT:  NC/AT. Ears, nose, mouth normal.  Respiratory:  Lungs CTAB. Cardiovascular:  RRR. GI:  Abdomen soft, non-distended, non-tender. :  No CVA tenderness. Musculoskeletal:  No edema, no tenderness. Integument:  Well hydrated. RADIOLOGY/PROCEDURES    Labs Reviewed   URINE RT REFLEX TO CULTURE - Abnormal; Notable for the following components:       Result Value    Clarity, UA SLIGHTLY CLOUDY (*)     Bacteria, UA RARE (*)     Mucus, UA RARE (*)     All other components within normal limits   CBC WITH AUTO DIFFERENTIAL - Abnormal; Notable for the following components:    WBC 10.6 (*)     MCHC 31.1 (*)     Monocytes % 7.3 (*)     Immature Neutrophil % 0.6 (*)     All other components within normal limits   HCG, SERUM, QUALITATIVE   COMPREHENSIVE METABOLIC PANEL W/ REFLEX TO MG FOR LOW K     CT ABDOMEN PELVIS WO CONTRAST Additional Contrast? None    Result Date: 9/3/2021  EXAMINATION: CT OF THE ABDOMEN AND PELVIS WITHOUT CONTRAST 9/3/2021 2:40 am TECHNIQUE: CT of the abdomen and pelvis was performed without the administration of intravenous contrast. Multiplanar reformatted images are provided for review. Dose modulation, iterative reconstruction, and/or weight based adjustment of the mA/kV was utilized to reduce the radiation dose to as low as reasonably achievable. COMPARISON: 01/03/2020 HISTORY: ORDERING SYSTEM PROVIDED HISTORY: right flank pain TECHNOLOGIST PROVIDED HISTORY: Reason for exam:->right flank pain Additional Contrast?->None Decision Support Exception - unselect if not a suspected or confirmed emergency medical condition->Emergency Medical Condition (MA) Is the patient pregnant?->No Reason for Exam: right flank pain Acuity: Acute Type of Exam: Initial FINDINGS: Lower Chest: Lung bases clear. Organs: Diffuse hepatic steatosis.   Unremarkable spleen, pancreas, and adrenals on this unenhanced study. A 0.4 cm nonobstructing stone in the right kidney. No additional radiopaque urolithiasis on either side. GI/Bowel: No definite cholelithiasis. Normal appendix. Bowel loops nonobstructed. Pelvis: No definite adnexal mass. No radiopaque stone in the urinary bladder. Peritoneum/Retroperitoneum: No free air or free fluid. No adenopathy. No abdominal aortic aneurysm. Bones/Soft Tissues: Tiny bone island in the right acetabulum. No acute osseous abnormality. A 0.4 cm nonobstructing stone in the right kidney. No additional radiopaque urolithiasis on either side. Diffuse hepatic steatosis. Normal appendix. ED COURSE & MEDICAL DECISION MAKING    Pertinent Labs & Imaging studies reviewed. (See chart for details)  -  Patient seen and evaluated in the emergency department. -  Triage and nursing notes reviewed and incorporated. -  Old chart records reviewed and incorporated. -  Supervising physician was Dr. Shania Noel. Patient was seen independently. -  Differential diagnosis includes: nephrolithiasis, pyelonephritis/uti, renal cell carcinoma, renal abscess, renal infarct, musculoskeletal back pain, pneumonia, others  -  Work-up included: see above  -  Patient declined medications ordered in the ED.  -  Results discussed with patient. She only had 130cc in bladder on bladder scan. Was able to provide a urine specimen and did not require catheter. No evidence of infection. CT abd pelv shows no obstructive uropathy. Labs otherwise reassuring. Will dc home. FU with PCP, return as needed. She is agreeable with plan of care and disposition.  -  Patient dc home. In light of current events, I did utilize appropriate PPE (including N95 and surgical face mask, safety glasses, and gloves, as recommended by the health facility/national standard best practice, during my bedside interactions with the patient. FINAL IMPRESSION    1.  Difficulty urinating Marian Joseph PA-C  09/03/21 5000 venous stasis ulcer    follow up with vascular clinic

## 2021-09-29 NOTE — PHYSICAL EXAM
[Normal Heart Sounds] : normal heart sounds [Respiratory Effort] : normal respiratory effort [2+] : right 2+ [Ankle Swelling On The Left] : of the left ankle [Ankle Swelling (On Exam)] : present [Ankle Swelling Bilaterally] : severe [de-identified] : large ulcer measuring [id number] with serous drainage. [de-identified] : well appearing [Calm] : calm None

## 2021-10-21 NOTE — ED ADULT NURSE NOTE - PRIMARY CARE PROVIDER
[Home] : at home, [unfilled] , at the time of the visit. [Medical Office: ([facility])___] : at the medical office located in  [Verbal consent obtained from patient] : the patient, [unfilled] [Post Hospitalization] : a post hospitalization visit Non affiliated

## 2021-10-21 NOTE — PATIENT PROFILE ADULT - PRO INTERPRETER NEED 2
89 yo white male sent here from [facility] for evaluation of redness to left 3rd digit, timing unknown. Exam revealed an elderly white male with left digits in hand held in tight flexion with redness and loss of superficial layer of skin to left 3rd finger tip that appears to be related to pressure like phenomenon. I agree with plan and management outlined by PA. English

## 2022-03-08 NOTE — ED PROVIDER NOTE - NSFOLLOWUPINSTRUCTIONS_ED_ALL_ED_FT
no
Follow up with your primary care doctor or clinics listed below if you do not have a doctor  Take tylenol for pain  Take the antibiotics for 7 days  Return immediately for any new or worsening symptoms or any new concerns

## 2022-03-12 NOTE — ED ADULT NURSE NOTE - NSFALLRSKASSESSDT_ED_ALL_ED
Pulmonary Progress Note    Chief Complaint: Acute hypoxic respiratory failure and community-acquired pneumonia    Subjective:    No acute events overnight. Patient reports improving her in cough and shortness of breath.     Objective:  • sodium chloride (PF)  2 mL Intracatheter 2 times per day   • enoxaparin  40 mg Subcutaneous Daily   • cefTRIAXone (ROCEPHIN) IV  2,000 mg Intravenous Daily   • guaiFENesin  1,200 mg Oral 2 times per day   • ipratropium-albuterol  3 mL Nebulization 4x Daily Resp   • aspirin  81 mg Oral Daily   • atorvastatin  40 mg Oral Nightly   • calcium carbonate-vitamin D  1 tablet Oral Daily with breakfast   • carvedilol  9.375 mg Oral BID WC   • cholecalciferol  50 mcg Oral Daily   • lisinopril  10 mg Oral BID     • sodium chloride 0.9% infusion 126 mL/hr at 03/12/22 1126       Vital signs:  Reviewed Pertinent: Allergies, Medical History, Surgical History, Social History, Family History and Medications    Vital 24 Hour Range Last Value   Temperature Temp  Min: 97.8 °F (36.6 °C)  Max: 98.7 °F (37.1 °C) 98.1 °F (36.7 °C)   Pulse Pulse  Min: 65  Max: 86 72   Respiratory Resp  Min: 18  Max: 20 20   Blood Pressure BP  Min: 119/58  Max: 167/76 (!) 156/75   Pulse Oximetry SpO2  Min: 89 %  Max: 95 % 90 %     Vital First Value Last Value   Weight Weight: 52.9 kg (116 lb 10 oz) 53.8 kg (118 lb 9.7 oz)   Height N/A 5' 7.99\" (172.7 cm)   Body Mass Index N/A 18.04     Respiratory Therapy Last Value   Incentive Spirometry-Volume     Incentive Spirometry-Number of Breaths 10   Intermittent Positive-Pressure Breathing     Chest Physiotherapy       Intake/Output:  I/O this shift:  In: 1205 [P.O.:260; I.V.:695; IV Piggyback:250]  Out: 650 [Urine:650]  I/O last 3 completed shifts:  In: 5070 [P.O.:1080; I.V.:3740; IV Piggyback:250]  Out: 300 [Urine:300]    Intake/Output Summary (Last 24 hours) at 3/12/2022 1147  Last data filed at 3/12/2022 1126  Gross per 24 hour   Intake 4407.24 ml   Output 950 ml   Net 3457.24  ml       Physical Exam:  General: Not in acute respiratory distress.  HEENT: Pink conjunctivae and anicteric sclera.  Neck:  Supple and no thyroidomegaly  Thorax and Lungs: Left basal crackles.  Cardiovascular: S1 and S2 normal. No gallop or murmur.  Abdomen: Soft, nontender no organomegaly.  Extremities: No extremity edema.  Neurological: Alert and oriented ×3. No focal neurologic deficit.      Labs:  Recent Labs   Lab 03/11/22  0610 03/10/22  0856   WBC 9.8 7.9   HGB 12.1 13.1   HCT 37.7 41.2    225    Recent Labs   Lab 03/11/22  0610 03/10/22  0856   SODIUM 140 138   POTASSIUM 4.0 4.0   CHLORIDE 106 98   CO2 25 29   BUN 33* 28*   CREATININE 0.81 1.01*   CALCIUM 8.2* 9.2   ALBUMIN 2.4* 3.2*   BILIRUBIN 0.4 1.1*   ALKPT 63 74   GPT 10 13   AST 7 6   GLUCOSE 177* 140*      Recent Labs   Lab 03/11/22  0610   MG 2.2     No results found No results found  No results found     Arterial Blood Gas  No results found for: APH, APCO2, APO2, AHCO3, ASAT     Last 24 Hour radiology reports:  No results found.      IMPRESSION  Acute hypoxic respiratory failure  Community-acquired pneumonia  Mediastinal and hilar lymphadenopathies  Patulous esophagus  Former smoker     RECOMMENDATIONS     Improving. Sputum showing moderate GPC. Neg respiratory viral pathogen.     Continue current treatment with antibiotics and supplemental oxygen.     Follow-up sputum Gram stain and culture.  Negative urine Streptococcus and Legionella antigens.     Wean supplemental oxygen as tolerated to keep oxygen saturation of 90% and above.     Once she recover from pneumonia, she will need a follow-up CT scan in 4-6 weeks following discharge.  If the lymphadenopathies persist or get worse, she will need further evaluation with  PET scan and bronchoscopy with biopsy.       Ruben Garcia MD, MPH, FACP  Pulmonary/Critical Care/Sleep   08-Mar-2019 18:38

## 2022-03-14 NOTE — ED PROVIDER NOTE - GASTROINTESTINAL NEGATIVE STATEMENT, MLM
14-Mar-2022 21:49 no abdominal pain, no bloating, no constipation, no diarrhea, no nausea and no vomiting.

## 2022-03-18 NOTE — ED PROVIDER NOTE - NS ED ATTENDING STATEMENT MOD
DATE OF SERVICE:  03/18/2022     PREOPERATIVE DIAGNOSES:  Right shoulder impingement syndrome, possible SLAP   lesion, recurrent rotator cuff tear.     POSTOPERATIVE DIAGNOSES:  Right shoulder impingement syndrome, possible SLAP   lesion, recurrent rotator cuff tear, with humeral head osteochondral defect.     PROCEDURES:  Right shoulder arthroscopy, subacromial decompression, labral   debridement, chondroplasty, revision rotator cuff repair.     SURGEON:  Karey Rapp MD     ASSISTANT:  Saravanan Camargo CFA     ANESTHESIOLOGIST:  Yolanda Pinzon MD     TYPE OF ANESTHESIA:  General, with preoperative interscalene nerve block.     INTRAVENOUS FLUID:  1 liter crystalloid.     ESTIMATED BLOOD LOSS:  Minimal.     DRAINS:  None.     SPECIMENS:  None.     COMPLICATIONS:  None.     IMPLANTS:  [place] alpha vent anchor x1.     REASON FOR PROCEDURE:  The patient is a 35-year-old male who has undergone 2   prior rotator cuff repairs in the past, one in 2009 and a second in 2017.  He   continued to have pain.  We reviewed MRI findings.  He failed nonoperative   measures.  We decided to proceed with arthroscopy.     OPERATION:  The patient was given a right interscalene nerve block by the   anesthesiologist before surgery.  Once back in the operating room, a breathing   tube was placed.  He was given 2 grams of IV Ancef.  He was then placed in   the lateral decubitus position.  Care was taken to pad his axilla as well as   all bony prominences.  The right upper extremity was prepped with ChloraPrep   and draped in standard sterile fashion.  Bony landmarks were drawn and a   standard posterior portal was established.  The arthroscope was then inserted   into the glenohumeral joint.  An anterosuperior cannula was placed in the   rotator interval, just beneath the biceps tendon.  A probe was inserted.  The   glenohumeral joint cartilage surfaces were normal.  There was mild superior   labral fraying.  This was lightly debrided.   The biceps tendon was normal.    There was no instability to the biceps.  The subscapularis tendon was normal.    There was recurrent superior rotator cuff tear.  There was also an anterior   osteochondral defect.  This was smoothed at its edges.  It appeared to be   almost down to bone.  It was simply left alone after a smoothing chondroplasty   was performed at its edges.  It was approximately 2 cm in diameter and at the   far anterior mid portion of the humeral head.  Next, the arthroscope was   inserted into the subacromial space.  A lateral portal was established.  There   was a copious amount of thickened, inflamed bursal tissue.  This was removed   with the 4.0 aggressive shaver.  Borders of the acromion were defined.  A 5.5   mm resector was used to remove the anterior curve as well as lateral edge.    Portals were switched.  Using a standard cutting block technique from   posterior to anterior, a subacromial decompression was completed.  This   created a nice, smooth surface to the undersurface of the acromion.  Attention   was then turned to the rotator cuff below.  It appeared that there had been a   double row repair that had been performed with some tapes that appeared   loose.  The tapes were simply cut from the anchor bodies with the free pieces   removed.  It appeared that the rotator cuff tear simply did not heal.  The   decision was made to perform a single anchor revision repair under minimal   tension.  The adjacent greater tuberosity up to the articular margin was   roughened.  It was then fenestrated with the tip of the punch tap.  The area   where an Iconix anchor all suture had been placed was microfractured and the   anchor body itself removed to allow for improved blood flow from this region.    A single arm alpha vent [place] anchor, triple loaded was placed just off the   articular margin.  The Cobra device was then used to pass a simple suture   anteriorly followed by a wide horizontal  mattress suture in a simple suture   through the middle in a modified Conrad-Dale construct.  All sutures were tied   down from posterior to anterior, allowing for excellent compression and   repair of the rotator cuff.  The lateral aspect of the acromioplasty was   completed.  The arthroscope was then inserted into the glenohumeral joint.    Excellent articular-sided repair had been achieved.  All instruments were then   removed.  Portals were closed with 3-0 nylon suture.  A sterile dressing was   applied.  All drapes were removed and the arm was carefully taken out of   traction and placed into a shoulder abduction sling.  The patient was placed   supine on a stretcher and taken to recovery room, in stable condition.        ______________________________  MD OSWADL COATES/BETY    DD:  03/18/2022 12:04  DT:  03/18/2022 13:15    Job#:  [id number]   Attending Only

## 2022-03-28 NOTE — ED ADULT TRIAGE NOTE - HEIGHT IN INCHES
-- DO NOT REPLY / DO NOT REPLY ALL --        First Attempt:     - Date of discharge to home:  3/27/2022    - Patient discharged from [facility]    - Date of first attempt to contact patient: 3/28/2022    Follow up appointment made:  Yes.  Appointment date:  4/4/2022    
9

## 2022-06-10 NOTE — PATIENT PROFILE ADULT - NSPROMUTANXFEARFT_GEN_A_NUR
Subjective     REASON FOR FOLLOW UP:   1.  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  2.  Maintenance Rituxan every 3 months for 2 years initiated 3/30/2018  3.  Painful pathologic compression fracture at L3 May 2018 due to involvement with diffuse large B-cell non-Hodgkin's lymphoma.   4.  CHOP and Rituxan x 6 cycles completed 9/25/2018.    HISTORY OF PRESENT ILLNESS:  The patient is a 81 y.o. year old male who was seen in consultation on 10/20/2017 and at that time we recommended laparoscopic lymph node sampling and upper and lower GI endoscopy.  He underwent these procedures with Dr. Castañeda on 10/26/2017.  There was no evidence of malignancy from his endoscopic procedures at the laparoscopic specimen returned as follicular center cell B-cell non-Hodgkin's lymphoma.    He received Rituxan treatment initially with 4 weekly doses of Rituxan with good response on follow-up scans.  Unfortunately, a few months after completion of his treatment he developed worsening back pain and has lymphoma involving the L3 vertebral body consistent with diffuse large B-cell non-Hodgkin's lymphoma.    He underwent port placement and received 6 cycles of CHOP and Rituxan which he completed in late September 2018 with what appear to be a complete response on PET scan.    He returns today for 6-month follow-up.  His most recent CT scans were performed on 12/3/2021 showed no evidence of recurrent disease.      Pain  Pertinent negatives include no chest pain, coughing, fatigue, fever, headaches, nausea, neck pain, rash or vomiting.        Past Medical History:   Diagnosis Date   • Arm fracture     right side humerus - sling and swathe   • Cancer (HCC) 2017    Basal Cell on Nose and Under Right Eye   • Coronary artery disease    • DDD (degenerative disc disease), cervical    • Gastric mass    • GERD (gastroesophageal reflux disease)   "  • Hip pain started 5/29/18    onset of acute pain with weight bearing   • Hx of cardiac arrest     Happened after Induction of anesthesia prior  to SURG 10 YRS AGO  \" I FLATLINE BUT W/IN SECONDS HEART WAS OK AFTER TURNED ON BACK...I THINK THEY GAVE ME TO MUCH ANESTHESIA\"   • Hyperlipidemia    • Hypertension    • Kidney stones    • Non Hodgkin's lymphoma (HCC)     SPINE   • Osteoarthritis    • Pleural effusion     DRAINAGE RT PLEURAL FLUID FOR TESTING WITH EGD ON 1-30-17   NO MALIGNANCY WITH CLEARANCE FROM DR SIMMONS FOR SURGERY   • Rheumatoid arthritis (HCC)         Past Surgical History:   Procedure Laterality Date   • COLONOSCOPY N/A 10/26/2017    Procedure: COLONOSCOPY;  Surgeon: Louie Castañeda MD;  Location: [facility] OR;  Service:    • CORONARY ANGIOPLASTY WITH STENT PLACEMENT  1999    17 years ago   • CYSTOSCOPY W/ LITHOLAPAXY / EHL      6-7 years ago   • DIAGNOSTIC LAPAROSCOPY N/A 10/26/2017    Procedure: DIAGNOSTIC LAPAROSCOPY WITH RETROPERITONEAL BIOPSIES;  Surgeon: Louie Castañeda MD;  Location: [facility] OR;  Service:    • ENDOSCOPY      ON 1/30/17 WITH DRAINAGE OF RT PLEURAL FLUID FOR BIOPSY    NO MALIGNANCY  CLEARANCE FOR SURG PER DR SIMMONS   • ENDOSCOPY N/A 10/26/2017    Procedure: ESOPHAGOGASTRODUODENOSCOPY;  Surgeon: Louie Castañeda MD;  Location: [facility] OR;  Service:    • EYE SURGERY Right 2017    Moh's; reconstruction right lower lid   • KIDNEY STONE SURGERY     • PLEURAL BIOPSY     • POSTERIOR CERVICAL FUSION  2001   • SKIN CANCER EXCISION N/A 2017    Basal Cell Nose & under right eye   • TOTAL HIP ARTHROPLASTY Left 04/2010    9-10 years ago    • TOTAL HIP ARTHROPLASTY Right 2/14/2017    Procedure: TOTAL HIP ARTHROPLASTY;  Surgeon: Gerson Cardoza MD;  Location: [facility] OR;  Service:    • VENOUS ACCESS DEVICE (PORT) INSERTION N/A 6/5/2018    Procedure: INSERTION VENOUS ACCESS DEVICE- RIGHT;  Surgeon: Louie Castañeda MD;  Location: [facility] OR;  Service: " "General   • VENOUS ACCESS DEVICE (PORT) REMOVAL Right 08/31/2021    In-office Procedure-Dr. Louie Castañeda, [facility]         ALLERGIES:  No Known Allergies     Review of Systems   Constitutional: Positive for appetite change and unexpected weight change. Negative for activity change, fatigue and fever.   HENT: Negative for hearing loss, nosebleeds, trouble swallowing and voice change.    Eyes: Negative for visual disturbance.   Respiratory: Negative for cough, shortness of breath and wheezing.    Cardiovascular: Negative for chest pain and palpitations.   Gastrointestinal: Positive for constipation. Negative for diarrhea, nausea and vomiting.   Genitourinary: Negative for difficulty urinating, frequency, hematuria and urgency.   Musculoskeletal: Positive for back pain. Negative for neck pain.   Skin: Negative for rash.   Neurological: Negative for dizziness, seizures, syncope and headaches.   Hematological: Negative for adenopathy. Does not bruise/bleed easily.   Psychiatric/Behavioral: Negative for behavioral problems. The patient is not nervous/anxious.         Objective     Vitals:    06/10/22 1325   BP: 143/77   Pulse: 64   Resp: 18   Temp: 97.3 °F (36.3 °C)   TempSrc: Temporal   SpO2: 97%   Weight: 77.8 kg (171 lb 9.6 oz)   Height: 174 cm (68.5\")   PainSc: 0-No pain     Current Status 6/10/2022   ECOG score 0       Physical Exam   Constitutional: He is oriented to person, place, and time. He appears well-developed. No distress.   HENT:   Head: Normocephalic.   Eyes: Pupils are equal, round, and reactive to light. Conjunctivae are normal. No scleral icterus.   Neck: No JVD present. No thyromegaly present.   Cardiovascular: Normal rate and regular rhythm. Exam reveals no gallop and no friction rub.   No murmur heard.  Pulmonary/Chest: Effort normal and breath sounds normal. He has no wheezes. He has no rales.   Abdominal: Soft. He exhibits no distension and no mass. There is no abdominal tenderness. "   Musculoskeletal: Normal range of motion. No deformity.   Lymphadenopathy:     He has no cervical adenopathy.   Neurological: He is alert and oriented to person, place, and time. He has normal reflexes. No cranial nerve deficit.   Skin: Skin is warm and dry. No rash noted. No erythema.   Psychiatric: His behavior is normal. Judgment normal.         RECENT LABS:  Hematology WBC   Date Value Ref Range Status   06/10/2022 5.66 3.40 - 10.80 10*3/mm3 Final   02/20/2020 5.36 3.40 - 10.80 10*3/mm3 Final     RBC   Date Value Ref Range Status   06/10/2022 4.00 (L) 4.14 - 5.80 10*6/mm3 Final   02/20/2020 4.04 (L) 4.14 - 5.80 10*6/mm3 Final     Hemoglobin   Date Value Ref Range Status   06/10/2022 12.4 (L) 13.0 - 17.7 g/dL Final     Hematocrit   Date Value Ref Range Status   06/10/2022 37.8 37.5 - 51.0 % Final     Platelets   Date Value Ref Range Status   06/10/2022 171 140 - 450 10*3/mm3 Final          Lab Results   Component Value Date    GLUCOSE 100 (H) 06/10/2022    BUN 22 06/10/2022    CREATININE 1.33 (H) 06/10/2022    EGFRIFNONA 51 (L) 12/03/2021    EGFRIFAFRI 64 02/20/2020    BCR 16.5 06/10/2022    K 4.8 06/10/2022    CO2 26.9 06/10/2022    CALCIUM 9.0 06/10/2022    PROTENTOTREF 6.5 03/02/2022    ALBUMIN 3.80 06/10/2022    LABIL2 1.7 03/02/2022    AST 15 06/10/2022    ALT 8 06/10/2022       CT CHEST, ABDOMEN AND PELVIS WITHOUT CONTRAST  12/3/2021  IMPRESSION:  1. Stable appearance of the chest, abdomen and pelvis since the previous  study of 01/05/2021.  2. Ill-defined soft tissue in the left paraaortic region at the level of  the superior mesenteric artery is again seen.  3. Repeat CT of the chest, abdomen and pelvis in 1 year is suggested.    Images personally reviewed    Assessment & Plan     1.  Diffuse large B-cell non-Hodgkin's lymphoma involving the L3 vertebral body.  PET scan after 4 cycles of CHOP and Rituxan showed essentially a complete metabolic response.  He completed a total of 6 cycles of chemotherapy  by 9/25/2018 and remains in remission now over 3 years out from completion of his treatment.  2.  Previous treatment with single agent Rituxan for follicular B-cell lymphoma.  3.  Back pain related to lymphoma involving the spine.  Improved  4.  Right IJ Mediport removed in August 2021.        Plan  1.  We will schedule MD follow-up in the office in 6 months.   2.  Patient will undergo labs and surveillance CT scans of the chest abdomen and pelvis 1 week prior to that visit.  3.  David knows to call us if he develops any new concerns or problems prior to the next scheduled follow-up.        none

## 2022-09-01 NOTE — ED ADULT NURSE NOTE - WEIGHT BEARING STATUS MAINTAINED
partial weight bearing left Oral Minoxidil Pregnancy And Lactation Text: This medication should only be used when clearly needed if you are pregnant, attempting to become pregnant or breast feeding.

## 2022-09-13 NOTE — ED ADULT NURSE NOTE - NS ED NOTE ABUSE RESPONSE YN
Patient received all nebulizer treatments as ordered. Patient is on room air with SpO2 in the mid-to-high 90's. Lung sounds are diminished. Respiratory will continue to assess and treat.    Jeb Milner, RRT on 9/13/2022 at 2:07 PM     Yes

## 2022-11-01 NOTE — ED PROVIDER NOTE - CROS ED CONS ALL NEG
Occupational Therapy    Visit Type: treatment and initial evaluation  Precautions:  Medical precautions:  fall risk; standard precautions.    Lines:     Basic: telemetry, O2 and wound vac      Lines in chart and on patient reviewed, precautions maintained throughout session.  Hearing: no hearing deficits  Safety Measures: bed alarm and bed rails  SUBJECTIVE  Patient agreed to participate in therapy this date.  \"I need to be able to transfer to a chair or the bedside commode.  My daughter helps me at home, but she goes to school and works.\"  Prior Living Situation  Information Provided By:: patient  Type of Home: House  Home Layout: One level, Other (comment) (ramp)  Lives With: Daughter  Receives Help From: Family  Transportation: Family  Bathroom Shower/Tub: Walk-in shower  Bathroom Equipment: Tub transfer bench, Commode, Hand-held shower, Grab bars in shower  Bathroom Accessibility: Entry Level  Home Equipment: Wheelchair-manual    Prior Communication/Cognition  Prior Cognition: Intact    Prior Function  Eating: Independent  Grooming: Set-up  Bathing: Set-up  Upper Body Dressing: Set-up (bed level to dress)  Lower Body Dressing: Set-up  Dressing Equipment: Reacher, Sock aid  Toileting: Supervision (Supv)  Bed Mobility: Supervision (Supv)  Transfers: Supervision (Supv)  Toilet Transfers: Supervision (Supv)  Toilet Transfer Equipment: Commode  Tub/Shower Transfers: Minimal Assist (Min)  Locomotion Equipment: Wheelchair  Wheelchair Mobility: Supervision (Supv)  History of Falls in past year: No  Prior Homemaking/IADLs: Needs assistance (daughter completes)  Hearing: No hearing deficits  Vision: Does not wear glasses  Vocational: On disability  Additional Comments: Daughter primary caregiver, she goes to school days and works evenings  During treatment session therapist was wearing mask and patient was not wearing mask  Patient / Family Goal: return to previous functional status    Pain     Location: back, at rest, up  to 10 with rolling in bed    At onset of session (out of 10): 9     OBJECTIVE     Cognitive Status   Orientation    - Oriented to: person, place, time and situation  Functional Communication   - Overall Status: within functional limits     Range of Motion (ROM)   (degrees unless noted; active unless noted; norms in ( ); negative=lacking to 0, positive=beyond 0)  WNL: LUE, RUE    Strength  (out of 5 unless noted, standard test position unless noted)   5/5: LUE, RUE       Coordination  LUE: intact   RUE: intact      Sensation/Dermatome Testing:    Intact: LUE light touch, RUE light touch  Muscle Tone  LUE: WFL  RUE: WFL      Bed Mobility  - Rolling left: minimal assist  - Rolling right: maximal assist      Activities of Daily Living (ADLs)  Eating:   - Assist: modified independent  Grooming/Oral Hygiene:   - Grooming assist: set up       - Oral hygiene assist: set up  - Position: supine/bed  Lower Body Dressing:   - Assist: maximal assist  - Footwear:       - Assistance: minimal assist       - Type: socks  - Assist needed for: don/doff left sock and don/doff right sock  - Equipment: reacher and sock aid  Toileting:   - Assist: total assist - non-dependent     Interventions    Training provided: activity tolerance, ADL training, balance retraining, bed mobility training, compensatory techniques, body mechanics, safety training and transfer training  Skilled input: verbal instruction/cues and tactile instruction/cues  Verbal Consent: Writer verbally educated and received verbal consent for hand placement, positioning of patient, and techniques to be performed today from patient for clothing adjustments for techniques and therapist position for techniques as described above and how they are pertinent to the patient's plan of care.         ASSESSMENT  Impairments: activity tolerance, balance, pain and bed mobility  Functional Limitations: bed mobility, functional mobility, grooming, bathing, toileting, showering,  dressing, participating in meaningful/purposeful activities, functional transfers and IADLs   Patient admitted for wound dehiscence. Patient recently discharged post   surgery for extension of existing L2-S1 fusion to L1 to home with daughter.  PMHx. Includes HTN, DM, obesity.    Patient limited by pain today, Good UE strength and AROM, rolling to right max assist, rolling to left min assist.  Patient able to complete LE dressing min assist with sock aide and reacher.  Unable to sit edge of bed due to pain.  This is a significant change in status for patient who is typically able to transfer bed to chair/BSC modified independent, dress self.  Lives with daughter who is going to school and working. Patient requires assist for bathroom transfers.    AM-PAC Cognition Screen:  Cognition Score: 24/24  Cognition Interpretation: no impairment suspected    Discharge Recommendations:   Recommendations for Discharge: OT IL: Patient is appropriate for daily Occupational Therapy        PT/OT ADL Equipment for Discharge: to be determined, has tub bench in shower, reacher, sock aide, bedside commode, adjustable bed      Therapy Diagnosis:   Decreased ability to perform self care tasks and functional transfers.  Progress: slow progress, decreased activity tolerance and improving as expected    • Skilled therapy is required to address these limitations in attempt to maximize the patient's independence.     • Personal Occupations Profile Affected: bathing/showering, functional mobility/transfers, personal device care, upper body dressing, lower body dressing, toileting/toilet hygiene, personal hygiene/grooming, sleep participation, meal preparation/cleanup, rest/sleep preparation, social participation community, social participation family, social participation peer, social participation friend      • Clinical decision making: Moderate - Patient has several limitations (3-5), comorbidities and/or complexities, as noted in detailed  assessment above, that impact their occupational profile.  Resulting in several treatment options and minimal to moderate task modification consistent with moderate clinical decision making complexity.    Education Provided:   Learning Assessment:  - Primary learner: patient  - Are they ready to learn: yes  - Preferred learning style: verbal and demonstration  - Barriers to learning: no barriers apparent at this time  Education provided during session:  - Receiving Education: patient  - Results of above outlined education: Needs reinforcement    Patient at End of Session:   Location: in bed  Safety measures: alarm system in place/re-engaged, call light within reach, equipment intact and lines intact  Handoff to: nurse    PLAN  Suggestions for next session as indicated: Frequency Comments: 11/1, evaluation, back surgery, SARvs home, p3, 1   A minimum of 8 minutes per session x 1 week.  Interventions: activity tolerance training, ADL retraining, balance, bed mobility training, compensatory technique education, compensatory techniques, safety training, functional transfer training and therapeutic activity  Agreement to plan and goals: patient agrees with goals and treatment plan      GOALS  Long Term Goals: (to be met by time of discharge from hospital)    Patient to improve bed to chair/BSC transfer to SBA  Patient to imrpove OFH to SBA, seated  Patient to complete LE dressing SBA edge of bed.  Patient to improve shower transfer to min assist with transfer bench        Documented in the chart in the following areas: Prior Level of Function. Assessment. Plan.      Therapy procedure time and total treatment time can be found documented on the Time Entry flowsheet   - - -

## 2022-12-01 NOTE — PHYSICAL EXAM
[Respiratory Effort] : normal respiratory effort [Normal Heart Sounds] : normal heart sounds [2+] : left 2+ [Ankle Swelling (On Exam)] : present [Ankle Swelling On The Left] : of the left ankle [Ankle Swelling Bilaterally] : severe [Calm] : calm [de-identified] : well appearing [de-identified] : large ulcer measuring [id number] with serous drainage, no foul smell from wound Spontaneous, unlabored and symmetrical

## 2022-12-20 NOTE — ED PROVIDER NOTE - NEUROLOGICAL, MLM
Currently rate controlled afib on telemetry.   Continue Toprol and Verapamil for HR control.  On coumadin at home for chemical anticoagulation.   Follow up AM INR. Last INR was 1.65.  cont heparin gtt Alert and oriented, no focal deficits, no motor or sensory deficits.

## 2023-01-03 NOTE — ED ADULT NURSE NOTE - NS PRO PASSIVE SMOKE EXP
Spoke with patient and let her know that the provider is out on vacation. Patient stated understanding    Unknown

## 2023-02-08 NOTE — ED PROVIDER NOTE - CARE PLAN
Principal Discharge DX:	SOB (shortness of breath)  Secondary Diagnosis:	Acute nonintractable headache, unspecified headache type Calm

## 2023-03-07 NOTE — ED ADULT NURSE NOTE - NSIMPLEMENTINTERV_GEN_ALL_ED
Implemented All Universal Safety Interventions:  [place] to call system. Call bell, personal items and telephone within reach. Instruct patient to call for assistance. Room bathroom lighting operational. Non-slip footwear when patient is off stretcher. Physically safe environment: no spills, clutter or unnecessary equipment. Stretcher in lowest position, wheels locked, appropriate side rails in place.
Yes

## 2023-03-10 NOTE — ED ADULT NURSE NOTE - PRO INTERPRETER NEED 2
Pt presents to ED with chief complaint of bilateral otalgia. Pt states it has been ongoing x2 days. Reports extensive history of ear infections. States it [place] like I'm underwater. \"
English

## 2023-03-10 NOTE — ED PROVIDER NOTE - NS ED MD DISPO DISCHARGE
Pt complaining of arm bilateral arm and leg pain going on for 1 year now. Pt a+o x's 4, abc's intact, skin warm and dry.      [facility]  03/10/23 0673 Home

## 2023-03-23 NOTE — ED ADULT NURSE NOTE - GENITOURINARY WDL
[General Appearance - Well Developed] : well developed [General Appearance - Well Nourished] : well nourished [Normal Appearance] : normal appearance [Well Groomed] : well groomed [General Appearance - In No Acute Distress] : no acute distress [Edema] : no peripheral edema [Respiration, Rhythm And Depth] : normal respiratory rhythm and effort [Exaggerated Use Of Accessory Muscles For Inspiration] : no accessory muscle use [Abdomen Soft] : soft [Abdomen Tenderness] : non-tender [Costovertebral Angle Tenderness] : no ~M costovertebral angle tenderness [Urethral Meatus] : meatus normal [Urinary Bladder Findings] : the bladder was normal on palpation [Scrotum] : the scrotum was normal [Testes Mass (___cm)] : there were no testicular masses [No Prostate Nodules] : no prostate nodules [Prostate Size ___ gm] : prostate size [unfilled] gm [Normal Station and Gait] : the gait and station were normal for the patient's age [] : no rash [No Focal Deficits] : no focal deficits [Oriented To Time, Place, And Person] : oriented to person, place, and time [Affect] : the affect was normal [Mood] : the mood was normal [Not Anxious] : not anxious [No Palpable Adenopathy] : no palpable adenopathy Bladder non-tender and non-distended. Urine clear yellow.

## 2023-04-20 NOTE — ED ADULT NURSE NOTE - NSIMPLEMENTINTERV_GEN_ALL_ED
81.6
Implemented All Universal Safety Interventions:  [place] to call system. Call bell, personal items and telephone within reach. Instruct patient to call for assistance. Room bathroom lighting operational. Non-slip footwear when patient is off stretcher. Physically safe environment: no spills, clutter or unnecessary equipment. Stretcher in lowest position, wheels locked, appropriate side rails in place.

## 2023-06-12 NOTE — ED PROVIDER NOTE - NSCAREINITIATED _GEN_ER
PT came in asking for a med refil,  had an appointment with you today but rescheduled but Is almost out of meds.   The meds are: lisinopriL-hydrochlorothiazide 10-12.5 mg tablet, metFORMIN (Glucophage) 500 mg tablet, tamsulosin (Flomax) 0.4 mg 24 hr capsule.     Trinh Santos(Attending)

## 2023-06-24 NOTE — DISCHARGE NOTE PROVIDER - NSDCHC_MEDRECSTATUS_GEN_ALL_CORE
Emergency Physician Note  [facility]    Pt Name: Salvador Snyder  MRN: [id number]  [place] 2009  Date of evaluation: 6/24/2023  Provider: Mk Brasher MD  PCP: No primary care provider on file. Note Open Time: 8:19 AM EDT 6/24/23    Chief Complaint  Pharyngitis, Rash, and Shoulder Pain (C/o sore throat x 4 days. C/o rash and shoulder pain started last night )       History of Present Illness  Salvador Snyder is a 10 y.o. male who presents to the ED for pharyngitis. Patient complains of sore throat for last 4 days and perhaps some low-grade temperatures but no measured fevers. No vomiting or shortness of breath. No rhinorrhea or cough. He did develop bilateral muscular pains in his shoulders overnight and yesterday afternoon developed a rash. He has been using a different conditioner for the last 3 days and also drinks some eucalyptus tea yesterday that he has not had before. Tea was several hours before the rash came on. He denies any trouble breathing or trouble swallowing. History from : Patient and Family mother    Limitations to history : None    REVIEW OF SYSTEMS :      Review of Systems    Positives and Pertinent negatives as per HPI. Medications/allergies/medical/social/family history  I have reviewed the following from the nursing documentation:      Prior to Admission medications    Medication Sig Start Date End Date Taking?  Authorizing Provider   cephALEXin (KEFLEX) 500 MG capsule Take 1 capsule by mouth 2 times daily for 10 days 6/24/23 7/4/23 Yes Mk Brasher MD   diphenhydrAMINE (BENADRYL) 25 MG capsule Take 1 capsule by mouth every 6 hours as needed for Itching or Allergies 6/24/23 7/4/23 Yes Mk Brasher MD   ondansetron (ZOFRAN ODT) 4 MG disintegrating tablet Take 1 tablet by mouth every 8 hours as needed for Nausea May Sub regular tablet (non-ODT) if insurance does not cover ODT. 3/3/22   Tomasa Byrd MD   famotidine (PEPCID) 20 MG tablet Admission Reconciliation is Completed  Discharge Reconciliation is Not Complete Admission Reconciliation is Completed  Discharge Reconciliation is Completed

## 2023-12-19 NOTE — ED PROVIDER NOTE - CPE EDP PSYCH NORM
SUBJECTIVE/ROS:  Patient seen and examined at bedside.  Admits to sleeping well.  No acute urinary or GI symptoms  Plan for DC hom3 12/10  Plan for repeat CT chest outpatient, per pulmonary.       ROS--No chest or abd pain, no palpitations nausea or vomiting, LBM 12/18    Therapy  Sustained improvement noted  Ambulating >100FT  with walker, cga but still requiring assistance sit to stand due to hip extensor weakness  Selft ambulatory with manual WC        Vital Signs Last 24 Hrs  T(C): 36.7 (19 Dec 2023 08:22), Max: 36.9 (18 Dec 2023 20:30)  T(F): 98 (19 Dec 2023 08:22), Max: 98.5 (18 Dec 2023 20:30)  HR: 89 (19 Dec 2023 08:22) (88 - 89)  BP: 127/73 (19 Dec 2023 08:22) (127/73 - 139/67)  BP(mean): --  RR: 16 (19 Dec 2023 08:22) (16 - 16)  SpO2: 92% (19 Dec 2023 08:22) (92% - 94%)      PHYSICAL EXAM:  Gen -  Comfortable,  at bedside -normal oxy sat and subsequently self ambulating WC functional distance and with walker for increasing distance, NOW > 100FT  Pulm - clear  Cardiovascular - HS i and II intermittently irregular  Abdomen - Soft, non tender,   Extremities - edema to b/l LE, no calf tenderness, LUE Med line    Neuro-  Cognitive - awake, alert, fully oriented,  Light tough sensation diffusely reduced on fingers and dorsal foot.   Motor -                    LEFT    UE - 4/5                    RIGHT UE - 4/5                     LEFT    LE - 3+/5, distally and 3/5 proximal                    RIGHT LE - Ankles PF 3/5 ,DF 2/5, Knee ext 3/5 Hips limited by pain Rt hip due to ulcer at ECHO access site   Sensory - decreased light tough sensation fingers and sole of foot, difusely  MSK: improvement with motor strength  Psychiatric - Mood stable, Affect WNL  Skin -- , Pressure injury on b/l buttock is healing, no discharge  Wound on right pito, continues to improve, very limited surface area and shallow  MMT--Able to contract B/L upper back muscles, EF/EE 4/5 distally, knee Est 3+/5      RECENT LABS/IMAGING                        10.3   7.46  )-----------( 306      ( 18 Dec 2023 08:40 )             34.2     12-18    141  |  105  |  17  ----------------------------<  121<H>  4.2   |  28  |  0.56    Ca    9.3      18 Dec 2023 08:40    TPro  5.5<L>  /  Alb  2.8<L>  /  TBili  0.6  /  DBili  x   /  AST  21  /  ALT  48<H>  /  AlkPhos  103  12-18    Lactate down trending    Urinalysis Basic - ( 18 Dec 2023 08:40 )    Color: x / Appearance: x / SG: x / pH: x  Gluc: 121 mg/dL / Ketone: x  / Bili: x / Urobili: x   Blood: x / Protein: x / Nitrite: x   Leuk Esterase: x / RBC: x / WBC x   Sq Epi: x / Non Sq Epi: x / Bacteria: x      MEDICATIONS  (STANDING):  apixaban 5 milliGRAM(s) Oral every 12 hours  artificial  tears Solution 1 Drop(s) Both EYES every 12 hours  ascorbic acid 500 milliGRAM(s) Oral daily  atovaquone  Suspension 1500 milliGRAM(s) Oral daily  dextrose 5%. 1000 milliLiter(s) (100 mL/Hr) IV Continuous <Continuous>  dextrose 5%. 1000 milliLiter(s) (50 mL/Hr) IV Continuous <Continuous>  dextrose 50% Injectable 12.5 Gram(s) IV Push once  dextrose 50% Injectable 25 Gram(s) IV Push once  dextrose 50% Injectable 25 Gram(s) IV Push once  folic acid 1 milliGRAM(s) Oral daily  gabapentin 300 milliGRAM(s) Oral three times a day  glucagon  Injectable 1 milliGRAM(s) IntraMuscular once  lactobacillus acidophilus 1 Tablet(s) Oral two times a day with meals  lidocaine   4% Patch 1 Patch Transdermal <User Schedule>  lidocaine   4% Patch 2 Patch Transdermal <User Schedule>  melatonin 6 milliGRAM(s) Oral at bedtime  methylPREDNISolone 28 milliGRAM(s) Oral daily  metoprolol tartrate 12.5 milliGRAM(s) Oral two times a day  multivitamin 1 Tablet(s) Oral daily  nystatin Powder 1 Application(s) Topical two times a day  pantoprazole    Tablet 40 milliGRAM(s) Oral before breakfast  polyethylene glycol 3350 17 Gram(s) Oral <User Schedule>  sodium chloride 0.9% Bolus 1000 milliLiter(s) IV Bolus once  sodium chloride 0.9% lock flush 5 milliLiter(s) IV Push two times a day  sodium chloride 0.9%. 1000 milliLiter(s) (100 mL/Hr) IV Continuous <Continuous>  zinc oxide 40% Paste 1 Application(s) Topical three times a day    MEDICATIONS  (PRN):  acetaminophen     Tablet .. 650 milliGRAM(s) Oral every 6 hours PRN Temp greater or equal to 38C (100.4F), Mild Pain (1 - 3), Moderate Pain (4 - 6)  albuterol/ipratropium for Nebulization 3 milliLiter(s) Nebulizer every 6 hours PRN Shortness of Breath and/or Wheezing  ALPRAZolam 0.25 milliGRAM(s) Oral every 6 hours PRN Anxiety  aluminum hydroxide/magnesium hydroxide/simethicone Suspension 30 milliLiter(s) Oral every 4 hours PRN Dyspepsia  ammonium lactate 12% Lotion 1 Application(s) Topical every 12 hours PRN BL feet dryness  baclofen 5 milliGRAM(s) Oral every 8 hours PRN Musculoskeletal Pain  benzocaine/menthol Lozenge 1 Lozenge Oral two times a day PRN Sore Throat  benzonatate 100 milliGRAM(s) Oral three times a day PRN Cough  bisacodyl 5 milliGRAM(s) Oral daily PRN Constipation  dextrose Oral Gel 15 Gram(s) Oral once PRN Blood Glucose LESS THAN 70 milliGRAM(s)/deciliter  hydrocortisone hemorrhoidal Suppository 1 Suppository(s) Rectal two times a day PRN Hemorrhoids  loperamide 2 milliGRAM(s) Oral three times a day PRN Diarrhea  SIMETHICONE SOFTGELS 250 milliGRAM(s),SIMETHICONE SOFTGELS 250 milliGRAM(s) 250 milliGRAM(s) Oral three times a day PRN for gas  sodium chloride 0.65% Nasal 1 Spray(s) Both Nostrils every 8 hours PRN Nasal Congestion      SUBJECTIVE/ROS:  Patient seen and examined at bedside.  Admits to sleeping well.  No acute urinary or GI symptoms  Plan for DC home 12/10  Plan for repeat CT chest outpatient, per pulmonary.     ROS--No chest or abd pain, no palpitations nausea or vomiting, LBM 12/18    Therapy  Sustained improvement noted  Ambulating >100FT  with walker, cga but still requiring assistance sit to stand due to hip extensor weakness  Selft ambulatory with manual WC    Vital Signs Last 24 Hrs  T(C): 36.7 (19 Dec 2023 08:22), Max: 36.9 (18 Dec 2023 20:30)  T(F): 98 (19 Dec 2023 08:22), Max: 98.5 (18 Dec 2023 20:30)  HR: 89 (19 Dec 2023 08:22) (88 - 89)  BP: 127/73 (19 Dec 2023 08:22) (127/73 - 139/67)  RR: 16 (19 Dec 2023 08:22) (16 - 16)  SpO2: 92% (19 Dec 2023 08:22) (92% - 94%)      PHYSICAL EXAM:  Gen -  Comfortable,  at bedside -normal oxy sat and subsequently self ambulating WC functional distance and with walker for increasing distance, now > 100FT  Pulm - clear  Cardiovascular - HS i and II intermittently irregular  Abdomen - Soft, non tender,   Extremities - edema to b/l LE, no calf tenderness, LUE Med line    Neuro-  Cognitive - awake, alert, fully oriented,  Light tough sensation diffusely reduced on fingers and dorsal foot.   Motor -                    LEFT    UE - 4/5                    RIGHT UE - 4/5                     LEFT    LE - 3+/5, distally and 3/5 proximal                    RIGHT LE - Ankles PF 3/5 ,DF 2/5, Knee ext 3/5 Hips limited by pain Rt hip due to ulcer at ECHO access site   Sensory - decreased light tough sensation fingers and sole of foot, difusely  MSK: improvement with motor strength  Psychiatric - Mood stable, Affect WNL  Skin -- , Pressure injury on b/l buttock is healing, no discharge  Wound on right pito, continues to improve, very limited surface area and shallow  MMT--Able to contract B/L upper back muscles, EF/EE 4/5 distally, knee Est 3+/5      RECENT LABS/IMAGING                        10.3   7.46  )-----------( 306      ( 18 Dec 2023 08:40 )             34.2     12-18    141  |  105  |  17  ----------------------------<  121<H>  4.2   |  28  |  0.56    Ca    9.3      18 Dec 2023 08:40    TPro  5.5<L>  /  Alb  2.8<L>  /  TBili  0.6  /  DBili  x   /  AST  21  /  ALT  48<H>  /  AlkPhos  103  12-18    Lactate down trending    Urinalysis Basic - ( 18 Dec 2023 08:40 )    Color: x / Appearance: x / SG: x / pH: x  Gluc: 121 mg/dL / Ketone: x  / Bili: x / Urobili: x   Blood: x / Protein: x / Nitrite: x   Leuk Esterase: x / RBC: x / WBC x   Sq Epi: x / Non Sq Epi: x / Bacteria: x      MEDICATIONS  (STANDING):  apixaban 5 milliGRAM(s) Oral every 12 hours  artificial  tears Solution 1 Drop(s) Both EYES every 12 hours  ascorbic acid 500 milliGRAM(s) Oral daily  atovaquone  Suspension 1500 milliGRAM(s) Oral daily  dextrose 5%. 1000 milliLiter(s) (100 mL/Hr) IV Continuous <Continuous>  dextrose 5%. 1000 milliLiter(s) (50 mL/Hr) IV Continuous <Continuous>  dextrose 50% Injectable 12.5 Gram(s) IV Push once  dextrose 50% Injectable 25 Gram(s) IV Push once  dextrose 50% Injectable 25 Gram(s) IV Push once  folic acid 1 milliGRAM(s) Oral daily  gabapentin 300 milliGRAM(s) Oral three times a day  glucagon  Injectable 1 milliGRAM(s) IntraMuscular once  lactobacillus acidophilus 1 Tablet(s) Oral two times a day with meals  lidocaine   4% Patch 1 Patch Transdermal <User Schedule>  lidocaine   4% Patch 2 Patch Transdermal <User Schedule>  melatonin 6 milliGRAM(s) Oral at bedtime  methylPREDNISolone 28 milliGRAM(s) Oral daily  metoprolol tartrate 12.5 milliGRAM(s) Oral two times a day  multivitamin 1 Tablet(s) Oral daily  nystatin Powder 1 Application(s) Topical two times a day  pantoprazole    Tablet 40 milliGRAM(s) Oral before breakfast  polyethylene glycol 3350 17 Gram(s) Oral <User Schedule>  sodium chloride 0.9% Bolus 1000 milliLiter(s) IV Bolus once  sodium chloride 0.9% lock flush 5 milliLiter(s) IV Push two times a day  sodium chloride 0.9%. 1000 milliLiter(s) (100 mL/Hr) IV Continuous <Continuous>  zinc oxide 40% Paste 1 Application(s) Topical three times a day    MEDICATIONS  (PRN):  acetaminophen     Tablet .. 650 milliGRAM(s) Oral every 6 hours PRN Temp greater or equal to 38C (100.4F), Mild Pain (1 - 3), Moderate Pain (4 - 6)  albuterol/ipratropium for Nebulization 3 milliLiter(s) Nebulizer every 6 hours PRN Shortness of Breath and/or Wheezing  ALPRAZolam 0.25 milliGRAM(s) Oral every 6 hours PRN Anxiety  aluminum hydroxide/magnesium hydroxide/simethicone Suspension 30 milliLiter(s) Oral every 4 hours PRN Dyspepsia  ammonium lactate 12% Lotion 1 Application(s) Topical every 12 hours PRN BL feet dryness  baclofen 5 milliGRAM(s) Oral every 8 hours PRN Musculoskeletal Pain  benzocaine/menthol Lozenge 1 Lozenge Oral two times a day PRN Sore Throat  benzonatate 100 milliGRAM(s) Oral three times a day PRN Cough  bisacodyl 5 milliGRAM(s) Oral daily PRN Constipation  dextrose Oral Gel 15 Gram(s) Oral once PRN Blood Glucose LESS THAN 70 milliGRAM(s)/deciliter  hydrocortisone hemorrhoidal Suppository 1 Suppository(s) Rectal two times a day PRN Hemorrhoids  loperamide 2 milliGRAM(s) Oral three times a day PRN Diarrhea  SIMETHICONE SOFTGELS 250 milliGRAM(s),SIMETHICONE SOFTGELS 250 milliGRAM(s) 250 milliGRAM(s) Oral three times a day PRN for gas  sodium chloride 0.65% Nasal 1 Spray(s) Both Nostrils every 8 hours PRN Nasal Congestion   normal...

## 2024-01-29 NOTE — DISCHARGE NOTE PROVIDER - NSDCCPTREATMENT_GEN_ALL_CORE_FT
PRINCIPAL PROCEDURE  Procedure: Fistulogram  Findings and Treatment: Left arm open embolectomy and revision AVF anatomosis.
37

## 2024-06-07 NOTE — ED ADULT TRIAGE NOTE - NS ED NURSE BANDS TYPE
Name band; [Normal Appearance] : normal appearance [Well Groomed] : well groomed [General Appearance - In No Acute Distress] : no acute distress [Edema] : no peripheral edema [Respiration, Rhythm And Depth] : normal respiratory rhythm and effort [Exaggerated Use Of Accessory Muscles For Inspiration] : no accessory muscle use [Abdomen Soft] : soft [Abdomen Tenderness] : non-tender [Costovertebral Angle Tenderness] : no ~M costovertebral angle tenderness [Normal Station and Gait] : the gait and station were normal for the patient's age [] : no rash [No Focal Deficits] : no focal deficits [Oriented To Time, Place, And Person] : oriented to person, place, and time [Affect] : the affect was normal [Mood] : the mood was normal [No Palpable Adenopathy] : no palpable adenopathy

## 2024-10-15 NOTE — ED PROVIDER NOTE - NS HIV RISK FACTOR YES
Declined DVT: heparin hx of HTN on nifedepine, hydralazine and entresto  -hold nifedipine for now in s/o leg swelling  -c/w hydralazine and entresto

## 2024-11-25 NOTE — ED ADULT NURSE NOTE - NS ED NURSE DISCH DISPOSITION
Admitted Patient seen in [facility], no changes reported. Consent form reviewed including examination by learner and possible conversion to open procedure. All questions answered, case reviewed with circulating OR team.    Eva Rhoades MD

## 2025-01-08 NOTE — ED PROVIDER NOTE - NSFOLLOWUPINSTRUCTIONS_ED_ALL_ED_FT
Acute Wounds    WHAT YOU NEED TO KNOW:    What is an acute wound? A wound is an injury that causes a break in the skin. An acute wound can happen suddenly, last a short time, and may heal on its own.    What causes an acute wound?     An abrasion is a scrape caused when a rough surface rubs against the skin.      A laceration is a jagged wound caused by a hard blow to the skin.       A puncture wound is usually made by a sharp, round, and pointed object, such as a needle or nail.      A cut is caused by an object with a sharp edge, such as a knife or broken glass. It is called an incision when the cut happens during surgery.    What are the signs and symptoms of an infected wound?     Milky, yellow, green, or brown pus in the wound       Red, tender, or warm area around the wound      Fever    How is an acute wound diagnosed? Your healthcare provider will ask about your injury. He or she will examine the injury and the area around it. He or she will check to see how deep the wound is and look for signs of infection. Your provider may check how well you can move the injured body part. He or she will check to see if you are numb at your injury site or below it. You may have either of the following:    A wound culture is a test of fluid or tissue used to see if your wound is infected. The wound culture will also tell your healthcare providers the cause of your infection.      An x-ray is a picture of your bones and tissues in the wound area. Healthcare providers use the pictures to look for broken bones, injuries, or foreign objects such as glass or metal.    How is an acute wound treated? Treatment will depend on how severe the wound is and where it is located. It may also depend on the length of time you have had the injury. You may need any of the following:     NSAIDs, such as ibuprofen, help decrease swelling, pain, and fever. This medicine is available with or without a doctor's order. NSAIDs can cause stomach bleeding or kidney problems in certain people. If you take blood thinner medicine, always ask if NSAIDs are safe for you. Always read the medicine label and follow directions. Do not give these medicines to children under 6 months of age without direction from your child's healthcare provider.      Acetaminophen decreases pain and fever. It is available without a doctor's order. Ask how much to take and how often to take it. Follow directions. Acetaminophen can cause liver damage if not taken correctly.      Antibiotics may be given to prevent or treat an infection caused by bacteria.      A Td vaccine is a booster shot used to help prevent diphtheria and tetanus. The Td booster may be given to adolescents and adults every 10 years or for certain wounds and injuries.      Wound care may include any of the following:   Cleaning and debridement is done to clean and remove objects, dirt, or dead tissues from the open wound. Your healthcare provider may use soap and water or a different solution to clean your wound. He or she may use a syringe to push the solution into all areas of your wound. The force from the syringe will help push out dirt.       Closure of the wound is done with stitches, staples, skin adhesive, or other treatments. This may be done if the wound is wide or deep. Some wounds may need to be packed with wet gauze for some time before closure. Some wounds may not need closure at all to heal.     Call 911 for the following:     You have sudden trouble breathing or chest pain.        When should I seek immediate care?     You have pus or a foul odor coming from the wound.      Blood soaks through your bandage.    When should I contact my healthcare provider?     You have muscle, joint, or body aches, sweating, or a fever.      You have increased swelling, redness, or bleeding in your wound.      Your skin is itchy, swollen, or you have a rash.      You have questions or concerns about your condition or care.    CARE AGREEMENT:    You have the right to help plan your care. Learn about your health condition and how it may be treated. Discuss treatment options with your healthcare providers to decide what care you want to receive. You always have the right to refuse treatment.        © Copyright [employer] 2019 All illustrations and images included in CareNotes are the copyrighted property of [email or web address] or [email or web address]. [email or web address]

## 2025-04-16 NOTE — ED ADULT TRIAGE NOTE - HEIGHT IN FEET
Physical Therapy Evaluation    Visit Type: Daily Treatment Note- Initial Evaluation  Visit: 1  Referring Provider: FANTASMA Zavaleta  Medical Diagnosis (from order): W19.XXXA - Fall, initial encounter   Treatment Diagnosis: right knee, left knee - increased pain/symptoms, impaired posture, impaired range of motion, impaired joint play/mobility, impaired mobility, impaired body mechanics, impaired coordination, impaired activity tolerance, impaired strength, impaired muscle length/flexibility, impaired motor function/performance/coordination, impaired gait and impaired balance.  Onset  - Date of onset: 1/23/25  Chart reviewed at time of initial evaluation (relevant co-morbidities, allergies, tests and medications listed):   - Diagnostic tests reviewed: X-Ray  diabetes, hypertension and osteopenia  Bilateral total hip replacement   Has a bladder stimulator but off   History of left knee scope > 15 years ago       SUBJECTIVE                                                                                                               Patient fell forward on to her left knee in January. There was snow and ice.   Pain is progressing   She does not drive or work outside the house     Pain / Symptoms  - Pain rating (out of 10): Current: 2 ; Best: 1; Worst: 10  - Location: Left knee anterior global   - Quality / Description: stiff, sharp  - Alleviating Factors: rest     - Sitting   - Progression since onset: worsening    Function:   Limitations / Exacerbation Factors:   - Patient reports pain, difficulty and increased time with function reported below.  - meal/food prep, sleep disturbed, grooming/hygiene/self-care activities, grocery shopping, house/yard work, standing tasks, kneeling, bending/squatting/lifting, squatting/lifting, lifting/carrying, pushing/pulling and standing, low transfer (toilet/couch) and floor transfers, community distances, stairs, walking quickly as required to cross a street/exit a building  rapidly, now needs to use assistive device  Prior Level of Function: declining function, therefore referred to therapy,    Patient Goals: decreased pain, increased strength, increased motion and independence with ADLs/IADLs.    Prior treatment  - no therapies  - Discharged from hospital, home health, or skilled nursing facility in last 30 days: no  Home Environment   - Patient lives with: alone  - Type of home: apartment  - Assistance available: no assist (no family or friend support)  - Denies 2 or more falls or an unexplained fall with injury in the last year.  - Feel safe at home / work / school: yes      OBJECTIVE                                                                                                                     Range of Motion (ROM)   (degrees unless noted; active unless noted; norms in ( ); negative=lacking to 0, positive=beyond 0)  Knee:   - Flexion (150):       Left:  98        Right:  115    - Extension (0-10):       Left:  -1        Right:  0     Strength  (out of 5 unless noted, standard test position unless noted)   Hip:    - Flexion:         Left: 4         Right: 4    - Abduction:         Left: 4-         Right: 4  Knee:    - Flexion:         Left: 4         Right: 4+    - Extension:         Left: 4         Right: 4+         Palpation  Left  - Iliotibial Band: tenderness and hypertonic  Right  - Iliotibial Band: tenderness and hypertonic  Knee: [place]/Tendon/Bone  - Medial Collateral Ligament (proximal): - Left: tenderness  - Medial Collateral Ligament (distal): - Left: tenderness  - Lateral Collateral Ligament (proximal): - Left: tenderness  - Lateral Collateral Ligament (distal): - Left: tenderness  - Quad Tendon: - Left: no tenderness  - Patellar Tendon: - Left: no tenderness  - Fibular Head: - Left: no tenderness  Joint Play   Knee / Proximal Fibular Joint: Left   - Posterior Tibia on Femur: hypomobile and pain  - Posterior Fibular Head: hypomobile  Patella: Left  - Medial:  hypomobile  - Lateral: hypomobile  - Superior: hypomobile  - Inferior: hypomobile  Patella: Right   - Medial: hypomobile  - Lateral: hypomobile  - Superior: hypomobile  - Inferior: hypomobile    Muscle Flexibility  - Hip External Rotators:  Left: moderate limitation  - Hip Internal Rotators:  Left: moderate limitation  - Knee Flexors:  Left: minimal limitation        Ambulation / Gait  - Assistive device: single point cane            Outcome/Assessments  Outcome Measures:   Lower Extremity Functional Scale: LEFS Calculated Total: 32 (0=extreme difficulty; 80=no difficulty) see flowsheet for additional documentation       Treatment     Therapeutic Exercise  Exercises practiced and issued as home exercise program with written handout. Patient able to demonstrate one set of each exercise correctly. Please see below home exercise program section of this note for further detail.       Ice parameters for pain     Skilled input: verbal instruction/cues and as detailed above    Writer verbally educated and received verbal consent for hand placement, positioning of patient, and techniques to be performed today from patient for clothing adjustments for techniques, hand placement and palpation for techniques and therapist position for techniques as described above and how they are pertinent to the patient's plan of care.  Home Exercise Program  *above indicates provided as part of home exercise program  Access Code: [email or web address]  URL: [email or web address]  Date: 04/16/2025  Prepared by: Kaylie Layton    Exercises  - Supine Knee Extension Stretch on Towel Roll  - 2 x daily - 7 x weekly - 1 sets - 1 reps - working up to 20 minutes hold  - Supine Heel Slide  - 2 x daily - 7 x weekly - 2 sets - 12 reps  - Seated Knee Flexion AAROM  - 2 x daily - 7 x weekly - 2 sets - 12 reps  - Seated Long Arc Quad  - 2 x daily - 7 x weekly - 2 sets - 12 reps      ASSESSMENT                                                                                                           66 year old patient has reported functional limitations listed above impacted by signs and symptoms consistent with treatment diagnosis below.  Treatment Diagnosis:   - Involved: right knee, left knee.  - Symptoms/impairments: increased pain/symptoms, impaired posture, impaired range of motion, impaired joint play/mobility, impaired mobility, impaired body mechanics, impaired coordination, impaired activity tolerance, impaired strength, impaired muscle length/flexibility, impaired motor function/performance/coordination, impaired gait and impaired balance.    Patient has a long history of knee pain but has been exacerbated since a fall on ice at the end of January. She states it did bruise. AROM, joint mobility and strength limited.   Pain/symptoms after session (out of 10): 0 and 1 (better)    Prognosis: patient will benefit from skilled therapy  Rehabilitative potential is: good.  Predicted patient presentation: Moderate (evolving) - Patient comorbidities and complexities, as defined above, may have varying impact on steady progress for prescribed plan of care.  Education:   - Present and ready to learn: patient  - Results of above outlined education: Verbalizes understanding, Demonstrates understanding and Needs reinforcement    PLAN                                                                                                                         The following skilled interventions to be implemented to achieve goals listed below:  Gait Training (99648)  Neuromuscular Re-Education (02736)  Therapeutic Exercise (34043)  Electrical Stimulation Unattended (80682 or )  Heat/Cold (02742)  Vasopneumatic Device (25833)  Laser  Dry Needling  Manual Therapy (98604)  Therapeutic Activity (62157)  Electrical Stimulation Attended (74442)  Ultrasound (21073)    Frequency / Duration  2 times per week tapering as patient progresses for 12 weeks for an estimated total of  16 visits    Patient involved in and agreed to plan of care and goals.  Patient has been given attendance policy at time of initial evaluation.    Suggestions for next session as indicated: Progress per plan of care  Bike   Hamstring and piriformis stretch   Patellar and knee mobilizations   Knee flexion AROM   Strengthening as tolerated     Goals  Long Term Goals: to be met by end of plan of care  1. Lower Extremity Functional Scale: Patient will score 50 or higher on The Lower Extremity Functional Scale to indicate a decreased level of difficulty with walking and moving around. (minimal clinically important difference: 9 points change)  2. Patient will report tolerating standing for 60 minutes with patient reported manageable/tolerable difficulty for tasks for independent living .  3. Patient will ascend and descend one flight of stairs (12 steps or more) using reciprocal pattern, independently with rail(s) to complete home management and self care.  4. Patient will be independent with progressed and modified home exercise program.    This note sent for referring provider signature        Therapy procedure time and total treatment time can be found documented on the Time Entry flowsheet     5